# Patient Record
Sex: MALE | Race: OTHER | HISPANIC OR LATINO | URBAN - METROPOLITAN AREA
[De-identification: names, ages, dates, MRNs, and addresses within clinical notes are randomized per-mention and may not be internally consistent; named-entity substitution may affect disease eponyms.]

---

## 2017-11-08 ENCOUNTER — EMERGENCY (EMERGENCY)
Facility: HOSPITAL | Age: 62
LOS: 1 days | Discharge: HOME | End: 2017-11-08
Attending: EMERGENCY MEDICINE
Payer: COMMERCIAL

## 2017-11-08 VITALS
TEMPERATURE: 98 F | DIASTOLIC BLOOD PRESSURE: 74 MMHG | OXYGEN SATURATION: 98 % | RESPIRATION RATE: 18 BRPM | HEART RATE: 77 BPM | SYSTOLIC BLOOD PRESSURE: 145 MMHG

## 2017-11-08 VITALS
HEART RATE: 74 BPM | SYSTOLIC BLOOD PRESSURE: 134 MMHG | HEIGHT: 65 IN | OXYGEN SATURATION: 98 % | RESPIRATION RATE: 18 BRPM | TEMPERATURE: 98 F | DIASTOLIC BLOOD PRESSURE: 74 MMHG | WEIGHT: 214.95 LBS

## 2017-11-08 DIAGNOSIS — I82.432 ACUTE EMBOLISM AND THROMBOSIS OF LEFT POPLITEAL VEIN: ICD-10-CM

## 2017-11-08 DIAGNOSIS — M79.662 PAIN IN LEFT LOWER LEG: ICD-10-CM

## 2017-11-08 DIAGNOSIS — I10 ESSENTIAL (PRIMARY) HYPERTENSION: ICD-10-CM

## 2017-11-08 LAB
ALBUMIN SERPL ELPH-MCNC: 4.1 G/DL — SIGNIFICANT CHANGE UP (ref 3.5–5)
ALP SERPL-CCNC: 78 U/L — SIGNIFICANT CHANGE UP (ref 40–120)
ALT FLD-CCNC: 27 U/L DA — SIGNIFICANT CHANGE UP (ref 10–60)
ANION GAP SERPL CALC-SCNC: 5 MMOL/L — SIGNIFICANT CHANGE UP (ref 5–17)
AST SERPL-CCNC: 14 U/L — SIGNIFICANT CHANGE UP (ref 10–40)
BILIRUB SERPL-MCNC: 0.7 MG/DL — SIGNIFICANT CHANGE UP (ref 0.2–1.2)
BUN SERPL-MCNC: 15 MG/DL — SIGNIFICANT CHANGE UP (ref 7–18)
CALCIUM SERPL-MCNC: 8.8 MG/DL — SIGNIFICANT CHANGE UP (ref 8.4–10.5)
CHLORIDE SERPL-SCNC: 105 MMOL/L — SIGNIFICANT CHANGE UP (ref 96–108)
CO2 SERPL-SCNC: 28 MMOL/L — SIGNIFICANT CHANGE UP (ref 22–31)
CREAT SERPL-MCNC: 0.99 MG/DL — SIGNIFICANT CHANGE UP (ref 0.5–1.3)
GLUCOSE SERPL-MCNC: 99 MG/DL — SIGNIFICANT CHANGE UP (ref 70–99)
HCT VFR BLD CALC: 52.2 % — HIGH (ref 39–50)
HGB BLD-MCNC: 17.4 G/DL — HIGH (ref 13–17)
MCHC RBC-ENTMCNC: 30.1 PG — SIGNIFICANT CHANGE UP (ref 27–34)
MCHC RBC-ENTMCNC: 33.4 GM/DL — SIGNIFICANT CHANGE UP (ref 32–36)
MCV RBC AUTO: 90.1 FL — SIGNIFICANT CHANGE UP (ref 80–100)
PLATELET # BLD AUTO: 169 K/UL — SIGNIFICANT CHANGE UP (ref 150–400)
POTASSIUM SERPL-MCNC: 4.3 MMOL/L — SIGNIFICANT CHANGE UP (ref 3.5–5.3)
POTASSIUM SERPL-SCNC: 4.3 MMOL/L — SIGNIFICANT CHANGE UP (ref 3.5–5.3)
PROT SERPL-MCNC: 7.7 G/DL — SIGNIFICANT CHANGE UP (ref 6–8.3)
RBC # BLD: 5.79 M/UL — SIGNIFICANT CHANGE UP (ref 4.2–5.8)
RBC # FLD: 12.3 % — SIGNIFICANT CHANGE UP (ref 10.3–14.5)
SODIUM SERPL-SCNC: 138 MMOL/L — SIGNIFICANT CHANGE UP (ref 135–145)
WBC # BLD: 8.7 K/UL — SIGNIFICANT CHANGE UP (ref 3.8–10.5)
WBC # FLD AUTO: 8.7 K/UL — SIGNIFICANT CHANGE UP (ref 3.8–10.5)

## 2017-11-08 PROCEDURE — 85027 COMPLETE CBC AUTOMATED: CPT

## 2017-11-08 PROCEDURE — 99284 EMERGENCY DEPT VISIT MOD MDM: CPT

## 2017-11-08 PROCEDURE — 99284 EMERGENCY DEPT VISIT MOD MDM: CPT | Mod: 25

## 2017-11-08 PROCEDURE — 93971 EXTREMITY STUDY: CPT | Mod: 26,LT

## 2017-11-08 PROCEDURE — 93971 EXTREMITY STUDY: CPT

## 2017-11-08 PROCEDURE — 96374 THER/PROPH/DIAG INJ IV PUSH: CPT

## 2017-11-08 PROCEDURE — 80053 COMPREHEN METABOLIC PANEL: CPT

## 2017-11-08 RX ORDER — APIXABAN 2.5 MG/1
10 TABLET, FILM COATED ORAL ONCE
Refills: 0 | Status: DISCONTINUED | OUTPATIENT
Start: 2017-11-08 | End: 2017-11-12

## 2017-11-08 RX ORDER — IBUPROFEN 200 MG
1 TABLET ORAL
Qty: 28 | Refills: 0
Start: 2017-11-08 | End: 2017-11-15

## 2017-11-08 RX ORDER — IBUPROFEN 200 MG
600 TABLET ORAL ONCE
Refills: 0 | Status: COMPLETED | OUTPATIENT
Start: 2017-11-08 | End: 2017-11-08

## 2017-11-08 RX ORDER — KETOROLAC TROMETHAMINE 30 MG/ML
30 SYRINGE (ML) INJECTION ONCE
Refills: 0 | Status: DISCONTINUED | OUTPATIENT
Start: 2017-11-08 | End: 2017-11-08

## 2017-11-08 RX ORDER — APIXABAN 2.5 MG/1
1 TABLET, FILM COATED ORAL
Qty: 74 | Refills: 0
Start: 2017-11-08 | End: 2017-12-08

## 2017-11-08 RX ADMIN — Medication 30 MILLIGRAM(S): at 12:40

## 2017-11-08 RX ADMIN — Medication 600 MILLIGRAM(S): at 13:24

## 2017-11-08 RX ADMIN — Medication 600 MILLIGRAM(S): at 12:40

## 2017-11-08 RX ADMIN — Medication 30 MILLIGRAM(S): at 13:24

## 2017-11-08 NOTE — ED PROVIDER NOTE - OBJECTIVE STATEMENT
63 y/o M pt w/ PMHx of HTN presents to ED c/o L calf pain x2 days. Pt reports travel to [place] in September. Pt reports no family history of DVT. Pt denies fever, chills, or any other complaints. PMD is Dr. Israel Redman. Pt's pharmacy is [facility] ([address] [id number]) on [facility]. MADINADA. 63 y/o M pt w/ PMHx of HTN presents to ED c/o L calf pain x2 days. Pt reports travel to [facility] in September. Pt reports no family history of DVT. Pt denies fever, chills, or any other complaints. PMD is Dr. Israel Mcgrath. Pt's pharmacy is [facility] ([address] [id number]) on [address] [facility].

## 2017-11-08 NOTE — ED PROVIDER NOTE - MEDICAL DECISION MAKING DETAILS
started on eliquis. pain improved.   Discussed anticipatory guidance and return precautions. Discussed results and gave copy to pt.  Jalil with outpt follow up.

## 2017-11-08 NOTE — ED ADULT NURSE NOTE - OBJECTIVE STATEMENT
pt is here for leg pain.  c/o pain 10/10.  pt states I have" left calf pain x 2 days".  Denied chest pain or sob, pt calm at the bedside with family member.

## 2017-11-10 ENCOUNTER — EMERGENCY (EMERGENCY)
Facility: HOSPITAL | Age: 62
LOS: 1 days | Discharge: HOME | End: 2017-11-10
Attending: EMERGENCY MEDICINE | Admitting: EMERGENCY MEDICINE
Payer: COMMERCIAL

## 2017-11-10 VITALS
RESPIRATION RATE: 17 BRPM | OXYGEN SATURATION: 97 % | DIASTOLIC BLOOD PRESSURE: 83 MMHG | HEART RATE: 75 BPM | SYSTOLIC BLOOD PRESSURE: 122 MMHG

## 2017-11-10 VITALS
WEIGHT: 214.95 LBS | HEART RATE: 81 BPM | TEMPERATURE: 98 F | RESPIRATION RATE: 18 BRPM | DIASTOLIC BLOOD PRESSURE: 74 MMHG | SYSTOLIC BLOOD PRESSURE: 137 MMHG | OXYGEN SATURATION: 96 %

## 2017-11-10 PROCEDURE — 99283 EMERGENCY DEPT VISIT LOW MDM: CPT

## 2017-11-10 RX ORDER — OXYCODONE HYDROCHLORIDE 5 MG/1
1 TABLET ORAL
Qty: 25 | Refills: 0 | OUTPATIENT
Start: 2017-11-10 | End: 2017-11-15

## 2017-11-10 RX ORDER — OXYCODONE HYDROCHLORIDE 5 MG/1
5 TABLET ORAL ONCE
Qty: 0 | Refills: 0 | Status: DISCONTINUED | OUTPATIENT
Start: 2017-11-10 | End: 2017-11-10

## 2017-11-10 RX ADMIN — OXYCODONE HYDROCHLORIDE 5 MILLIGRAM(S): 5 TABLET ORAL at 10:24

## 2017-11-10 NOTE — ED ADULT NURSE NOTE - OBJECTIVE STATEMENT
Recvd pt awake, alert and responsive to all stimuli.  no sob or respiratory distress noted at this time.  pt c/o significant LLE pain after being dx with left leg dvt.  as per pt, he took a drive to [place] and began to have pain to his LLE on monday.  pt went and was assessed by md on tues being dx with dvt.  pt given eliquis and ibuprofen but still is uncomfortable.  pt denies any cp or sob at this time.  pt currently being assessed by md.  will continue to monitor. Recvd pt awake, alert and responsive to all stimuli.  no sob or respiratory distress noted at this time.  pt c/o significant LLE pain after being dx with left leg dvt.  as per pt, he took a drive to [place] and began to have pain to his LLE on monday.  pt went and was assessed by md on tues being dx with dvt.  pt given eliquis and ibuprofen but still is uncomfortable.  pt denies any cp or sob at this time.  affected extremity is warm to touch and normal in color.  pulses are full and regular in rhythm upon palpation.  capillary refill to affected toes are < 2 seconds.  pt currently being assessed by md.  will continue to monitor.

## 2017-11-10 NOTE — ED PROVIDER NOTE - PLAN OF CARE
You were seen and evaluated in the emergency department for calf pain. It is consistent with pain from your DVT. The blood thinners do not resolve pain immediately and it will take time. In the mean time take You may take up to 400mg of ibuprofen (Motrin, Advil) every 8 hours as needed for pain. Please take ibuprofen with food if possible to prevent stomach upset. You may also take up to 1000mg of acetaminophen (Tylenol) every 8 hours as needed for pain. It is ok to mix these medications with each other. Do not mix them with other pain medications such as naproxen (Alieve) or asprin unless specifically instructed by your doctor. Elevate your leg above the level of your hip when you are not using it. You may apply ice to the affected area for no more than 15 minutes as needed for comfort. You may apply ice every 2-4 times a day as needed.     If these do not relieve the pain we will provide a short course of oxycodone, which is a more potent narcotic than your tramadol. Do not drive, operate heavy machinery, make important decisions, or perform complicated tasks within 4 hours of taking oxycodone. Do not take tramadol within 4 hours of taking oxycodone.     Please return if you develop significant worsening of your leg, chest pain, significant cough, trouble breathing, worsening shortness of breath with activity, you fall with a head injury, have a car accident, have dark black stool or bloody stool, or develop other worsening or concerning new symptoms. Rachele follow up with your regular doctor within the next 2-3 days. You were seen and evaluated in the emergency department for calf pain. It is consistent with pain from your DVT. The blood thinners do not resolve pain immediately and it will take time. In the mean time take You may take up to 220mg of naproxen (Alieve) every 2 times daily as needed for pain. Please take ibuprofen with food if possible to prevent stomach upset. You may also take up to 1000mg of acetaminophen (Tylenol) every 8 hours as needed for pain. It is ok to mix these medications with each other. Do not mix them with other pain medications such as naproxen (Alieve) or asprin unless specifically instructed by your doctor. Elevate your leg above the level of your hip when you are not using it. You may apply ice to the affected area for no more than 15 minutes as needed for comfort. You may apply ice every 2-4 times a day as needed.     If these do not relieve the pain we will provide a short course of oxycodone, which is a more potent narcotic than your tramadol. Do not drive, operate heavy machinery, make important decisions, or perform complicated tasks within 4 hours of taking oxycodone. Do not take tramadol within 4 hours of taking oxycodone.     Please return if you develop significant worsening of your leg, chest pain, significant cough, trouble breathing, worsening shortness of breath with activity, you fall with a head injury, have a car accident, have dark black stool or bloody stool, or develop other worsening or concerning new symptoms. Pleas follow up with your regular doctor within the next 2-3 days.   Seek immediate medical care for any new/worsening signs or symptoms.

## 2017-11-10 NOTE — ED PROVIDER NOTE - PHYSICAL EXAMINATION
ATTENDING MD Tammy:   GEN: well-appearing, mild distress from pain, AOx4  HEAD: NCAT  EYES: clear  ENT: mucus membranes are moist, oropharynx is within normal limits, no JVD  CV: normal rate, regular rhythm, S1, S2, no murmurs, rubs, or gallops  RESP: lungs clear to auscultation bilaterally, equal breath sounds bilaterally, normal rate and effort, no hypoxia  ABD: obese, soft, nontender  MSK: minimal edema of LLE, L-calf tenderness and TTP in popliteal fossa, no thigh tenderness, 2+ DP and PT bilaterally, <2 sec cap refill, no erythema  NEURO: gross motor, sensory and coordination intact

## 2017-11-10 NOTE — ED PROVIDER NOTE - CARE PLAN
Principal Discharge DX:	Acute deep vein thrombosis (DVT) of left lower extremity, unspecified vein  Instructions for follow-up, activity and diet:	You were seen and evaluated in the emergency department for calf pain. It is consistent with pain from your DVT. The blood thinners do not resolve pain immediately and it will take time. In the mean time take You may take up to 400mg of ibuprofen (Motrin, Advil) every 8 hours as needed for pain. Please take ibuprofen with food if possible to prevent stomach upset. You may also take up to 1000mg of acetaminophen (Tylenol) every 8 hours as needed for pain. It is ok to mix these medications with each other. Do not mix them with other pain medications such as naproxen (Alieve) or asprin unless specifically instructed by your doctor. Elevate your leg above the level of your hip when you are not using it. You may apply ice to the affected area for no more than 15 minutes as needed for comfort. You may apply ice every 2-4 times a day as needed.     If these do not relieve the pain we will provide a short course of oxycodone, which is a more potent narcotic than your tramadol. Do not drive, operate heavy machinery, make important decisions, or perform complicated tasks within 4 hours of taking oxycodone. Do not take tramadol within 4 hours of taking oxycodone.     Please return if you develop significant worsening of your leg, chest pain, significant cough, trouble breathing, worsening shortness of breath with activity, you fall with a head injury, have a car accident, have dark black stool or bloody stool, or develop other worsening or concerning new symptoms. Rachlee follow up with your regular doctor within the next 2-3 days. Principal Discharge DX:	Acute deep vein thrombosis (DVT) of left lower extremity, unspecified vein  Instructions for follow-up, activity and diet:	You were seen and evaluated in the emergency department for calf pain. It is consistent with pain from your DVT. The blood thinners do not resolve pain immediately and it will take time. In the mean time take You may take up to 220mg of naproxen (Alieve) every 2 times daily as needed for pain. Please take ibuprofen with food if possible to prevent stomach upset. You may also take up to 1000mg of acetaminophen (Tylenol) every 8 hours as needed for pain. It is ok to mix these medications with each other. Do not mix them with other pain medications such as naproxen (Alieve) or asprin unless specifically instructed by your doctor. Elevate your leg above the level of your hip when you are not using it. You may apply ice to the affected area for no more than 15 minutes as needed for comfort. You may apply ice every 2-4 times a day as needed.     If these do not relieve the pain we will provide a short course of oxycodone, which is a more potent narcotic than your tramadol. Do not drive, operate heavy machinery, make important decisions, or perform complicated tasks within 4 hours of taking oxycodone. Do not take tramadol within 4 hours of taking oxycodone.     Please return if you develop significant worsening of your leg, chest pain, significant cough, trouble breathing, worsening shortness of breath with activity, you fall with a head injury, have a car accident, have dark black stool or bloody stool, or develop other worsening or concerning new symptoms. Rachele follow up with your regular doctor within the next 2-3 days.   Seek immediate medical care for any new/worsening signs or symptoms.

## 2017-11-10 NOTE — ED ADULT NURSE NOTE - PMH
Bilateral otitis media    Borderline hypertension    DVT (deep venous thrombosis)    HLD (hyperlipidemia)    Migraine headache    Obstructive sleep apnea on C PAP

## 2017-11-10 NOTE — ED PROVIDER NOTE - OBJECTIVE STATEMENT
This is a 62 year old male w/hx of sciatica HTN, and HLD complaining of R-calf pain since Monday. Pt just came back from a long road trip to [place]. Seen in his PCP's office on Tuesday, got a DVT US that was positive for DVT (reportedly PT and peroneal vein but no report present) and was started on elliquis which he has been taking as directed . He was taking motrin and tramadol (which he takes for his chronic sciatic pain). He says his symptoms have not worsened but the pain medication he is taking is not sufficient to function as it hurts too much to walk around. He denies chest pain, cough, hemotpysis, palpitations, syncope or presyncope, SOB at rest or with position. He has a chronic DE LEON x1 year non-worsening for which he has seen a cardiologist and had an exercise stress test and other workup that was unremarkable. These symptoms have not recently worsened.  No cancer Hx.    Pt declining interperter requesting his daughter interpret at bedside. They do not feel any concerns with communication and neither do providers

## 2017-11-10 NOTE — ED PROVIDER NOTE - GASTROINTESTINAL NEGATIVE STATEMENT, MLM
no abdominal pain, no bloating, no constipation, no diarrhea, no nausea and no vomiting. No black or bloody stool.

## 2017-11-10 NOTE — ED PROVIDER NOTE - ATTENDING CONTRIBUTION TO CARE
ATTENDING MD: I, Jj Munoz, have seen and evaluated this patient with the advance practice clinician.  I have discussed the history, exam ,and aspects of care with the APC.  All documentation by attending

## 2017-11-10 NOTE — ED PROVIDER NOTE - MEDICAL DECISION MAKING DETAILS
ATTENDING MD Tammy: Pt with calf pain non-worsening with known DVT on anticoagulation. Pain is unchanging but not improving with home rx. advised on usual course of DVT, as well as concerning signs or symptoms for which to return to the ED. No signs of acute progression or signs or symptoms of PE developing. Will adjust pain control and DC w/close PCP f/u.

## 2017-11-18 ENCOUNTER — EMERGENCY (EMERGENCY)
Facility: HOSPITAL | Age: 62
LOS: 1 days | Discharge: HOME | End: 2017-11-18
Attending: EMERGENCY MEDICINE | Admitting: EMERGENCY MEDICINE
Payer: COMMERCIAL

## 2017-11-18 VITALS
SYSTOLIC BLOOD PRESSURE: 127 MMHG | OXYGEN SATURATION: 100 % | RESPIRATION RATE: 20 BRPM | HEART RATE: 60 BPM | DIASTOLIC BLOOD PRESSURE: 74 MMHG

## 2017-11-18 VITALS
RESPIRATION RATE: 18 BRPM | OXYGEN SATURATION: 93 % | HEART RATE: 76 BPM | SYSTOLIC BLOOD PRESSURE: 122 MMHG | HEIGHT: 65 IN | TEMPERATURE: 98 F | WEIGHT: 214.95 LBS | DIASTOLIC BLOOD PRESSURE: 78 MMHG

## 2017-11-18 LAB
ALBUMIN SERPL ELPH-MCNC: 4.4 G/DL — SIGNIFICANT CHANGE UP (ref 3.3–5)
ALP SERPL-CCNC: 73 U/L — SIGNIFICANT CHANGE UP (ref 40–120)
ALT FLD-CCNC: 25 U/L RC — SIGNIFICANT CHANGE UP (ref 10–45)
ANION GAP SERPL CALC-SCNC: 12 MMOL/L — SIGNIFICANT CHANGE UP (ref 5–17)
APTT BLD: 36.5 SEC — SIGNIFICANT CHANGE UP (ref 27.5–37.4)
AST SERPL-CCNC: 16 U/L — SIGNIFICANT CHANGE UP (ref 10–40)
BASOPHILS # BLD AUTO: 0.1 K/UL — SIGNIFICANT CHANGE UP (ref 0–0.2)
BASOPHILS NFR BLD AUTO: 1.1 % — SIGNIFICANT CHANGE UP (ref 0–2)
BILIRUB SERPL-MCNC: 0.4 MG/DL — SIGNIFICANT CHANGE UP (ref 0.2–1.2)
BUN SERPL-MCNC: 14 MG/DL — SIGNIFICANT CHANGE UP (ref 7–23)
CALCIUM SERPL-MCNC: 9.3 MG/DL — SIGNIFICANT CHANGE UP (ref 8.4–10.5)
CHLORIDE SERPL-SCNC: 103 MMOL/L — SIGNIFICANT CHANGE UP (ref 96–108)
CO2 SERPL-SCNC: 23 MMOL/L — SIGNIFICANT CHANGE UP (ref 22–31)
CREAT SERPL-MCNC: 0.93 MG/DL — SIGNIFICANT CHANGE UP (ref 0.5–1.3)
EOSINOPHIL # BLD AUTO: 0.1 K/UL — SIGNIFICANT CHANGE UP (ref 0–0.5)
EOSINOPHIL NFR BLD AUTO: 1.5 % — SIGNIFICANT CHANGE UP (ref 0–6)
GLUCOSE SERPL-MCNC: 92 MG/DL — SIGNIFICANT CHANGE UP (ref 70–99)
HCT VFR BLD CALC: 49.6 % — SIGNIFICANT CHANGE UP (ref 39–50)
HGB BLD-MCNC: 16.2 G/DL — SIGNIFICANT CHANGE UP (ref 13–17)
INR BLD: 1.17 RATIO — HIGH (ref 0.88–1.16)
LYMPHOCYTES # BLD AUTO: 1.7 K/UL — SIGNIFICANT CHANGE UP (ref 1–3.3)
LYMPHOCYTES # BLD AUTO: 28.6 % — SIGNIFICANT CHANGE UP (ref 13–44)
MCHC RBC-ENTMCNC: 29.9 PG — SIGNIFICANT CHANGE UP (ref 27–34)
MCHC RBC-ENTMCNC: 32.8 GM/DL — SIGNIFICANT CHANGE UP (ref 32–36)
MCV RBC AUTO: 91.4 FL — SIGNIFICANT CHANGE UP (ref 80–100)
MONOCYTES # BLD AUTO: 0.4 K/UL — SIGNIFICANT CHANGE UP (ref 0–0.9)
MONOCYTES NFR BLD AUTO: 6.3 % — SIGNIFICANT CHANGE UP (ref 2–14)
NEUTROPHILS # BLD AUTO: 3.7 K/UL — SIGNIFICANT CHANGE UP (ref 1.8–7.4)
NEUTROPHILS NFR BLD AUTO: 62.5 % — SIGNIFICANT CHANGE UP (ref 43–77)
PLATELET # BLD AUTO: 171 K/UL — SIGNIFICANT CHANGE UP (ref 150–400)
POTASSIUM SERPL-MCNC: 3.9 MMOL/L — SIGNIFICANT CHANGE UP (ref 3.5–5.3)
POTASSIUM SERPL-SCNC: 3.9 MMOL/L — SIGNIFICANT CHANGE UP (ref 3.5–5.3)
PROT SERPL-MCNC: 7.1 G/DL — SIGNIFICANT CHANGE UP (ref 6–8.3)
PROTHROM AB SERPL-ACNC: 12.8 SEC — HIGH (ref 9.8–12.7)
RBC # BLD: 5.43 M/UL — SIGNIFICANT CHANGE UP (ref 4.2–5.8)
RBC # FLD: 12.8 % — SIGNIFICANT CHANGE UP (ref 10.3–14.5)
SODIUM SERPL-SCNC: 138 MMOL/L — SIGNIFICANT CHANGE UP (ref 135–145)
WBC # BLD: 6 K/UL — SIGNIFICANT CHANGE UP (ref 3.8–10.5)
WBC # FLD AUTO: 6 K/UL — SIGNIFICANT CHANGE UP (ref 3.8–10.5)

## 2017-11-18 PROCEDURE — 74177 CT ABD & PELVIS W/CONTRAST: CPT

## 2017-11-18 PROCEDURE — 72125 CT NECK SPINE W/O DYE: CPT | Mod: 26

## 2017-11-18 PROCEDURE — 73030 X-RAY EXAM OF SHOULDER: CPT

## 2017-11-18 PROCEDURE — 73070 X-RAY EXAM OF ELBOW: CPT

## 2017-11-18 PROCEDURE — 71260 CT THORAX DX C+: CPT

## 2017-11-18 PROCEDURE — 96374 THER/PROPH/DIAG INJ IV PUSH: CPT | Mod: XU

## 2017-11-18 PROCEDURE — 71260 CT THORAX DX C+: CPT | Mod: 26

## 2017-11-18 PROCEDURE — 72125 CT NECK SPINE W/O DYE: CPT

## 2017-11-18 PROCEDURE — 80053 COMPREHEN METABOLIC PANEL: CPT

## 2017-11-18 PROCEDURE — 99285 EMERGENCY DEPT VISIT HI MDM: CPT

## 2017-11-18 PROCEDURE — 70450 CT HEAD/BRAIN W/O DYE: CPT

## 2017-11-18 PROCEDURE — 85610 PROTHROMBIN TIME: CPT

## 2017-11-18 PROCEDURE — 74177 CT ABD & PELVIS W/CONTRAST: CPT | Mod: 26

## 2017-11-18 PROCEDURE — 85730 THROMBOPLASTIN TIME PARTIAL: CPT

## 2017-11-18 PROCEDURE — 99284 EMERGENCY DEPT VISIT MOD MDM: CPT | Mod: 25

## 2017-11-18 PROCEDURE — 70450 CT HEAD/BRAIN W/O DYE: CPT | Mod: 26

## 2017-11-18 PROCEDURE — 73030 X-RAY EXAM OF SHOULDER: CPT | Mod: 26,LT

## 2017-11-18 PROCEDURE — 85027 COMPLETE CBC AUTOMATED: CPT

## 2017-11-18 PROCEDURE — 73070 X-RAY EXAM OF ELBOW: CPT | Mod: 26,LT

## 2017-11-18 RX ORDER — OXYCODONE HYDROCHLORIDE 5 MG/1
5 TABLET ORAL ONCE
Qty: 0 | Refills: 0 | Status: DISCONTINUED | OUTPATIENT
Start: 2017-11-18 | End: 2017-11-18

## 2017-11-18 RX ORDER — ACETAMINOPHEN 500 MG
1000 TABLET ORAL ONCE
Qty: 0 | Refills: 0 | Status: COMPLETED | OUTPATIENT
Start: 2017-11-18 | End: 2017-11-18

## 2017-11-18 RX ADMIN — Medication 400 MILLIGRAM(S): at 15:31

## 2017-11-18 RX ADMIN — OXYCODONE HYDROCHLORIDE 5 MILLIGRAM(S): 5 TABLET ORAL at 20:25

## 2017-11-18 NOTE — ED PROVIDER NOTE - PHYSICAL EXAMINATION
Kennedy: A & O x 3, in pain, HEENT WNL and no facial asymmetry; lungs CTAB, heart with reg rhythm; abdomen soft NTND; extremities with no edema; left shoulder with pain on palpation, pain on elbow and wrist radiating to left shoulder, ranges wrist well and ranges elbow well. normal palpation of spine, hips, legs, and right arm. no abrasions or ecchymosis, headwith no pain, no angelito midline cervical pain, skin with no rashes, neuro exam non focal with no motor or sensory deficits

## 2017-11-18 NOTE — ED PROVIDER NOTE - PLAN OF CARE
Take ibuprofen 600 mg every 8 hours as needed for pain with food and plenty of water for 5 days maximum  Take home rx oxycodone 5 mg as needed for pain not controlled with ibuprofen  Wear sling while awake for comfort.  Rotate shoulder and flex/extend elbow 10 times/hour to avoid stiffness/frozen shoulder  See orthopedics within 1 week  Return to the emergency department for worsening pain, inability to walk, severe swelling, numbness/weakness, confusion/severe headache, or if you have any new or changing symptoms or concerns

## 2017-11-18 NOTE — ED ADULT NURSE NOTE - OBJECTIVE STATEMENT
62 yr old male with dtr and grand dtr presents s/p fall off ladder, was descending on ladder, missed the last 2 steps, fell onto L side, c/o L shoulder, arm pain, L sided lower back pain, +ambulatory since incident, unknown LOC, unwitnessed fall, +on eliquis for LLE DVT, at present neuro exam wnl, limited AROM LUE due to pain, appears comfortable

## 2017-11-18 NOTE — ED PROVIDER NOTE - CARE PLAN
Instructions for follow-up, activity and diet:	Take ibuprofen 600 mg every 8 hours as needed for pain with food and plenty of water for 5 days maximum  Take home rx oxycodone 5 mg as needed for pain not controlled with ibuprofen  Wear sling while awake for comfort.  Rotate shoulder and flex/extend elbow 10 times/hour to avoid stiffness/frozen shoulder  See orthopedics within 1 week  Return to the emergency department for worsening pain, inability to walk, severe swelling, numbness/weakness, confusion/severe headache, or if you have any new or changing symptoms or concerns Principal Discharge DX:	Fall from ladder, initial encounter  Instructions for follow-up, activity and diet:	Take ibuprofen 600 mg every 8 hours as needed for pain with food and plenty of water for 5 days maximum  Take home rx oxycodone 5 mg as needed for pain not controlled with ibuprofen  Wear sling while awake for comfort.  Rotate shoulder and flex/extend elbow 10 times/hour to avoid stiffness/frozen shoulder  See orthopedics within 1 week  Return to the emergency department for worsening pain, inability to walk, severe swelling, numbness/weakness, confusion/severe headache, or if you have any new or changing symptoms or concerns  Secondary Diagnosis:	Arm contusion, left, initial encounter

## 2017-11-18 NOTE — ED PROVIDER NOTE - PROGRESS NOTE DETAILS
Attending MD Leger: bui CT scan negative for acute traumatic injuries. plain films L shoulder and humerus prelim read negative for fracture or dislocation. Patient still with left arm pain, ddx includes AC sprain/strain vs. msk arm pain. Will place in arm sling, dc home with PO analgesia and ortho follow up Kennedy PGY3: patient will take his oxycodone at home and does not need prescription. will have work note and follow up with ortho

## 2017-11-18 NOTE — ED PROVIDER NOTE - ATTENDING CONTRIBUTION TO CARE
86 yo M presents s/p fall from ladder. He is on eloquis. He fell from a height of 3ft he states. He fell onto his left side. He mainly complains of L arm pain, 10/10 with difficulty moving the L shoulder. He reports mild headache. He did hit his head. no LOC.   PE with L shoulder and L elbow TTP. No L wrist TTP. + L chest and L abdominal/flank TTP. neuro intact. no obvious deformity, no echymosis/swelling/crepitus. VS stable. 86 yo M presents s/p fall from ladder. He is on eloquis. He fell from a height of 3ft he states. He fell onto his left side. He mainly complains of L arm pain, 10/10 with difficulty moving the L shoulder. He reports mild headache. He did hit his head. no LOC.   PE with L shoulder and L elbow TTP. No L wrist TTP. + L chest and L abdominal/flank TTP. neuro intact. no obvious deformity, no echymosis/swelling/crepitus. VS stable.  labs, CTs, and XRs ordered.  I signed this patient out to Dr. Leger at this time. All labs, XR and CT results pending at time of signout.

## 2017-11-18 NOTE — ED PROVIDER NOTE - OBJECTIVE STATEMENT
62 year old, s/p fall off of ladder and missed last 2 steps and fall on to left shoulder. Remembers being on the ground for ~5 minutes and patient doesn't remember if he exactly passed out or not. On Eliquis for blood clot on left leg. Pain with left arm, left hand, left back. mild headache without obvious head trauma.     PMD: Israle Mcgrath 62 year old, s/p mechanical fall off of ladder and missed last 2 steps and fall on to left shoulder. Remembers being on the ground for ~5 minutes and patient doesn't remember if he exactly passed out from the pain or not. On Eliquis for blood clot on left leg. Pain with left arm, left hand, left back. mild headache without obvious head trauma.     PMD: Israel Mcgrath 62 year old, s/p mechanical fall off of ladder and missed last 2 steps and fall on to left shoulder. Remembers being on the ground for ~5 minutes and patient doesn't remember if he exactly passed out from the pain or not. On Eliquis for blood clot on left leg. Pain with left arm, left hand, left back. mild headache without obvious head trauma.   PAtietn is Martiniquais speaking, declined  services. wants family to translate  PMD: Israel Mcgrath

## 2017-12-15 NOTE — ED PROVIDER NOTE - PSH
History of umbilical hernia repair    Hx of umbilical hernia repair 2010 Attending Attestation (For Attendings USE Only)...

## 2018-11-23 ENCOUNTER — INPATIENT (INPATIENT)
Facility: HOSPITAL | Age: 63
LOS: 1 days | Discharge: HOME | DRG: 728 | End: 2018-11-25
Attending: UROLOGY | Admitting: UROLOGY
Payer: COMMERCIAL

## 2018-11-23 VITALS
TEMPERATURE: 99 F | HEART RATE: 105 BPM | WEIGHT: 214.95 LBS | DIASTOLIC BLOOD PRESSURE: 82 MMHG | HEIGHT: 63 IN | OXYGEN SATURATION: 95 % | SYSTOLIC BLOOD PRESSURE: 134 MMHG | RESPIRATION RATE: 17 BRPM

## 2018-11-23 DIAGNOSIS — N41.0 ACUTE PROSTATITIS: ICD-10-CM

## 2018-11-23 LAB
ALBUMIN SERPL ELPH-MCNC: 3.9 G/DL — SIGNIFICANT CHANGE UP (ref 3.3–5)
ALP SERPL-CCNC: 76 U/L — SIGNIFICANT CHANGE UP (ref 40–120)
ALT FLD-CCNC: 30 U/L — SIGNIFICANT CHANGE UP (ref 10–45)
ANION GAP SERPL CALC-SCNC: 17 MMOL/L — SIGNIFICANT CHANGE UP (ref 5–17)
APPEARANCE UR: CLEAR — SIGNIFICANT CHANGE UP
AST SERPL-CCNC: 28 U/L — SIGNIFICANT CHANGE UP (ref 10–40)
BACTERIA # UR AUTO: ABNORMAL
BASOPHILS # BLD AUTO: 0 K/UL — SIGNIFICANT CHANGE UP (ref 0–0.2)
BASOPHILS NFR BLD AUTO: 0.1 % — SIGNIFICANT CHANGE UP (ref 0–2)
BILIRUB SERPL-MCNC: 1.5 MG/DL — HIGH (ref 0.2–1.2)
BILIRUB UR-MCNC: NEGATIVE — SIGNIFICANT CHANGE UP
BUN SERPL-MCNC: 14 MG/DL — SIGNIFICANT CHANGE UP (ref 7–23)
CALCIUM SERPL-MCNC: 8.7 MG/DL — SIGNIFICANT CHANGE UP (ref 8.4–10.5)
CHLORIDE SERPL-SCNC: 99 MMOL/L — SIGNIFICANT CHANGE UP (ref 96–108)
CO2 SERPL-SCNC: 20 MMOL/L — LOW (ref 22–31)
COLOR SPEC: SIGNIFICANT CHANGE UP
CREAT SERPL-MCNC: 1.01 MG/DL — SIGNIFICANT CHANGE UP (ref 0.5–1.3)
DIFF PNL FLD: ABNORMAL
EOSINOPHIL # BLD AUTO: 0 K/UL — SIGNIFICANT CHANGE UP (ref 0–0.5)
EOSINOPHIL NFR BLD AUTO: 0.3 % — SIGNIFICANT CHANGE UP (ref 0–6)
EPI CELLS # UR: 0 /HPF — SIGNIFICANT CHANGE UP
GAS PNL BLDV: SIGNIFICANT CHANGE UP
GLUCOSE SERPL-MCNC: 110 MG/DL — HIGH (ref 70–99)
GLUCOSE UR QL: NEGATIVE — SIGNIFICANT CHANGE UP
HCT VFR BLD CALC: 48.4 % — SIGNIFICANT CHANGE UP (ref 39–50)
HGB BLD-MCNC: 16.3 G/DL — SIGNIFICANT CHANGE UP (ref 13–17)
HYALINE CASTS # UR AUTO: 0 /LPF — SIGNIFICANT CHANGE UP (ref 0–2)
KETONES UR-MCNC: SIGNIFICANT CHANGE UP
LEUKOCYTE ESTERASE UR-ACNC: ABNORMAL
LYMPHOCYTES # BLD AUTO: 0.3 K/UL — LOW (ref 1–3.3)
LYMPHOCYTES # BLD AUTO: 5.7 % — LOW (ref 13–44)
MCHC RBC-ENTMCNC: 29.3 PG — SIGNIFICANT CHANGE UP (ref 27–34)
MCHC RBC-ENTMCNC: 33.7 GM/DL — SIGNIFICANT CHANGE UP (ref 32–36)
MCV RBC AUTO: 87.1 FL — SIGNIFICANT CHANGE UP (ref 80–100)
MONOCYTES # BLD AUTO: 0.3 K/UL — SIGNIFICANT CHANGE UP (ref 0–0.9)
MONOCYTES NFR BLD AUTO: 5.6 % — SIGNIFICANT CHANGE UP (ref 2–14)
NEUTROPHILS # BLD AUTO: 5.3 K/UL — SIGNIFICANT CHANGE UP (ref 1.8–7.4)
NEUTROPHILS NFR BLD AUTO: 88.3 % — HIGH (ref 43–77)
NITRITE UR-MCNC: POSITIVE
PH UR: 6 — SIGNIFICANT CHANGE UP (ref 5–8)
PLATELET # BLD AUTO: 107 K/UL — LOW (ref 150–400)
POTASSIUM SERPL-MCNC: 4.2 MMOL/L — SIGNIFICANT CHANGE UP (ref 3.5–5.3)
POTASSIUM SERPL-SCNC: 4.2 MMOL/L — SIGNIFICANT CHANGE UP (ref 3.5–5.3)
PROT SERPL-MCNC: 7 G/DL — SIGNIFICANT CHANGE UP (ref 6–8.3)
PROT UR-MCNC: ABNORMAL
RBC # BLD: 5.56 M/UL — SIGNIFICANT CHANGE UP (ref 4.2–5.8)
RBC # FLD: 13.6 % — SIGNIFICANT CHANGE UP (ref 10.3–14.5)
RBC CASTS # UR COMP ASSIST: 5 /HPF — HIGH (ref 0–4)
SODIUM SERPL-SCNC: 136 MMOL/L — SIGNIFICANT CHANGE UP (ref 135–145)
SP GR SPEC: 1.02 — SIGNIFICANT CHANGE UP (ref 1.01–1.02)
UROBILINOGEN FLD QL: NEGATIVE — SIGNIFICANT CHANGE UP
WBC # BLD: 6 K/UL — SIGNIFICANT CHANGE UP (ref 3.8–10.5)
WBC # FLD AUTO: 6 K/UL — SIGNIFICANT CHANGE UP (ref 3.8–10.5)
WBC UR QL: 75 /HPF — HIGH (ref 0–5)

## 2018-11-23 PROCEDURE — 70450 CT HEAD/BRAIN W/O DYE: CPT | Mod: 26

## 2018-11-23 PROCEDURE — 99222 1ST HOSP IP/OBS MODERATE 55: CPT

## 2018-11-23 PROCEDURE — 99285 EMERGENCY DEPT VISIT HI MDM: CPT

## 2018-11-23 RX ORDER — ACETAMINOPHEN 500 MG
975 TABLET ORAL ONCE
Qty: 0 | Refills: 0 | Status: COMPLETED | OUTPATIENT
Start: 2018-11-23 | End: 2018-11-23

## 2018-11-23 RX ORDER — DOCUSATE SODIUM 100 MG
100 CAPSULE ORAL THREE TIMES A DAY
Qty: 0 | Refills: 0 | Status: DISCONTINUED | OUTPATIENT
Start: 2018-11-23 | End: 2018-11-25

## 2018-11-23 RX ORDER — AMIKACIN SULFATE 250 MG/ML
490 INJECTION, SOLUTION INTRAMUSCULAR; INTRAVENOUS ONCE
Qty: 0 | Refills: 0 | Status: COMPLETED | OUTPATIENT
Start: 2018-11-23 | End: 2018-11-23

## 2018-11-23 RX ORDER — SODIUM CHLORIDE 9 MG/ML
1000 INJECTION INTRAMUSCULAR; INTRAVENOUS; SUBCUTANEOUS ONCE
Qty: 0 | Refills: 0 | Status: COMPLETED | OUTPATIENT
Start: 2018-11-23 | End: 2018-11-23

## 2018-11-23 RX ORDER — APIXABAN 2.5 MG/1
0 TABLET, FILM COATED ORAL
Qty: 0 | Refills: 0 | COMMUNITY

## 2018-11-23 RX ORDER — ATORVASTATIN CALCIUM 80 MG/1
80 TABLET, FILM COATED ORAL AT BEDTIME
Qty: 0 | Refills: 0 | Status: DISCONTINUED | OUTPATIENT
Start: 2018-11-23 | End: 2018-11-25

## 2018-11-23 RX ORDER — ONDANSETRON 8 MG/1
4 TABLET, FILM COATED ORAL EVERY 6 HOURS
Qty: 0 | Refills: 0 | Status: DISCONTINUED | OUTPATIENT
Start: 2018-11-23 | End: 2018-11-25

## 2018-11-23 RX ORDER — AMIKACIN SULFATE 250 MG/ML
INJECTION, SOLUTION INTRAMUSCULAR; INTRAVENOUS
Qty: 0 | Refills: 0 | Status: DISCONTINUED | OUTPATIENT
Start: 2018-11-23 | End: 2018-11-25

## 2018-11-23 RX ORDER — FENOFIBRATE,MICRONIZED 130 MG
0 CAPSULE ORAL
Qty: 0 | Refills: 0 | COMMUNITY

## 2018-11-23 RX ORDER — MORPHINE SULFATE 50 MG/1
4 CAPSULE, EXTENDED RELEASE ORAL ONCE
Qty: 0 | Refills: 0 | Status: DISCONTINUED | OUTPATIENT
Start: 2018-11-23 | End: 2018-11-23

## 2018-11-23 RX ORDER — SENNA PLUS 8.6 MG/1
2 TABLET ORAL AT BEDTIME
Qty: 0 | Refills: 0 | Status: DISCONTINUED | OUTPATIENT
Start: 2018-11-23 | End: 2018-11-25

## 2018-11-23 RX ORDER — PHENAZOPYRIDINE HCL 100 MG
100 TABLET ORAL ONCE
Qty: 0 | Refills: 0 | Status: COMPLETED | OUTPATIENT
Start: 2018-11-23 | End: 2018-11-23

## 2018-11-23 RX ORDER — OXYCODONE AND ACETAMINOPHEN 5; 325 MG/1; MG/1
1 TABLET ORAL EVERY 4 HOURS
Qty: 0 | Refills: 0 | Status: DISCONTINUED | OUTPATIENT
Start: 2018-11-23 | End: 2018-11-25

## 2018-11-23 RX ORDER — GABAPENTIN 400 MG/1
0 CAPSULE ORAL
Qty: 0 | Refills: 0 | COMMUNITY

## 2018-11-23 RX ORDER — INFLUENZA VIRUS VACCINE 15; 15; 15; 15 UG/.5ML; UG/.5ML; UG/.5ML; UG/.5ML
0.5 SUSPENSION INTRAMUSCULAR ONCE
Qty: 0 | Refills: 0 | Status: DISCONTINUED | OUTPATIENT
Start: 2018-11-23 | End: 2018-11-25

## 2018-11-23 RX ORDER — METOCLOPRAMIDE HCL 10 MG
10 TABLET ORAL ONCE
Qty: 0 | Refills: 0 | Status: COMPLETED | OUTPATIENT
Start: 2018-11-23 | End: 2018-11-23

## 2018-11-23 RX ORDER — LANOLIN ALCOHOL/MO/W.PET/CERES
3 CREAM (GRAM) TOPICAL AT BEDTIME
Qty: 0 | Refills: 0 | Status: DISCONTINUED | OUTPATIENT
Start: 2018-11-23 | End: 2018-11-25

## 2018-11-23 RX ORDER — AMIKACIN SULFATE 250 MG/ML
490 INJECTION, SOLUTION INTRAMUSCULAR; INTRAVENOUS EVERY 8 HOURS
Qty: 0 | Refills: 0 | Status: DISCONTINUED | OUTPATIENT
Start: 2018-11-23 | End: 2018-11-25

## 2018-11-23 RX ORDER — CEFTRIAXONE 500 MG/1
1 INJECTION, POWDER, FOR SOLUTION INTRAMUSCULAR; INTRAVENOUS ONCE
Qty: 0 | Refills: 0 | Status: COMPLETED | OUTPATIENT
Start: 2018-11-23 | End: 2018-11-23

## 2018-11-23 RX ORDER — IBUPROFEN 200 MG
0 TABLET ORAL
Qty: 0 | Refills: 0 | COMMUNITY

## 2018-11-23 RX ORDER — AMLODIPINE BESYLATE 2.5 MG/1
10 TABLET ORAL DAILY
Qty: 0 | Refills: 0 | Status: DISCONTINUED | OUTPATIENT
Start: 2018-11-25 | End: 2018-11-25

## 2018-11-23 RX ORDER — PHENAZOPYRIDINE HCL 100 MG
100 TABLET ORAL EVERY 8 HOURS
Qty: 0 | Refills: 0 | Status: DISCONTINUED | OUTPATIENT
Start: 2018-11-23 | End: 2018-11-25

## 2018-11-23 RX ORDER — TRAMADOL HYDROCHLORIDE 50 MG/1
0 TABLET ORAL
Qty: 0 | Refills: 0 | COMMUNITY

## 2018-11-23 RX ORDER — CEFTRIAXONE 500 MG/1
1 INJECTION, POWDER, FOR SOLUTION INTRAMUSCULAR; INTRAVENOUS EVERY 24 HOURS
Qty: 0 | Refills: 0 | Status: DISCONTINUED | OUTPATIENT
Start: 2018-11-23 | End: 2018-11-25

## 2018-11-23 RX ORDER — AMLODIPINE BESYLATE 2.5 MG/1
0 TABLET ORAL
Qty: 0 | Refills: 0 | COMMUNITY

## 2018-11-23 RX ORDER — SODIUM CHLORIDE 9 MG/ML
1000 INJECTION INTRAMUSCULAR; INTRAVENOUS; SUBCUTANEOUS
Qty: 0 | Refills: 0 | Status: DISCONTINUED | OUTPATIENT
Start: 2018-11-23 | End: 2018-11-25

## 2018-11-23 RX ORDER — TAMSULOSIN HYDROCHLORIDE 0.4 MG/1
0.4 CAPSULE ORAL AT BEDTIME
Qty: 0 | Refills: 0 | Status: DISCONTINUED | OUTPATIENT
Start: 2018-11-23 | End: 2018-11-25

## 2018-11-23 RX ADMIN — Medication 3 MILLIGRAM(S): at 22:42

## 2018-11-23 RX ADMIN — TAMSULOSIN HYDROCHLORIDE 0.4 MILLIGRAM(S): 0.4 CAPSULE ORAL at 22:38

## 2018-11-23 RX ADMIN — Medication 975 MILLIGRAM(S): at 16:17

## 2018-11-23 RX ADMIN — Medication 100 MILLIGRAM(S): at 22:38

## 2018-11-23 RX ADMIN — SODIUM CHLORIDE 1000 MILLILITER(S): 9 INJECTION INTRAMUSCULAR; INTRAVENOUS; SUBCUTANEOUS at 17:05

## 2018-11-23 RX ADMIN — CEFTRIAXONE 100 GRAM(S): 500 INJECTION, POWDER, FOR SOLUTION INTRAMUSCULAR; INTRAVENOUS at 17:05

## 2018-11-23 RX ADMIN — AMIKACIN SULFATE 101.96 MILLIGRAM(S): 250 INJECTION, SOLUTION INTRAMUSCULAR; INTRAVENOUS at 18:00

## 2018-11-23 RX ADMIN — Medication 100 MILLIGRAM(S): at 18:00

## 2018-11-23 RX ADMIN — ONDANSETRON 4 MILLIGRAM(S): 8 TABLET, FILM COATED ORAL at 22:38

## 2018-11-23 RX ADMIN — ATORVASTATIN CALCIUM 80 MILLIGRAM(S): 80 TABLET, FILM COATED ORAL at 22:38

## 2018-11-23 RX ADMIN — AMIKACIN SULFATE 101.96 MILLIGRAM(S): 250 INJECTION, SOLUTION INTRAMUSCULAR; INTRAVENOUS at 22:38

## 2018-11-23 RX ADMIN — SENNA PLUS 2 TABLET(S): 8.6 TABLET ORAL at 22:38

## 2018-11-23 RX ADMIN — Medication 10 MILLIGRAM(S): at 17:05

## 2018-11-23 RX ADMIN — MORPHINE SULFATE 4 MILLIGRAM(S): 50 CAPSULE, EXTENDED RELEASE ORAL at 17:05

## 2018-11-23 NOTE — ED PROVIDER NOTE - ATTENDING CONTRIBUTION TO CARE
Attending MD Ordñoez:   I personally have seen and examined this patient.  Physician assistant note reviewed and agree on plan of care and except where noted.  See below for details.     Seen in [facility], Interviewed in Chilean, daughter bedside    63M with PMH including HLD, HAMIAD on CPAP, DVT, borderline HTN, migraine presents to the ED with prostate biopsy 11/21/18 following abnormal MRI.  Reports after biopsy had fevers, chills, dysuria and penile pain at the end of urination.  Report went to urgent care two days ago, had CT, started on Cipro, reports has taken without resolution or improvement of symptoms.  Reports increased back pain since biopsy, worse last night when pain woke him from sleep.  Denies recent trauma or fall. Attending MD Ordoñez:   I personally have seen and examined this patient.  Physician assistant note reviewed and agree on plan of care and except where noted.  See below for details.     Seen in [facility], Interviewed in Faroese, daughter bedside    63M with PMH including HLD, HAMIDA on CPAP, DVT, borderline HTN, migraine presents to the ED with prostate biopsy 11/21/18 following abnormal MRI.  Reports after biopsy had fevers, chills, dysuria and penile pain at the end of urination.  Report went to urgent care two days ago, had CT, started on Cipro, reports has taken without resolution or improvement of symptoms.  Reports increased back pain since biopsy, worse last night when pain woke him from sleep.  Denies recent trauma or fall.  Denies chest pain, shortness of breath, palpitations. Denies vomiting, diarrhea, blood in stools.  Denies hematuria, purulence from penis.  On exam, NAD, AAOx3, head NCAT, PERRL, FROM at neck, no tenderness to midline palpation, no stepoffs along length of spine, lungs CTAB with good inspiratory effort, +S1S2, no m/r/g, abdomen soft with +BS, NT, ND, no CVAT, diffuse tenderness to palpation on attempted rectal, no gross blood noted, moving all extremities with 5/5 strength bilateral upper and lower extremities, good and equal  strength bilaterally ;A/P 63M with fevers, chills, dysuria, prostate pain after biopsy, Ddx includes biopsy related infection, sepsis, prostatitis, will give abx, IVFs, labs, will obtain CT head for HA, will need to admit

## 2018-11-23 NOTE — ED ADULT NURSE NOTE - OBJECTIVE STATEMENT
63 year old male A&OX3 PMHX of DM, s/p Prostate biopsy Wednesday, presents with fevers, chills, headache, back pain, and leg pain that started two days ago. Patient states that he has been in contact with his PMD who instructed patient to come to the emergency room for IV antibiotics. Patient states that he has been using Tylenol for fever and pain with the last dose at 0800. Patient's abdomen is soft and non tender to palpation, Lung sounds are clear and equal bilaterally. Patient denies nausea, vomiting, chest pain, shortness of breath, burning or difficulty urinating, discharge.

## 2018-11-23 NOTE — H&P ADULT - ASSESSMENT
64 yo male with sepsis after transrectal prostate biopsy     - start ceftriaxone and amikacin  - bladder scan with 30cc residual  - start flomax  - start pyridium  - ucx and bcx sent and rec   - ivf hydration  - trend lactate  - home home meds but will hold norvasc for 24 hrs   no need for ct a/p

## 2018-11-23 NOTE — ED PROVIDER NOTE - OBJECTIVE STATEMENT
64 y/o M of HLD, HAMIDA on CPAP, DVT, borderline HTN, and migraine HA presents c/o fever. Pt notes that he was seen in ED, CT scan, started on Ciprofloxacin. 62 y/o M English and Yoruba speaking with daughter at bedside (pt prefers daughter to translate if he has trouble understanding) PMHx of HLD, HAMIDA on CPAP, DVT, borderline HTN, and migraine HA presents c/o fever. Pt notes he had a prostate BX 11/21 for abnormality seen on MRI, the following day he began experiencing fevers (102'F Tmax oral) and chills with dysuria and penile pain at the end of urination. Pt went to  2 days ago CT performed, and was given Cipro which he has taken w/o improvement of sxs. Pt notes increased back pain since onset of sxs (has baseline back pain and LLE numbness due to disc herniation). Last night pt was not feeling well and reports waking up in the middle of the night with severe back pain stating that he couldn't feel his legs (since resolved). Pt also notes a 10/10 pounding diffuse HA w/o radiation no palliative or provoking factors since yesterday, pt denies known h/o migraines and states he never experiences them. Pt denies change in vision, nuchal rigidity, current change in baseline numbness, paresthesias, weakness, difficulty speaking/swallowing/walking, n/v/d, CP, SOB, hematuria, flank pain.

## 2018-11-23 NOTE — ED ADULT TRIAGE NOTE - CHIEF COMPLAINT QUOTE
Prostate biopsy on wed, Thursday pt c/o fevers and chills was seen in the hospital and sent home on Cipro. Today pt c/o headache, fevers and worsening weakness

## 2018-11-23 NOTE — H&P ADULT - NSHPLABSRESULTS_GEN_ALL_CORE
16.3   6.0   )-----------( 107      ( 23 Nov 2018 16:57 )             48.4       bmp pending     Blood Gas Venous - Lactate (11.23.18 @ 16:57)    Blood Gas Venous - Lactate: 3.7 mmoL/L

## 2018-11-23 NOTE — H&P ADULT - HISTORY OF PRESENT ILLNESS
62 yo m with a pmh of elevated psa requiring transrectal prostate cancer two days ago with Dr. Renny Jones. Pt states took oral pre proc abx, and was bowel prepped. Day of bx was given an injection. Yesterday developed fever, fatigue and weakness a/w urinary freq/ urgency and dysuria. [place] [facility] [place]. Was told that labs normal and ct showed inflammation around the prostate.. Fevers cont and therefore came here. Here tmax 103.5.  Denies nausea/ vomiting/ flank pain/ sensation of retention/ bright red blood per rectum

## 2018-11-23 NOTE — ED PROVIDER NOTE - PHYSICAL EXAMINATION
GEN: Pt in NAD, A&O x3.   EYES: Sclera white w/o injection, PERRLA.   ENT: Head NCAT. Nose without deformity, turbinates without edema or erythema, no DC. No auricular TTP. Mouth and pharynx without erythema or exudates, uvula midline, no tonsillar enlargement. Neck supple FROM.   RESP: No chest wall tenderness, CTA b/l, no wheezes, rales, or rhonchi.   CARDIAC: RRR, clear distinct S1, S2, no S3, S4, murmurs, gallops, or rubs.   ABD: Abdomen soft, non-tender. No CVAT b/l.  VASC: 2+ carotid, radial, and dorsalis pedis pulses b/l. No edema or tenderness of the lower extremities.  NEURO: CN2-12 grossly intact. Decreased sensation throughout L upper arm and shoulder pt states is baseline after a previous shoulder sx, decreased sensation L heel and L lateral thigh states is his baseline numbness, otherwise normal and equal sensation. 5/5 strength UE and LE b/l. Pronator drift negative, Romberg negative. Normal gait and gross cerebellar function.   SKIN: No rashes on the trunk.

## 2018-11-23 NOTE — H&P ADULT - NSHPPHYSICALEXAM_GEN_ALL_CORE
awake alert nad axox3 nontoxic appearing   obese soft ntnd   bladder nonpalp   no cvat bl   uncirc phallus  bl desc testis left smaller than right nontender no mass  claudia deferred

## 2018-11-23 NOTE — ED ADULT NURSE NOTE - NSIMPLEMENTINTERV_GEN_ALL_ED
Implemented All Universal Safety Interventions:  [place] to call system. Call bell, personal items and telephone within reach. Instruct patient to call for assistance. Room bathroom lighting operational. Non-slip footwear when patient is off stretcher. Physically safe environment: no spills, clutter or unnecessary equipment. Stretcher in lowest position, wheels locked, appropriate side rails in place.

## 2018-11-23 NOTE — ED PROVIDER NOTE - PROGRESS NOTE DETAILS
Urology evaluated pt at bedside, d/w attending, say to admit to Dr. Renny Lopez. Requesting pyridium and Amikacin. State they do not need repeat CT. -Willy Franklin PA-C

## 2018-11-24 LAB — LACTATE BLDV-MCNC: 1.1 MMOL/L — SIGNIFICANT CHANGE UP (ref 0.7–2)

## 2018-11-24 PROCEDURE — 99231 SBSQ HOSP IP/OBS SF/LOW 25: CPT

## 2018-11-24 RX ORDER — ACETAMINOPHEN 500 MG
1000 TABLET ORAL ONCE
Qty: 0 | Refills: 0 | Status: COMPLETED | OUTPATIENT
Start: 2018-11-24 | End: 2018-11-24

## 2018-11-24 RX ORDER — ZALEPLON 10 MG
5 CAPSULE ORAL AT BEDTIME
Qty: 0 | Refills: 0 | Status: DISCONTINUED | OUTPATIENT
Start: 2018-11-24 | End: 2018-11-25

## 2018-11-24 RX ORDER — ACETAMINOPHEN 500 MG
650 TABLET ORAL ONCE
Qty: 0 | Refills: 0 | Status: COMPLETED | OUTPATIENT
Start: 2018-11-24 | End: 2018-11-24

## 2018-11-24 RX ADMIN — Medication 650 MILLIGRAM(S): at 23:00

## 2018-11-24 RX ADMIN — OXYCODONE AND ACETAMINOPHEN 1 TABLET(S): 5; 325 TABLET ORAL at 14:30

## 2018-11-24 RX ADMIN — Medication 100 MILLIGRAM(S): at 07:02

## 2018-11-24 RX ADMIN — OXYCODONE AND ACETAMINOPHEN 1 TABLET(S): 5; 325 TABLET ORAL at 15:00

## 2018-11-24 RX ADMIN — AMIKACIN SULFATE 101.96 MILLIGRAM(S): 250 INJECTION, SOLUTION INTRAMUSCULAR; INTRAVENOUS at 13:22

## 2018-11-24 RX ADMIN — CEFTRIAXONE 100 GRAM(S): 500 INJECTION, POWDER, FOR SOLUTION INTRAMUSCULAR; INTRAVENOUS at 17:28

## 2018-11-24 RX ADMIN — Medication 100 MILLIGRAM(S): at 21:00

## 2018-11-24 RX ADMIN — Medication 1000 MILLIGRAM(S): at 02:17

## 2018-11-24 RX ADMIN — Medication 400 MILLIGRAM(S): at 02:02

## 2018-11-24 RX ADMIN — Medication 650 MILLIGRAM(S): at 23:30

## 2018-11-24 RX ADMIN — Medication 100 MILLIGRAM(S): at 13:22

## 2018-11-24 RX ADMIN — AMIKACIN SULFATE 101.96 MILLIGRAM(S): 250 INJECTION, SOLUTION INTRAMUSCULAR; INTRAVENOUS at 07:01

## 2018-11-24 RX ADMIN — Medication 5 MILLIGRAM(S): at 21:00

## 2018-11-24 RX ADMIN — TAMSULOSIN HYDROCHLORIDE 0.4 MILLIGRAM(S): 0.4 CAPSULE ORAL at 21:00

## 2018-11-24 RX ADMIN — ATORVASTATIN CALCIUM 80 MILLIGRAM(S): 80 TABLET, FILM COATED ORAL at 21:00

## 2018-11-24 RX ADMIN — AMIKACIN SULFATE 101.96 MILLIGRAM(S): 250 INJECTION, SOLUTION INTRAMUSCULAR; INTRAVENOUS at 21:00

## 2018-11-24 NOTE — PROGRESS NOTE ADULT - SUBJECTIVE AND OBJECTIVE BOX
The patient was seen and examined at bedside.  Denies complaints of chest pain, shortness of breath, nausea, acute pain.    T(C): 37 (11-24-18 @ 06:35), Max: 39.7 (11-23-18 @ 16:12)  HR: 67 (11-24-18 @ 06:35) (63 - 105)  BP: 94/59 (11-24-18 @ 06:35) (94/59 - 134/82)  RR: 18 (11-24-18 @ 06:35) (17 - 18)  SpO2: 96% (11-24-18 @ 06:35) (94% - 96%)  Wt(kg): --    Physical Exam:    General: NAD, A+Ox3  Abdomen: soft, non-tender, non-distended        11-24 @ 07:01  -  11-24 @ 08:58  --------------------------------------------------------  IN: 0 mL / OUT: 300 mL / NET: -300 mL    void - 300cc

## 2018-11-24 NOTE — PROGRESS NOTE ADULT - ASSESSMENT
63 year old male with fevers s/p TRUS biopsy of the prostate    -continue antibiotics  -f/u blood and urine cultures  -OOB/ambulate  -obtain PVR

## 2018-11-25 ENCOUNTER — TRANSCRIPTION ENCOUNTER (OUTPATIENT)
Age: 63
End: 2018-11-25

## 2018-11-25 VITALS
HEART RATE: 58 BPM | TEMPERATURE: 98 F | OXYGEN SATURATION: 96 % | SYSTOLIC BLOOD PRESSURE: 130 MMHG | RESPIRATION RATE: 16 BRPM | DIASTOLIC BLOOD PRESSURE: 76 MMHG

## 2018-11-25 LAB
-  AMIKACIN: SIGNIFICANT CHANGE UP
-  AMOXICILLIN/CLAVULANIC ACID: SIGNIFICANT CHANGE UP
-  AMPICILLIN/SULBACTAM: SIGNIFICANT CHANGE UP
-  AMPICILLIN: SIGNIFICANT CHANGE UP
-  AZTREONAM: SIGNIFICANT CHANGE UP
-  CEFAZOLIN: SIGNIFICANT CHANGE UP
-  CEFEPIME: SIGNIFICANT CHANGE UP
-  CEFOXITIN: SIGNIFICANT CHANGE UP
-  CEFTRIAXONE: SIGNIFICANT CHANGE UP
-  CIPROFLOXACIN: SIGNIFICANT CHANGE UP
-  ERTAPENEM: SIGNIFICANT CHANGE UP
-  GENTAMICIN: SIGNIFICANT CHANGE UP
-  IMIPENEM: SIGNIFICANT CHANGE UP
-  LEVOFLOXACIN: SIGNIFICANT CHANGE UP
-  MEROPENEM: SIGNIFICANT CHANGE UP
-  NITROFURANTOIN: SIGNIFICANT CHANGE UP
-  PIPERACILLIN/TAZOBACTAM: SIGNIFICANT CHANGE UP
-  TIGECYCLINE: SIGNIFICANT CHANGE UP
-  TOBRAMYCIN: SIGNIFICANT CHANGE UP
-  TRIMETHOPRIM/SULFAMETHOXAZOLE: SIGNIFICANT CHANGE UP
CULTURE RESULTS: SIGNIFICANT CHANGE UP
HCT VFR BLD CALC: 43.6 % — SIGNIFICANT CHANGE UP (ref 39–50)
HGB BLD-MCNC: 14.6 G/DL — SIGNIFICANT CHANGE UP (ref 13–17)
MCHC RBC-ENTMCNC: 29.4 PG — SIGNIFICANT CHANGE UP (ref 27–34)
MCHC RBC-ENTMCNC: 33.4 GM/DL — SIGNIFICANT CHANGE UP (ref 32–36)
MCV RBC AUTO: 88.1 FL — SIGNIFICANT CHANGE UP (ref 80–100)
METHOD TYPE: SIGNIFICANT CHANGE UP
ORGANISM # SPEC MICROSCOPIC CNT: SIGNIFICANT CHANGE UP
ORGANISM # SPEC MICROSCOPIC CNT: SIGNIFICANT CHANGE UP
PLATELET # BLD AUTO: 98 K/UL — LOW (ref 150–400)
RBC # BLD: 4.95 M/UL — SIGNIFICANT CHANGE UP (ref 4.2–5.8)
RBC # FLD: 13.5 % — SIGNIFICANT CHANGE UP (ref 10.3–14.5)
SPECIMEN SOURCE: SIGNIFICANT CHANGE UP
WBC # BLD: 3 K/UL — LOW (ref 3.8–10.5)
WBC # FLD AUTO: 3 K/UL — LOW (ref 3.8–10.5)

## 2018-11-25 PROCEDURE — 84132 ASSAY OF SERUM POTASSIUM: CPT

## 2018-11-25 PROCEDURE — 96375 TX/PRO/DX INJ NEW DRUG ADDON: CPT

## 2018-11-25 PROCEDURE — 81001 URINALYSIS AUTO W/SCOPE: CPT

## 2018-11-25 PROCEDURE — 87086 URINE CULTURE/COLONY COUNT: CPT

## 2018-11-25 PROCEDURE — 82947 ASSAY GLUCOSE BLOOD QUANT: CPT

## 2018-11-25 PROCEDURE — 82330 ASSAY OF CALCIUM: CPT

## 2018-11-25 PROCEDURE — 85014 HEMATOCRIT: CPT

## 2018-11-25 PROCEDURE — 83605 ASSAY OF LACTIC ACID: CPT

## 2018-11-25 PROCEDURE — 82435 ASSAY OF BLOOD CHLORIDE: CPT

## 2018-11-25 PROCEDURE — 80053 COMPREHEN METABOLIC PANEL: CPT

## 2018-11-25 PROCEDURE — 84295 ASSAY OF SERUM SODIUM: CPT

## 2018-11-25 PROCEDURE — 87040 BLOOD CULTURE FOR BACTERIA: CPT

## 2018-11-25 PROCEDURE — 99238 HOSP IP/OBS DSCHRG MGMT 30/<: CPT

## 2018-11-25 PROCEDURE — 82803 BLOOD GASES ANY COMBINATION: CPT

## 2018-11-25 PROCEDURE — 99285 EMERGENCY DEPT VISIT HI MDM: CPT | Mod: 25

## 2018-11-25 PROCEDURE — 85027 COMPLETE CBC AUTOMATED: CPT

## 2018-11-25 PROCEDURE — 87186 SC STD MICRODIL/AGAR DIL: CPT

## 2018-11-25 PROCEDURE — 96374 THER/PROPH/DIAG INJ IV PUSH: CPT

## 2018-11-25 PROCEDURE — 70450 CT HEAD/BRAIN W/O DYE: CPT

## 2018-11-25 RX ORDER — CEFUROXIME AXETIL 250 MG
1 TABLET ORAL
Qty: 28 | Refills: 0
Start: 2018-11-25 | End: 2018-12-08

## 2018-11-25 RX ADMIN — Medication 100 MILLIGRAM(S): at 05:17

## 2018-11-25 RX ADMIN — CEFTRIAXONE 100 GRAM(S): 500 INJECTION, POWDER, FOR SOLUTION INTRAMUSCULAR; INTRAVENOUS at 18:16

## 2018-11-25 RX ADMIN — Medication 100 MILLIGRAM(S): at 21:50

## 2018-11-25 RX ADMIN — OXYCODONE AND ACETAMINOPHEN 1 TABLET(S): 5; 325 TABLET ORAL at 14:21

## 2018-11-25 RX ADMIN — Medication 100 MILLIGRAM(S): at 13:47

## 2018-11-25 RX ADMIN — TAMSULOSIN HYDROCHLORIDE 0.4 MILLIGRAM(S): 0.4 CAPSULE ORAL at 21:50

## 2018-11-25 RX ADMIN — AMIKACIN SULFATE 101.96 MILLIGRAM(S): 250 INJECTION, SOLUTION INTRAMUSCULAR; INTRAVENOUS at 05:18

## 2018-11-25 RX ADMIN — ATORVASTATIN CALCIUM 80 MILLIGRAM(S): 80 TABLET, FILM COATED ORAL at 21:50

## 2018-11-25 RX ADMIN — OXYCODONE AND ACETAMINOPHEN 1 TABLET(S): 5; 325 TABLET ORAL at 21:50

## 2018-11-25 RX ADMIN — AMIKACIN SULFATE 101.96 MILLIGRAM(S): 250 INJECTION, SOLUTION INTRAMUSCULAR; INTRAVENOUS at 13:47

## 2018-11-25 RX ADMIN — OXYCODONE AND ACETAMINOPHEN 1 TABLET(S): 5; 325 TABLET ORAL at 13:51

## 2018-11-25 RX ADMIN — AMLODIPINE BESYLATE 10 MILLIGRAM(S): 2.5 TABLET ORAL at 05:17

## 2018-11-25 RX ADMIN — OXYCODONE AND ACETAMINOPHEN 1 TABLET(S): 5; 325 TABLET ORAL at 07:34

## 2018-11-25 RX ADMIN — OXYCODONE AND ACETAMINOPHEN 1 TABLET(S): 5; 325 TABLET ORAL at 08:04

## 2018-11-25 NOTE — PROGRESS NOTE ADULT - SUBJECTIVE AND OBJECTIVE BOX
Subjective    Feeling well this morning, no urinary issues. Denies pain, fevers/chills overnight. Reports intermittent headaches, improving.    Objective    Vital signs  T(F): , Max: 99.4 (11-25-18 @ 01:37)  HR: 69 (11-25-18 @ 05:03)  BP: 122/69 (11-25-18 @ 05:03)  SpO2: 94% (11-25-18 @ 05:03)  Wt(kg): --    Output     OUT:    Voided: 1750 mL  Total OUT: 1750 mL    Total NET: -1750 mL          Physical Exam  Gen: NAD  Abd: soft, NT, ND  : no churchill catheter    Labs      11-25 @ 06:59    WBC 3.0   / Hct 43.6  / SCr --       11-23 @ 16:57    WBC 6.0   / Hct 48.4  / SCr 1.01       Urine Cx: Normal  franco 10-50k  Blood Cx: NGTD

## 2018-11-25 NOTE — DISCHARGE NOTE ADULT - ADDITIONAL INSTRUCTIONS
Please follow up with your own urologist as an outpatient.  You may also follow up with Dr Lopez; call [phone] to set up an appointment Please follow up with your own urologist as an outpatient.  You may also follow up with Dr Renny Lopez; call [phone] to set up an appointment

## 2018-11-25 NOTE — DISCHARGE NOTE ADULT - MEDICATION SUMMARY - MEDICATIONS TO TAKE
I will START or STAY ON the medications listed below when I get home from the hospital:    Percocet 5/325 oral tablet  -- 1 tab(s) by mouth every 6 hours, As Needed -Moderate Pain (4 - 6) MDD:4  -- Indication: For pain control    atorvastatin 80 mg oral tablet  -- 1 tab(s) by mouth once a day  -- Indication: For Home medication    amLODIPine  -- 10  mg by mouth once a day  -- Indication: For Home medication    cefuroxime 500 mg oral tablet  -- 1 tab(s) by mouth every 12 hours   -- Finish all this medication unless otherwise directed by prescriber.  Medication should be taken with plenty of water.  Take with food or milk.    -- Indication: For Antibiotics for UTI

## 2018-11-25 NOTE — DISCHARGE NOTE ADULT - HOSPITAL COURSE
This is a 63 year old male who was admitted for fevers after a prostate biopsy 3 days prior.  He spiked a fever max of 101F, and remained afebrile for more than 24 hours afterwards.  Urine culture grew out ecoli, and his PVR was low.  He was discharged with appropriate prescriptions and instructions to follow up with his own urologist or Dr Lopez as an outpatient. This is a 63 year old male who was admitted for fevers after a prostate biopsy 3 days prior.  He spiked a fever max of 101F, and remained afebrile for more than 24 hours afterwards.  Urine culture grew out ecoli, blood cultures had no growth, and his PVR was low.  He was discharged with appropriate prescriptions and instructions to follow up with his own urologist or Dr Lopez as an outpatient.

## 2018-11-25 NOTE — DISCHARGE NOTE ADULT - CARE PROVIDERS DIRECT ADDRESSES
[email or web address]

## 2018-11-25 NOTE — DISCHARGE NOTE ADULT - CARE PLAN
Principal Discharge DX:	Acute prostatitis  Goal:	afebrile for more than 24 hours  Assessment and plan of treatment:	Home with a 2 week course of cefuroxime  Secondary Diagnosis:	HLD (hyperlipidemia)  Goal:	continue atorvastatin  Secondary Diagnosis:	Borderline hypertension  Goal:	continue amlodipine

## 2018-11-25 NOTE — DISCHARGE NOTE ADULT - PLAN OF CARE
afebrile for more than 24 hours Home with a 2 week course of cefuroxime continue atorvastatin continue amlodipine

## 2018-11-25 NOTE — PROGRESS NOTE ADULT - ASSESSMENT
63 year old male with fevers s/p TRUS biopsy of the prostate, blood cultures negative, improving clinically.    -continue antibiotics  -trend fevers  -f/u blood and urine cultures  -OOB/ambulate

## 2018-11-25 NOTE — DISCHARGE NOTE ADULT - PATIENT PORTAL LINK FT
You can access the [facility], offered by [facility], by registering with the following website: [email or web address]

## 2018-11-28 LAB
CULTURE RESULTS: SIGNIFICANT CHANGE UP
CULTURE RESULTS: SIGNIFICANT CHANGE UP
SPECIMEN SOURCE: SIGNIFICANT CHANGE UP
SPECIMEN SOURCE: SIGNIFICANT CHANGE UP

## 2020-01-29 ENCOUNTER — EMERGENCY (EMERGENCY)
Facility: HOSPITAL | Age: 65
LOS: 1 days | Discharge: HOME | End: 2020-01-29
Attending: EMERGENCY MEDICINE
Payer: COMMERCIAL

## 2020-01-29 VITALS
WEIGHT: 216.05 LBS | TEMPERATURE: 98 F | DIASTOLIC BLOOD PRESSURE: 79 MMHG | OXYGEN SATURATION: 94 % | HEART RATE: 81 BPM | RESPIRATION RATE: 16 BRPM | HEIGHT: 63 IN | SYSTOLIC BLOOD PRESSURE: 147 MMHG

## 2020-01-29 VITALS
SYSTOLIC BLOOD PRESSURE: 139 MMHG | RESPIRATION RATE: 18 BRPM | TEMPERATURE: 98 F | OXYGEN SATURATION: 99 % | DIASTOLIC BLOOD PRESSURE: 75 MMHG | HEART RATE: 81 BPM

## 2020-01-29 LAB
ALBUMIN SERPL ELPH-MCNC: 4.1 G/DL — SIGNIFICANT CHANGE UP (ref 3.5–5)
ALP SERPL-CCNC: 80 U/L — SIGNIFICANT CHANGE UP (ref 40–120)
ALT FLD-CCNC: 33 U/L DA — SIGNIFICANT CHANGE UP (ref 10–60)
ANION GAP SERPL CALC-SCNC: 9 MMOL/L — SIGNIFICANT CHANGE UP (ref 5–17)
AST SERPL-CCNC: 23 U/L — SIGNIFICANT CHANGE UP (ref 10–40)
BASOPHILS # BLD AUTO: 0.02 K/UL — SIGNIFICANT CHANGE UP (ref 0–0.2)
BASOPHILS NFR BLD AUTO: 0.4 % — SIGNIFICANT CHANGE UP (ref 0–2)
BILIRUB SERPL-MCNC: 0.5 MG/DL — SIGNIFICANT CHANGE UP (ref 0.2–1.2)
BUN SERPL-MCNC: 14 MG/DL — SIGNIFICANT CHANGE UP (ref 7–18)
CALCIUM SERPL-MCNC: 8.8 MG/DL — SIGNIFICANT CHANGE UP (ref 8.4–10.5)
CHLORIDE SERPL-SCNC: 106 MMOL/L — SIGNIFICANT CHANGE UP (ref 96–108)
CO2 SERPL-SCNC: 25 MMOL/L — SIGNIFICANT CHANGE UP (ref 22–31)
CREAT SERPL-MCNC: 1 MG/DL — SIGNIFICANT CHANGE UP (ref 0.5–1.3)
EOSINOPHIL # BLD AUTO: 0.15 K/UL — SIGNIFICANT CHANGE UP (ref 0–0.5)
EOSINOPHIL NFR BLD AUTO: 2.7 % — SIGNIFICANT CHANGE UP (ref 0–6)
GLUCOSE SERPL-MCNC: 90 MG/DL — SIGNIFICANT CHANGE UP (ref 70–99)
HCT VFR BLD CALC: 48.5 % — SIGNIFICANT CHANGE UP (ref 39–50)
HGB BLD-MCNC: 15.8 G/DL — SIGNIFICANT CHANGE UP (ref 13–17)
IMM GRANULOCYTES NFR BLD AUTO: 0.4 % — SIGNIFICANT CHANGE UP (ref 0–1.5)
LYMPHOCYTES # BLD AUTO: 1.84 K/UL — SIGNIFICANT CHANGE UP (ref 1–3.3)
LYMPHOCYTES # BLD AUTO: 32.6 % — SIGNIFICANT CHANGE UP (ref 13–44)
MCHC RBC-ENTMCNC: 29.1 PG — SIGNIFICANT CHANGE UP (ref 27–34)
MCHC RBC-ENTMCNC: 32.6 GM/DL — SIGNIFICANT CHANGE UP (ref 32–36)
MCV RBC AUTO: 89.3 FL — SIGNIFICANT CHANGE UP (ref 80–100)
MONOCYTES # BLD AUTO: 0.46 K/UL — SIGNIFICANT CHANGE UP (ref 0–0.9)
MONOCYTES NFR BLD AUTO: 8.1 % — SIGNIFICANT CHANGE UP (ref 2–14)
NEUTROPHILS # BLD AUTO: 3.16 K/UL — SIGNIFICANT CHANGE UP (ref 1.8–7.4)
NEUTROPHILS NFR BLD AUTO: 55.8 % — SIGNIFICANT CHANGE UP (ref 43–77)
NRBC # BLD: 0 /100 WBCS — SIGNIFICANT CHANGE UP (ref 0–0)
PLATELET # BLD AUTO: 179 K/UL — SIGNIFICANT CHANGE UP (ref 150–400)
POTASSIUM SERPL-MCNC: 4 MMOL/L — SIGNIFICANT CHANGE UP (ref 3.5–5.3)
POTASSIUM SERPL-SCNC: 4 MMOL/L — SIGNIFICANT CHANGE UP (ref 3.5–5.3)
PROT SERPL-MCNC: 7.3 G/DL — SIGNIFICANT CHANGE UP (ref 6–8.3)
RBC # BLD: 5.43 M/UL — SIGNIFICANT CHANGE UP (ref 4.2–5.8)
RBC # FLD: 13.4 % — SIGNIFICANT CHANGE UP (ref 10.3–14.5)
SODIUM SERPL-SCNC: 140 MMOL/L — SIGNIFICANT CHANGE UP (ref 135–145)
TROPONIN I SERPL-MCNC: <0.015 NG/ML — SIGNIFICANT CHANGE UP (ref 0–0.04)
WBC # BLD: 5.65 K/UL — SIGNIFICANT CHANGE UP (ref 3.8–10.5)
WBC # FLD AUTO: 5.65 K/UL — SIGNIFICANT CHANGE UP (ref 3.8–10.5)

## 2020-01-29 PROCEDURE — 99284 EMERGENCY DEPT VISIT MOD MDM: CPT

## 2020-01-29 PROCEDURE — 96374 THER/PROPH/DIAG INJ IV PUSH: CPT

## 2020-01-29 PROCEDURE — 99284 EMERGENCY DEPT VISIT MOD MDM: CPT | Mod: 25

## 2020-01-29 PROCEDURE — 93005 ELECTROCARDIOGRAM TRACING: CPT

## 2020-01-29 PROCEDURE — 70450 CT HEAD/BRAIN W/O DYE: CPT | Mod: 26

## 2020-01-29 PROCEDURE — 82962 GLUCOSE BLOOD TEST: CPT

## 2020-01-29 PROCEDURE — 36415 COLL VENOUS BLD VENIPUNCTURE: CPT

## 2020-01-29 PROCEDURE — 85027 COMPLETE CBC AUTOMATED: CPT

## 2020-01-29 PROCEDURE — 80053 COMPREHEN METABOLIC PANEL: CPT

## 2020-01-29 PROCEDURE — 84484 ASSAY OF TROPONIN QUANT: CPT

## 2020-01-29 PROCEDURE — 70450 CT HEAD/BRAIN W/O DYE: CPT

## 2020-01-29 RX ORDER — ONDANSETRON 8 MG/1
4 TABLET, FILM COATED ORAL ONCE
Refills: 0 | Status: COMPLETED | OUTPATIENT
Start: 2020-01-29 | End: 2020-01-29

## 2020-01-29 RX ORDER — MECLIZINE HCL 12.5 MG
1 TABLET ORAL
Qty: 30 | Refills: 0
Start: 2020-01-29

## 2020-01-29 RX ORDER — MECLIZINE HCL 12.5 MG
25 TABLET ORAL ONCE
Refills: 0 | Status: COMPLETED | OUTPATIENT
Start: 2020-01-29 | End: 2020-01-29

## 2020-01-29 RX ADMIN — Medication 25 MILLIGRAM(S): at 13:48

## 2020-01-29 RX ADMIN — ONDANSETRON 4 MILLIGRAM(S): 8 TABLET, FILM COATED ORAL at 13:48

## 2020-01-29 NOTE — ED PROVIDER NOTE - CLINICAL SUMMARY MEDICAL DECISION MAKING FREE TEXT BOX
63 y/o M pt presents with vertiginous symptoms described as room spinning. Will obtain head CT, give supportive care and reassess.

## 2020-01-29 NOTE — ED PROVIDER NOTE - PROGRESS NOTE DETAILS
Cooperative/Alert/Awake
CT with questionable mastoiditis.  Pt non-tender on exam with no ear discharge.  case discussed with ENT, recommend discharge with ENT clinic follow up for audiogram.  pt comfortable with plan.  Will d/c

## 2020-01-29 NOTE — ED ADULT TRIAGE NOTE - CHIEF COMPLAINT QUOTE
ID [id number]  feeling dizzy for 3days, hearing noises on both ears since Saturday. was on treatment not getting better    has h/o vertigo, denies chest pain or SOb

## 2020-01-29 NOTE — ED ADULT NURSE NOTE - OBJECTIVE STATEMENT
presents with c/o vertigo on/off for 3 month worsen in last 3 days  denies chest pain headache vision changes or extremities weakness.

## 2020-01-29 NOTE — ED ADULT NURSE NOTE - CHIEF COMPLAINT QUOTE
ID [id number]  feeling dizzy for 3days, hearing noises on both ears since Saturday. was on treatment not getting better    has h/o vertigo, denies chest pain or SOb

## 2020-01-29 NOTE — ED PROVIDER NOTE - PATIENT PORTAL LINK FT
You can access the FollowMyHealth Patient Portal offered by [facility] by registering at the following website: [email or web address]. By joining [facility]’s FollowMyHealth portal, you will also be able to view your health information using other applications (apps) compatible with our system.

## 2020-01-29 NOTE — ED PROVIDER NOTE - OBJECTIVE STATEMENT
63 y/o M pt with a PMHx of HTN, HLD, vertigo, presents to the ED with complaints of dizziness described as room spinning. Patient reports he tries to use the medication prescribed and it did not help (does not know how to pronounce name). Patient denies nausea, vomiting, chest pain or any other symptoms. NKDA.

## 2020-02-19 ENCOUNTER — APPOINTMENT (OUTPATIENT)
Dept: OTOLARYNGOLOGY | Facility: CLINIC | Age: 65
End: 2020-02-19
Payer: COMMERCIAL

## 2020-02-19 VITALS
WEIGHT: 215 LBS | SYSTOLIC BLOOD PRESSURE: 142 MMHG | BODY MASS INDEX: 38.09 KG/M2 | DIASTOLIC BLOOD PRESSURE: 84 MMHG | HEIGHT: 63 IN | HEART RATE: 76 BPM

## 2020-02-19 DIAGNOSIS — T16.1XXA FOREIGN BODY IN RIGHT EAR, INITIAL ENCOUNTER: ICD-10-CM

## 2020-02-19 PROCEDURE — 69200 CLEAR OUTER EAR CANAL: CPT | Mod: RT

## 2020-02-19 PROCEDURE — 99204 OFFICE O/P NEW MOD 45 MIN: CPT | Mod: 25

## 2020-02-19 RX ORDER — AMOXICILLIN AND CLAVULANATE POTASSIUM 875; 125 MG/1; MG/1
875-125 TABLET, COATED ORAL TWICE DAILY
Qty: 28 | Refills: 1 | Status: COMPLETED | COMMUNITY
Start: 2020-02-19 | End: 2020-03-18

## 2020-02-19 NOTE — PROCEDURE
[FreeTextEntry2] : foreign body [FreeTextEntry3] : micro w removal functioning  inflamed rt vent tube\par me mucosa and tm erythematous and thickened

## 2020-02-19 NOTE — HISTORY OF PRESENT ILLNESS
[de-identified] : rt ear pain and room spinning persistent x 2 mo \par worse 2 weeks ago\par ad otorrhea\par hx ear problems, ?hue ad plugged\par ct scan head no antibiotics\par au vent tubes 8 y ago

## 2020-02-19 NOTE — PHYSICAL EXAM
[Midline] : trachea located in midline position [Normal] : no rashes [de-identified] : micro as attic perf dry no sign cholesteatoma, ad tm red thickened vent tube in tm w foul otorrhea

## 2020-02-19 NOTE — ASSESSMENT
[FreeTextEntry1] : ct brain 1/29/20 opacification rt mastoid air cells and middle ear\par reomoval ad vnet tube\par chronic rt mastoiditis and associated vertigo ?canal fistula\par as dry attic perf\par ofloxin gtts\par amox clav 875\par fu 2 weeks consider t bone ct

## 2020-02-19 NOTE — REASON FOR VISIT
[Initial Consultation] : an initial consultation for [Ear Pain] : ear pain [FreeTextEntry2] : dizziness

## 2020-02-19 NOTE — REVIEW OF SYSTEMS
[Hearing Loss] : hearing loss [Dizziness] : dizziness [Vertigo] : vertigo [Recurrent Ear Infections] : recurrent ear infections [Ear Drainage] : ear drainage [Negative] : Endocrine [Patient Intake Form Reviewed] : Patient intake form was reviewed

## 2020-03-04 ENCOUNTER — APPOINTMENT (OUTPATIENT)
Dept: OTOLARYNGOLOGY | Facility: CLINIC | Age: 65
End: 2020-03-04
Payer: COMMERCIAL

## 2020-03-04 VITALS — WEIGHT: 215 LBS | BODY MASS INDEX: 38.09 KG/M2 | HEIGHT: 63 IN

## 2020-03-04 DIAGNOSIS — H72.92 UNSPECIFIED PERFORATION OF TYMPANIC MEMBRANE, LEFT EAR: ICD-10-CM

## 2020-03-04 PROCEDURE — 99214 OFFICE O/P EST MOD 30 MIN: CPT | Mod: 25

## 2020-03-04 PROCEDURE — 92557 COMPREHENSIVE HEARING TEST: CPT

## 2020-03-04 PROCEDURE — 92567 TYMPANOMETRY: CPT

## 2020-03-04 NOTE — ASSESSMENT
[FreeTextEntry1] : resoved rt otorrhea\par ad attic  retraction w squamous debris consistent w cholesteatoma\par audio  ad cond loss\par vertigo resolved\par ct temporal bone\par fu recommended w Dr. Torres

## 2020-03-04 NOTE — PHYSICAL EXAM
[Normal] : mucosa is normal [Midline] : trachea located in midline position [de-identified] : as dry attic perfad attic  w retraction and squamous debris no otorrhea today

## 2020-03-16 ENCOUNTER — RESULT REVIEW (OUTPATIENT)
Age: 65
End: 2020-03-16

## 2020-03-16 ENCOUNTER — APPOINTMENT (OUTPATIENT)
Dept: CT IMAGING | Facility: CLINIC | Age: 65
End: 2020-03-16
Payer: COMMERCIAL

## 2020-03-16 ENCOUNTER — OUTPATIENT (OUTPATIENT)
Dept: OUTPATIENT SERVICES | Facility: HOSPITAL | Age: 65
LOS: 1 days | End: 2020-03-16
Payer: COMMERCIAL

## 2020-03-16 DIAGNOSIS — H92.11 OTORRHEA, RIGHT EAR: ICD-10-CM

## 2020-03-16 PROCEDURE — 70480 CT ORBIT/EAR/FOSSA W/O DYE: CPT

## 2020-03-16 PROCEDURE — 70480 CT ORBIT/EAR/FOSSA W/O DYE: CPT | Mod: 26

## 2020-03-18 ENCOUNTER — APPOINTMENT (OUTPATIENT)
Dept: OTOLARYNGOLOGY | Facility: CLINIC | Age: 65
End: 2020-03-18
Payer: COMMERCIAL

## 2020-03-18 VITALS — WEIGHT: 215 LBS | HEIGHT: 63 IN | BODY MASS INDEX: 38.09 KG/M2

## 2020-03-18 PROCEDURE — 99214 OFFICE O/P EST MOD 30 MIN: CPT | Mod: 25

## 2020-03-18 PROCEDURE — 69210 REMOVE IMPACTED EAR WAX UNI: CPT

## 2020-03-18 NOTE — HISTORY OF PRESENT ILLNESS
[de-identified] : 64M with long standing bilateral chronic ear disease.  Right hearing loss present for years, constant, nonfluctuating, moderate severity.  Has had prior tubes placed.  Recurring right ear drainage.

## 2020-03-18 NOTE — PHYSICAL EXAM
[Normal] : no rashes [de-identified] : pars flaccida cholesteatoma with debris, inferior eardrum intact but with thickening/myringitis; L TM with pars flaccida retraction, no active debris or infection [FreeTextEntry2] : Facial nerve function is House Brackmann 1/6 in right ear and 1/6 in left ear.

## 2020-03-18 NOTE — DATA REVIEWED
[de-identified] : I personally reviewed the patient's audiogram, which shows right mixed hearing loss with a-b gap, moderate severity; left ear with mild mostly SNHL.\par

## 2020-03-18 NOTE — PROCEDURE
[FreeTextEntry3] : Procedure note:  Right cerumenectomy\par \par Mar 18, 2020 \par \par Description of Procedure:  Cerumen impaction was noted requiring debridement under the operating microscope using otologic instrumentation.  The patient tolerated the procedure without complications.\par \par

## 2020-06-26 ENCOUNTER — APPOINTMENT (OUTPATIENT)
Dept: OTOLARYNGOLOGY | Facility: CLINIC | Age: 65
End: 2020-06-26
Payer: COMMERCIAL

## 2020-06-26 VITALS — WEIGHT: 215 LBS | HEIGHT: 63 IN | BODY MASS INDEX: 38.09 KG/M2

## 2020-06-26 PROCEDURE — 99214 OFFICE O/P EST MOD 30 MIN: CPT | Mod: 25

## 2020-06-26 PROCEDURE — 69210 REMOVE IMPACTED EAR WAX UNI: CPT

## 2020-06-26 NOTE — HISTORY OF PRESENT ILLNESS
[de-identified] : 65 year male here for right ear swelling. States right otalgia started 06/25/2020, went to [facility] emergency and was prescribed amoxicillin and ibuprofen. Denies otorrhea, tinnitus, dizziness, vertigo, headaches related to hearing, fevers, chills.  Swelling began after using a q-tip in the ear.  Associated right hearing loss which has worsened.

## 2020-06-26 NOTE — PROCEDURE
[FreeTextEntry3] : Procedure note:  Right cerumenectomy\par \par Jun 26, 2020 \par \par Description of Procedure:  Ceruminous debris was noted requiring debridement under the operating microscope using otologic instrumentation.  The patient tolerated the procedure without complications.\par \par

## 2020-06-26 NOTE — PHYSICAL EXAM
[de-identified] : AD:  otitis externa with canal edema, ceruminous debris removed, wick placed [de-identified] : could not clearly visualize TM [Normal] : no rashes [FreeTextEntry2] : Facial nerve function is House Brackmann 1/6 in right ear and 1/6 in left ear.

## 2020-07-01 ENCOUNTER — APPOINTMENT (OUTPATIENT)
Dept: OTOLARYNGOLOGY | Facility: CLINIC | Age: 65
End: 2020-07-01
Payer: COMMERCIAL

## 2020-07-01 VITALS — HEIGHT: 63 IN | WEIGHT: 215 LBS | BODY MASS INDEX: 38.09 KG/M2 | TEMPERATURE: 97.8 F

## 2020-07-01 PROCEDURE — 69210 REMOVE IMPACTED EAR WAX UNI: CPT

## 2020-07-01 PROCEDURE — 99213 OFFICE O/P EST LOW 20 MIN: CPT | Mod: 25

## 2020-07-01 NOTE — REASON FOR VISIT
[Subsequent Evaluation] : a subsequent evaluation for [FreeTextEntry2] : ear pain, surgery (was in hospital for ear pain)

## 2020-07-01 NOTE — PHYSICAL EXAM
[de-identified] : edema of ear canal improved [de-identified] : stable attic retraction pocket [Normal] : no rashes [FreeTextEntry2] : Facial nerve function is House Brackmann 1/6 in right ear and 1/6 in left ear.

## 2020-07-01 NOTE — PROCEDURE
[FreeTextEntry3] : Procedure note:  Right cerumenectomy\par \par Jul 01, 2020 \par \par Description of Procedure:  Cerumen impaction was noted requiring debridement under the operating microscope using otologic instrumentation.  The patient tolerated the procedure without complications.\par \par

## 2020-07-27 ENCOUNTER — OUTPATIENT (OUTPATIENT)
Dept: OUTPATIENT SERVICES | Facility: HOSPITAL | Age: 65
LOS: 1 days | End: 2020-07-27
Payer: COMMERCIAL

## 2020-07-27 VITALS
DIASTOLIC BLOOD PRESSURE: 82 MMHG | HEIGHT: 63 IN | WEIGHT: 225.97 LBS | HEART RATE: 68 BPM | TEMPERATURE: 98 F | OXYGEN SATURATION: 98 % | RESPIRATION RATE: 16 BRPM | SYSTOLIC BLOOD PRESSURE: 148 MMHG

## 2020-07-27 DIAGNOSIS — I10 ESSENTIAL (PRIMARY) HYPERTENSION: ICD-10-CM

## 2020-07-27 DIAGNOSIS — Z78.9 OTHER SPECIFIED HEALTH STATUS: ICD-10-CM

## 2020-07-27 DIAGNOSIS — Z98.890 OTHER SPECIFIED POSTPROCEDURAL STATES: Chronic | ICD-10-CM

## 2020-07-27 DIAGNOSIS — H70.11 CHRONIC MASTOIDITIS, RIGHT EAR: ICD-10-CM

## 2020-07-27 DIAGNOSIS — H92.01 OTALGIA, RIGHT EAR: ICD-10-CM

## 2020-07-27 DIAGNOSIS — Z98.84 BARIATRIC SURGERY STATUS: Chronic | ICD-10-CM

## 2020-07-27 DIAGNOSIS — G47.33 OBSTRUCTIVE SLEEP APNEA (ADULT) (PEDIATRIC): ICD-10-CM

## 2020-07-27 LAB
ANION GAP SERPL CALC-SCNC: 12 MMO/L — SIGNIFICANT CHANGE UP (ref 7–14)
BUN SERPL-MCNC: 9 MG/DL — SIGNIFICANT CHANGE UP (ref 7–23)
CALCIUM SERPL-MCNC: 9.2 MG/DL — SIGNIFICANT CHANGE UP (ref 8.4–10.5)
CHLORIDE SERPL-SCNC: 102 MMOL/L — SIGNIFICANT CHANGE UP (ref 98–107)
CO2 SERPL-SCNC: 26 MMOL/L — SIGNIFICANT CHANGE UP (ref 22–31)
CREAT SERPL-MCNC: 0.79 MG/DL — SIGNIFICANT CHANGE UP (ref 0.5–1.3)
GLUCOSE SERPL-MCNC: 151 MG/DL — HIGH (ref 70–99)
HCT VFR BLD CALC: 48.5 % — SIGNIFICANT CHANGE UP (ref 39–50)
HGB BLD-MCNC: 16.3 G/DL — SIGNIFICANT CHANGE UP (ref 13–17)
MCHC RBC-ENTMCNC: 30 PG — SIGNIFICANT CHANGE UP (ref 27–34)
MCHC RBC-ENTMCNC: 33.6 % — SIGNIFICANT CHANGE UP (ref 32–36)
MCV RBC AUTO: 89.2 FL — SIGNIFICANT CHANGE UP (ref 80–100)
NRBC # FLD: 0 K/UL — SIGNIFICANT CHANGE UP (ref 0–0)
PLATELET # BLD AUTO: 176 K/UL — SIGNIFICANT CHANGE UP (ref 150–400)
PMV BLD: 10.1 FL — SIGNIFICANT CHANGE UP (ref 7–13)
POTASSIUM SERPL-MCNC: 4.1 MMOL/L — SIGNIFICANT CHANGE UP (ref 3.5–5.3)
POTASSIUM SERPL-SCNC: 4.1 MMOL/L — SIGNIFICANT CHANGE UP (ref 3.5–5.3)
RBC # BLD: 5.44 M/UL — SIGNIFICANT CHANGE UP (ref 4.2–5.8)
RBC # FLD: 13.2 % — SIGNIFICANT CHANGE UP (ref 10.3–14.5)
SODIUM SERPL-SCNC: 140 MMOL/L — SIGNIFICANT CHANGE UP (ref 135–145)
WBC # BLD: 4.39 K/UL — SIGNIFICANT CHANGE UP (ref 3.8–10.5)
WBC # FLD AUTO: 4.39 K/UL — SIGNIFICANT CHANGE UP (ref 3.8–10.5)

## 2020-07-27 PROCEDURE — 93010 ELECTROCARDIOGRAM REPORT: CPT

## 2020-07-27 RX ORDER — AMLODIPINE BESYLATE 2.5 MG/1
10 TABLET ORAL
Qty: 0 | Refills: 0 | DISCHARGE

## 2020-07-27 RX ORDER — ATORVASTATIN CALCIUM 80 MG/1
1 TABLET, FILM COATED ORAL
Qty: 0 | Refills: 0 | DISCHARGE

## 2020-07-27 NOTE — H&P PST ADULT - LIVES WITH, PROFILE
Interval HPI:   Overnight events: hyperglycemia improved  Eatin%  Nausea: No  Hypoglycemia and intervention: No  Fever: No  TPN and/or TF: No  If yes, type of TF/TPN and rate: n/a    PMH, PSH, FH, SH updated and reviewed       Review of Systems   Constitutional: + fatigue, weakness; Negative for weight changes.  Eyes: Negative for visual disturbance.  Respiratory: Negative for cough.   Cardiovascular: Negative for chest pain.  Gastrointestinal: Negative for nausea.  Endocrine: Negative for polyuria, polydipsia.  Musculoskeletal: Negative for back pain.  Skin: Negative for rash.  Neurological: Negative for syncope.  Psychiatric/Behavioral: Negative for depression.    PHYSICAL EXAMINATION:  Vitals:    18 1145   BP: (!) 152/75   Pulse: 76   Resp: 18   Temp: 98.5 °F (36.9 °C)     Body mass index is 20.37 kg/m².    Physical Exam   Constitutional:  Well developed, well nourished, NAD.  ENT: External ears no masses with nose patent; normal hearing.   Neck:  Supple; trachea midline; no thyromegaly.   Cardiovascular: Normal heart sounds, no LE edema.     Lungs:  Normal effort; lungs anterior bilaterally clear to auscultation.  Abdomen:  Soft, no masses,  no hernias.  MS: No clubbing or cyanosis of nails noted; normal gait or unable to assess gait.  Skin: No rashes, lesions, or ulcers; no nodules.  Psychiatric: Good judgement and insight; normal mood and affect.  Neurological: generalized weakness R>L. decreased vibration sense in the bilateral lower extremities.   daughter/children

## 2020-07-27 NOTE — H&P PST ADULT - NSICDXPROBLEM_GEN_ALL_CORE_FT
PROBLEM DIAGNOSES  Problem: Chronic mastoiditis, right  Assessment and Plan: preop for tympanomastoidectomy, cartilage graft, tissue graft on 8/4/20  preop instructions given to daughter and patient- understanding verbalized   GI prophylaxis provided   pt scheduled for COVID testing on 8/1/20    Problem: HTN (hypertension)  Assessment and Plan: pt will take amlodipine/atorvastain AM of surgery with sips of water    Problem: Poor historian  Assessment and Plan: cardiac clearance requested (h/o HTN, DVT in 2019, EKG with PAC's)  pt states ECHO was done last year- copy requested with clearance     Problem: HAMIDA (obstructive sleep apnea)  Assessment and Plan: h/o HAMIDA not on CPAP since UPP surgery  OR booking notified via fax

## 2020-07-27 NOTE — H&P PST ADULT - NSICDXPASTMEDICALHX_GEN_ALL_CORE_FT
PAST MEDICAL HISTORY:  Bilateral otitis media     Chronic mastoiditis right ear    DVT (deep venous thrombosis) LLE in 2019 treated with eliquis for 6 months    HLD (hyperlipidemia)     HTN (hypertension)     Lumbar herniated disc     Migraine headache     Obstructive sleep apnea on C PAP no longer using CPAP machine    Otalgia, right ear     Poor historian     Prediabetes pt states A1c is 6. denies current use of meds    Renal cyst, left     Unspecified cholesteatoma, right ear

## 2020-07-27 NOTE — H&P PST ADULT - NSICDXPASTSURGICALHX_GEN_ALL_CORE_FT
PAST SURGICAL HISTORY:  H/O prostate biopsy     History of umbilical hernia repair x2    History of weight loss surgery 5 years ago in [place]    S/P shoulder surgery left shoulder    S/P UPPP (uvulopalatopharyngoplasty) >5 years ago

## 2020-07-27 NOTE — H&P PST ADULT - OTHER CARE PROVIDERS
Cardiologist Dr. Conde- [phone]   Vascular Dr. Simone Fenton Cardiologist Dr. Conde- [phone] fax [phone]  Vascular Dr. Simone Fenton

## 2020-07-27 NOTE — H&P PST ADULT - MALLAMPATI CLASS
no uvula/Class I (easy) - visualization of the soft palate, fauces, uvula, and both anterior and posterior pillars

## 2020-07-27 NOTE — H&P PST ADULT - NEGATIVE NEUROLOGICAL SYMPTOMS
no paresthesias/no syncope/denies CVA or stroke/no difficulty walking/no headache/no focal seizures/no confusion/no tremors/no weakness/no generalized seizures

## 2020-07-27 NOTE — H&P PST ADULT - HISTORY OF PRESENT ILLNESS
66 y/o Amharic speaking male presents to [facility] preop for tympanomastoidectomy cartilage graft, tissue graft on 8/4/2020. preop dx of chronic mastoiditis, unspecified cholesteatoma and otalgia, right ear. pt reports chronic ear infections since childhood. more recently he is experiencing right ear pain and drainage. 64 y/o Romansh speaking male presents to [facility] preop for tympanomastoidectomy, cartilage graft, tissue graft on 8/4/2020. preop dx of chronic mastoiditis, unspecified cholesteatoma and otalgia, right ear. pt reports chronic ear infections since childhood. more recently he is experiencing right ear pain and drainage.

## 2020-07-27 NOTE — H&P PST ADULT - ABILITY TO HEAR (WITH HEARING AID OR HEARING APPLIANCE IF NORMALLY USED):
Mildly to Moderately Impaired: difficulty hearing in some environments or speaker may need to increase volume or speak distinctly/right ear

## 2020-08-01 ENCOUNTER — APPOINTMENT (OUTPATIENT)
Dept: DISASTER EMERGENCY | Facility: CLINIC | Age: 65
End: 2020-08-01

## 2020-08-01 DIAGNOSIS — Z01.818 ENCOUNTER FOR OTHER PREPROCEDURAL EXAMINATION: ICD-10-CM

## 2020-08-02 LAB — SARS-COV-2 N GENE NPH QL NAA+PROBE: NOT DETECTED

## 2020-08-03 ENCOUNTER — TRANSCRIPTION ENCOUNTER (OUTPATIENT)
Age: 65
End: 2020-08-03

## 2020-08-03 NOTE — ASU PREOPERATIVE ASSESSMENT, ADULT (IPARK ONLY) - INFO GIVEN TO
Macanese int ID [id number] Cheyenne/patient [place] Islander int ID [id number] Cheyenne/patient patient/Mongolian int ID [id number] Cheyenne Grenadian int ID [id number] Cheyenne/patient North Korean int ID [id number] Cheyenne/patient

## 2020-08-04 ENCOUNTER — RESULT REVIEW (OUTPATIENT)
Age: 65
End: 2020-08-04

## 2020-08-04 ENCOUNTER — APPOINTMENT (OUTPATIENT)
Dept: OTOLARYNGOLOGY | Facility: AMBULATORY SURGERY CENTER | Age: 65
End: 2020-08-04

## 2020-08-04 ENCOUNTER — OUTPATIENT (OUTPATIENT)
Dept: OUTPATIENT SERVICES | Facility: HOSPITAL | Age: 65
LOS: 1 days | Discharge: HOME | End: 2020-08-04
Payer: COMMERCIAL

## 2020-08-04 VITALS
WEIGHT: 225.97 LBS | HEART RATE: 64 BPM | RESPIRATION RATE: 18 BRPM | TEMPERATURE: 98 F | SYSTOLIC BLOOD PRESSURE: 145 MMHG | DIASTOLIC BLOOD PRESSURE: 68 MMHG | HEIGHT: 63 IN | OXYGEN SATURATION: 96 %

## 2020-08-04 VITALS
OXYGEN SATURATION: 98 % | DIASTOLIC BLOOD PRESSURE: 65 MMHG | RESPIRATION RATE: 14 BRPM | SYSTOLIC BLOOD PRESSURE: 123 MMHG | HEART RATE: 73 BPM | TEMPERATURE: 98 F

## 2020-08-04 DIAGNOSIS — Z98.890 OTHER SPECIFIED POSTPROCEDURAL STATES: Chronic | ICD-10-CM

## 2020-08-04 DIAGNOSIS — Z98.84 BARIATRIC SURGERY STATUS: Chronic | ICD-10-CM

## 2020-08-04 DIAGNOSIS — H92.01 OTALGIA, RIGHT EAR: ICD-10-CM

## 2020-08-04 PROCEDURE — 69436 CREATE EARDRUM OPENING: CPT | Mod: 59

## 2020-08-04 PROCEDURE — 69644 REVISE MIDDLE EAR & MASTOID: CPT

## 2020-08-04 PROCEDURE — 88304 TISSUE EXAM BY PATHOLOGIST: CPT | Mod: 26

## 2020-08-04 PROCEDURE — 15769 GRFG AUTOL SOFT TISS DIR EXC: CPT

## 2020-08-04 PROCEDURE — 21235 EAR CARTILAGE GRAFT: CPT

## 2020-08-04 PROCEDURE — 88311 DECALCIFY TISSUE: CPT | Mod: 26

## 2020-08-04 RX ORDER — OXYCODONE AND ACETAMINOPHEN 5; 325 MG/1; MG/1
1 TABLET ORAL ONCE
Refills: 0 | Status: DISCONTINUED | OUTPATIENT
Start: 2020-08-04 | End: 2020-08-04

## 2020-08-04 NOTE — BRIEF OPERATIVE NOTE - NSICDXBRIEFPROCEDURE_GEN_ALL_CORE_FT
PROCEDURES:  Tympanomastoidectomy with ossiculoplasty of right ear 04-Aug-2020 18:19:22  Jennifer Barrientos

## 2020-08-04 NOTE — BRIEF OPERATIVE NOTE - OPERATION/FINDINGS
right cholesteatoma with involvement of malleus and incus which were removed  PORP prosthesis placed   Cartilage and fascia graft  Ear vent tube placed into ear canal   s/p mastoidectomy

## 2020-08-04 NOTE — ASU DISCHARGE PLAN (ADULT/PEDIATRIC) - CARE PROVIDER_API CALL
James Torres)  Neurotology; Otolaryngology  [address] [place] [address]  Phone: [phone]  Fax: [phone]  Follow Up Time:

## 2020-08-10 PROBLEM — I10 ESSENTIAL (PRIMARY) HYPERTENSION: Chronic | Status: ACTIVE | Noted: 2017-11-08

## 2020-08-10 PROBLEM — H70.10 CHRONIC MASTOIDITIS, UNSPECIFIED EAR: Chronic | Status: ACTIVE | Noted: 2020-07-27

## 2020-08-10 PROBLEM — M51.26 OTHER INTERVERTEBRAL DISC DISPLACEMENT, LUMBAR REGION: Chronic | Status: ACTIVE | Noted: 2020-07-27

## 2020-08-12 LAB — SURGICAL PATHOLOGY STUDY: SIGNIFICANT CHANGE UP

## 2020-08-13 ENCOUNTER — APPOINTMENT (OUTPATIENT)
Dept: OTOLARYNGOLOGY | Facility: CLINIC | Age: 65
End: 2020-08-13
Payer: COMMERCIAL

## 2020-08-13 VITALS
WEIGHT: 215 LBS | HEIGHT: 63 IN | SYSTOLIC BLOOD PRESSURE: 114 MMHG | BODY MASS INDEX: 38.09 KG/M2 | HEART RATE: 70 BPM | DIASTOLIC BLOOD PRESSURE: 73 MMHG

## 2020-08-13 PROCEDURE — 99024 POSTOP FOLLOW-UP VISIT: CPT

## 2020-08-13 RX ORDER — OXYCODONE HYDROCHLORIDE 5 MG/1
5 CAPSULE ORAL EVERY 6 HOURS
Qty: 15 | Refills: 0 | Status: DISCONTINUED | COMMUNITY
Start: 2020-08-13 | End: 2020-08-13

## 2020-08-13 NOTE — PHYSICAL EXAM
[de-identified] : postauricular incision c/d/i, mild superior edema, no fluctuance [FreeTextEntry2] : Facial nerve function is House Brackmann 1/6 in right ear and 1/6 in left ear. [de-identified] : packing removed from ear canal, edema of TM reconstruction, intact, patent subannular tube; Nichols to right, Rinne positive right ear [de-identified] : no spontaneous or gaze evoked nystagmus

## 2020-08-13 NOTE — REASON FOR VISIT
[Subsequent Evaluation] : a subsequent evaluation for [FreeTextEntry2] : s/p Right intact canal wall tympanoplasty with mastoidectomy, ossicular reconstruction,  cartilage graft, fascia graft, right ear tube insertion, 08/04/2020.

## 2020-08-13 NOTE — HISTORY OF PRESENT ILLNESS
[de-identified] : 65 year male s/p Right intact canal wall tympanoplasty with mastoidectomy, ossicular reconstruction, cartilage graft, fascia graft, right ear tube insertion, 08/04/2020. States feels hearing has improved. States minor right otalgia. States some dizziness when he bends to  things from floor. Denies drainage. Denies fevers, chills, pain after surgery. Currently still has churchill cath in place for urinary retention, will see urologist tomorrow.

## 2020-12-06 NOTE — PATIENT PROFILE ADULT - PATIENT/CAREGIVER OFFERED  INTERPRETER SERVICES
INFECTIOUS DISEASE PROGRESS NOTE       SUBJECTIVE: Respiratory status remains stable, currently on room air.  Denies any current shortness of breath.  Occasional dry cough.  No fevers or chills.  No nausea or diarrhea.    Medications (23) Active  Scheduled: (13)  ascorbic acid 500mg Tab  1,500 mg 3 tab, PO, qDay  cetirizine 10 mg Tab  10 mg 1 tab, PO, qDay  cholecalciferol 1,000 unit (25 mcg) Tab  2,000 unit 2 tab, PO, qDay  enoxaparin 40 mg/0.4 mL Syringe  40 mg 0.4 mL, SubQ, qDay  famotidine 20 mg Tab  40 mg 2 tab, PO, qDay  Initiate Magnesium Replacement Protocol  Per MD Order, qDay  Initiate Potassium Replacement Protocol  Per MD Order, qDay  insulin glargine 100 units/mL Vial (Lantus)  20 unit 0.2 mL, SubQ, qAM  insulin lispro 100 units/mL Vial  sliding scale, SubQ, QIDACHS  predniSONE 20 mg Tab  40 mg 2 tab, PO, qDayPCB  remdesivir (COVID-19) EUA + Sodium Chloride 0.9% 230 mL  100 mg 20 mL, IV Piggyback, q24hr  thiamine 100 mg Tab  100 mg 1 tab, PO, qDay  zinc sulfate 220 mg Cap  220 mg 1 cap, PO, qDay  Continuous: (0)  PRN: (10)  acetaminophen 325 mg Tab  650 mg 2 tab, PO, q4hr  albuterol sulfate HFA 90 mcg/puff Inhaler  2 puff, Inhalation, Q4RT  Dextrose 50% Syringe  25 g 50 mL, IV Push, PRN  Dextrose 50% Syringe  12.5 g 25 mL, IV Push, PRN  glucagon recomb 1 mg Vial  1 mg, IM, PRN  guaiFENesin--20mg/10 mL Syrup  10 mL, PO, q4hr  loperamide HCl 2 mg Cap  2 mg 1 cap, PO, q6hr  ondansetron 4 mg/2 mL Vial  4 mg 2 mL, IV Push, q6hr  Oral Glucose Gel (40% dextrose)  15 g 37.6 mL, PO, PRN  temazepam 7.5 mg Cap  7.5 mg 1 cap, PO, qHS      OBJECTIVE:    GENERAL: In no acute distress, appears comfortable.  Awake and alert and orientated x3.  Vital Signs (last 24 hrs)_____ Last Charted___________Minimum____________ Maximum____________  Temp    98.1  (OCT 29 12:04) 97.8  (OCT 29 08:25) H 100.6 (OCT 28 15:22)  Heart Rate   81  (OCT 29 12:04) 75  (OCT 29 03:19) 89  (OCT 28 15:22)  Resp Rate       20  (OCT 29  12:04) 16  (OCT 28 19:54) H 22 (OCT 29 03:19)  SBP    H 151 (OCT 29 12:04) 122  (OCT 28 23:08) H 151 (OCT 28 15:22)  DBP    84  (OCT 29 12:04) 73  (OCT 28 23:08) 87  (OCT 28 15:22)      HEENT: Oral mucosa is moist.  No signs of thrush    HEART: S1 and S2 are audible.  No murmur noted.    LUNGS: Few crackles noted to the bases.  No wheezing    ABDOMEN: Soft nondistended bowel sounds are present.    EXTREMITIES: No edema, clubbing  or cyanosis    SKIN: No rashes or lesions    Labs (Last four charted values)  WBC                  7.7 (OCT 29) 7.5 (OCT 28) 7.2 (OCT 27) 9.3 (OCT 26)  Hgb                  13.2 (OCT 29) 13.1 (OCT 28) 13.1 (OCT 27) 12.7 (OCT 26)  Hct                  40 (OCT 29) 39 (OCT 28) 38 (OCT 27) 37 (OCT 26)  Plt                  222 (OCT 29) 185 (OCT 28) 164 (OCT 27) L 145 (OCT 26)  Na                   L 135 (OCT 29) L 133 (OCT 28) L 134 (OCT 27) L 135 (OCT 26)  K                    4.5 (OCT 29) 4.0 (OCT 28) 4.0 (OCT 27) 4.5 (OCT 26)  CO2                  20 (OCT 29) L 18 (OCT 28) 19 (OCT 27) 21 (OCT 26)  Cl                   106 (OCT 29) 105 (OCT 28) 105 (OCT 27) 105 (OCT 26)  Cr                   H 1.75 (OCT 29) H 1.67 (OCT 28) H 1.67 (OCT 27) H 1.77 (OCT 26)  BUN                  H 32 (OCT 29) H 23 (OCT 28) H 21 (OCT 27) H 21 (OCT 26)  Glucose              H 109 (OCT 29) 93 (OCT 28) 77 (OCT 27) H 105 (OCT 26)  Mg                   2.0 (OCT 29) 2.1 (OCT 28) 1.7 (OCT 27) 1.7 (OCT 26)  Phos                 3.4 (OCT 29) 3.0 (OCT 28)   Ca                   L 8.3 (OCT 29) L 7.6 (OCT 28) L 7.8 (OCT 27) L 7.4 (OCT 26)     .2  Ferritin 1057.9  D-dimer 0.24    IMPRESSION:    1. COVID-19 pneumonitis, presenting with fevers and constitutional symptoms.  Inflammatory markers are high but stable.  Was started on prednisone yesterday to help with cytokine storm.  Does not meet criteria for Decadron or Actemra.  2. History of diabetes.  3. Multiple myeloma.  4. Acute kidney injury with underlying history  of chronic kidney disease.    RECOMMENDATIONS:    1. Continue remdesivir per protocol.  2. Continue prednisone 40 mg p.o. daily  3. CRP, ferritin and D-dimer in a.m.  4. Prophylactic Lovenox 40 mg subcu daily.  5. Monitor respiratory status  6. The above was discussed with the patient and nursing staff  7. The above was discussed and reviewed in detail with Dr. Rodriguez          Electronically Signed On 10.29.2020 14:33  ___________________________________________________   Monika Lopez NP   yes

## 2021-09-02 ENCOUNTER — APPOINTMENT (OUTPATIENT)
Dept: OTOLARYNGOLOGY | Facility: CLINIC | Age: 66
End: 2021-09-02
Payer: COMMERCIAL

## 2021-09-02 VITALS
HEIGHT: 63 IN | HEART RATE: 67 BPM | SYSTOLIC BLOOD PRESSURE: 121 MMHG | BODY MASS INDEX: 35.44 KG/M2 | DIASTOLIC BLOOD PRESSURE: 75 MMHG | WEIGHT: 200 LBS

## 2021-09-02 PROCEDURE — 99214 OFFICE O/P EST MOD 30 MIN: CPT | Mod: 25

## 2021-09-02 PROCEDURE — 69210 REMOVE IMPACTED EAR WAX UNI: CPT

## 2021-09-02 RX ORDER — CEPHALEXIN 500 MG/1
500 CAPSULE ORAL 4 TIMES DAILY
Qty: 28 | Refills: 0 | Status: COMPLETED | COMMUNITY
Start: 2020-08-04 | End: 2021-09-02

## 2021-09-02 RX ORDER — OFLOXACIN OTIC 3 MG/ML
0.3 SOLUTION AURICULAR (OTIC)
Qty: 1 | Refills: 1 | Status: COMPLETED | COMMUNITY
Start: 2020-08-04 | End: 2021-09-02

## 2021-09-02 RX ORDER — OFLOXACIN OTIC 3 MG/ML
0.3 SOLUTION AURICULAR (OTIC) TWICE DAILY
Qty: 1 | Refills: 3 | Status: COMPLETED | COMMUNITY
Start: 2020-02-19 | End: 2021-09-02

## 2021-09-02 RX ORDER — PREDNISONE 10 MG/1
10 TABLET ORAL
Qty: 30 | Refills: 0 | Status: COMPLETED | COMMUNITY
Start: 2020-03-18 | End: 2021-09-02

## 2021-09-02 RX ORDER — AMOXICILLIN AND CLAVULANATE POTASSIUM 875; 125 MG/1; MG/1
875-125 TABLET, COATED ORAL
Qty: 14 | Refills: 0 | Status: COMPLETED | COMMUNITY
Start: 2020-08-13 | End: 2021-09-02

## 2021-09-02 RX ORDER — METHYLPREDNISOLONE 4 MG/1
4 TABLET ORAL
Qty: 1 | Refills: 0 | Status: COMPLETED | COMMUNITY
Start: 2020-08-13 | End: 2021-09-02

## 2021-09-02 RX ORDER — MECLIZINE HYDROCHLORIDE 25 MG/1
25 TABLET ORAL 3 TIMES DAILY
Qty: 30 | Refills: 1 | Status: COMPLETED | COMMUNITY
Start: 2020-07-07 | End: 2021-09-02

## 2021-09-02 RX ORDER — AMOXICILLIN AND CLAVULANATE POTASSIUM 875; 125 MG/1; MG/1
875-125 TABLET, COATED ORAL
Qty: 20 | Refills: 0 | Status: COMPLETED | COMMUNITY
Start: 2020-03-18 | End: 2021-09-02

## 2021-09-02 RX ORDER — METHYLPREDNISOLONE 4 MG/1
4 TABLET ORAL
Qty: 1 | Refills: 0 | Status: COMPLETED | COMMUNITY
Start: 2020-06-26 | End: 2021-09-02

## 2021-09-02 RX ORDER — MECLIZINE HYDROCHLORIDE 25 MG/1
25 TABLET ORAL 3 TIMES DAILY
Qty: 30 | Refills: 1 | Status: COMPLETED | COMMUNITY
Start: 2021-08-11 | End: 2021-09-02

## 2021-09-02 RX ORDER — CIPROFLOXACIN AND DEXAMETHASONE 3; 1 MG/ML; MG/ML
0.3-0.1 SUSPENSION/ DROPS AURICULAR (OTIC) TWICE DAILY
Qty: 1 | Refills: 1 | Status: COMPLETED | COMMUNITY
Start: 2020-06-26 | End: 2021-09-02

## 2021-09-02 RX ORDER — HYDROCODONE BITARTRATE AND ACETAMINOPHEN 5; 325 MG/1; MG/1
5-325 TABLET ORAL
Qty: 12 | Refills: 0 | Status: COMPLETED | COMMUNITY
Start: 2020-08-04 | End: 2021-09-02

## 2021-09-02 RX ORDER — OXYCODONE 5 MG/1
5 TABLET ORAL
Qty: 15 | Refills: 0 | Status: COMPLETED | COMMUNITY
Start: 2020-08-13 | End: 2021-09-02

## 2021-09-02 RX ORDER — IBUPROFEN 800 MG/1
TABLET, FILM COATED ORAL
Refills: 0 | Status: COMPLETED | COMMUNITY
End: 2021-09-02

## 2021-09-02 NOTE — HISTORY OF PRESENT ILLNESS
[de-identified] : 66 year old male presents for follow up for vertigo. States for two months has vertigo when changing positions or moving head up, bilateral tinnitus and fluid in the right ear, and right ear hearing loss. States was taking Meclizine for vertigo had slight improvement but no relief.  Patient denies otalgia, ear infections or headaches related to hearing.\par

## 2021-09-02 NOTE — REASON FOR VISIT
[Subsequent Evaluation] : a subsequent evaluation for [FreeTextEntry2] : follow up for vertigo and dizziness

## 2021-10-06 ENCOUNTER — OUTPATIENT (OUTPATIENT)
Dept: OUTPATIENT SERVICES | Facility: HOSPITAL | Age: 66
LOS: 1 days | End: 2021-10-06
Payer: COMMERCIAL

## 2021-10-06 ENCOUNTER — APPOINTMENT (OUTPATIENT)
Dept: MRI IMAGING | Facility: CLINIC | Age: 66
End: 2021-10-06
Payer: COMMERCIAL

## 2021-10-06 DIAGNOSIS — Z98.84 BARIATRIC SURGERY STATUS: Chronic | ICD-10-CM

## 2021-10-06 DIAGNOSIS — Z98.890 OTHER SPECIFIED POSTPROCEDURAL STATES: Chronic | ICD-10-CM

## 2021-10-06 DIAGNOSIS — Z00.8 ENCOUNTER FOR OTHER GENERAL EXAMINATION: ICD-10-CM

## 2021-10-06 DIAGNOSIS — H70.11 CHRONIC MASTOIDITIS, RIGHT EAR: ICD-10-CM

## 2021-10-06 PROCEDURE — 70553 MRI BRAIN STEM W/O & W/DYE: CPT | Mod: 26

## 2021-10-06 PROCEDURE — 70553 MRI BRAIN STEM W/O & W/DYE: CPT

## 2021-10-06 PROCEDURE — A9585: CPT

## 2021-10-13 ENCOUNTER — APPOINTMENT (OUTPATIENT)
Dept: OTOLARYNGOLOGY | Facility: CLINIC | Age: 66
End: 2021-10-13
Payer: COMMERCIAL

## 2021-10-13 VITALS
HEIGHT: 63 IN | DIASTOLIC BLOOD PRESSURE: 71 MMHG | BODY MASS INDEX: 35.08 KG/M2 | WEIGHT: 198 LBS | SYSTOLIC BLOOD PRESSURE: 126 MMHG | HEART RATE: 61 BPM

## 2021-10-13 PROCEDURE — 92567 TYMPANOMETRY: CPT

## 2021-10-13 PROCEDURE — 92557 COMPREHENSIVE HEARING TEST: CPT

## 2021-10-13 PROCEDURE — 99214 OFFICE O/P EST MOD 30 MIN: CPT | Mod: 25

## 2021-10-13 RX ORDER — PREDNISOLONE ACETATE 10 MG/ML
1 SUSPENSION/ DROPS OPHTHALMIC
Qty: 1 | Refills: 0 | Status: DISCONTINUED | COMMUNITY
Start: 2021-09-02 | End: 2021-10-13

## 2021-10-13 RX ORDER — AMOXICILLIN AND CLAVULANATE POTASSIUM 875; 125 MG/1; MG/1
875-125 TABLET, COATED ORAL
Qty: 20 | Refills: 0 | Status: DISCONTINUED | COMMUNITY
Start: 2021-09-02 | End: 2021-10-13

## 2021-10-13 NOTE — REASON FOR VISIT
[Subsequent Evaluation] : a subsequent evaluation for [FreeTextEntry2] : 66 year male follow-up for MRI for vertigo

## 2021-10-13 NOTE — HISTORY OF PRESENT ILLNESS
[de-identified] : 66 year male follow-up for MRI for vertigo\par MRI Brain and INternal Auditory Canals w/wo Constrast 10/06/2021\par IMPRESSION: Age-appropriate involutional and ischemic gliotic changes. Enhancing tissue in the right middle ear does not restrict on patellar DWI and likely represents granulation tissue rather than a cholesteatoma.\par Last episode of vertigo 3 weeks ago lasting 3 days. Relieved with Meclizine. Complains of intermittent right otalgia. Denies otorrhea, ear infections. Consistent bilateral tinnitus for a couple months. Right ear gradual hearing loss for years, denies hearing aids.\par Patient denies otalgia, ear infections or headaches related to hearing.

## 2021-10-25 NOTE — H&P PST ADULT - MS GEN HX ROS MEA POS PC
Impression: Dry AMD OD Plan: Exam/photos/OCT show drusen without IRF/SRF OD. FA 10/25/21 shows staining without leakage. ICG shows blockage. Cont AREDS/AG; RTC immediately prn dec VA, inc Sxs.
Impression: LIZET Plan: Cont ATs for comfort.
Impression: Wet AMD OS Plan: Exam/photos/OCT show CNVM without recurrent IRF/SRF OS s/p Lucentis last visit. FA 10/25/21 shows staining without leakage. ICG shows staining. Recommend q12 week maint Lincoln OS today to prevent VA loss. 

12 weeks, DFE/OCT OU, Lucentis OS
joint pain/joint swelling/back pain/bilateral knees

## 2022-01-28 ENCOUNTER — APPOINTMENT (OUTPATIENT)
Dept: OTOLARYNGOLOGY | Facility: CLINIC | Age: 67
End: 2022-01-28
Payer: COMMERCIAL

## 2022-01-28 PROCEDURE — 92557 COMPREHENSIVE HEARING TEST: CPT

## 2022-01-28 PROCEDURE — 92567 TYMPANOMETRY: CPT

## 2022-01-28 PROCEDURE — 95992 CANALITH REPOSITIONING PROC: CPT

## 2022-01-28 PROCEDURE — 99214 OFFICE O/P EST MOD 30 MIN: CPT | Mod: 25

## 2022-01-28 NOTE — PHYSICAL EXAM
[Normal] : the left tympanic membrane was normal [Ronan-Jean-Claudeke] : [place]-Hallpike: Positive [FreeTextEntry8] : canal appears swollen [de-identified] : subanular tube in place [de-identified] : Ronan hallpike positive on the right\par Epley maneuver performed

## 2022-01-28 NOTE — DATA REVIEWED
[de-identified] : I personally reviewed and interpreted the patient's audiogram for the patient's abnormal auditory perception, which shows\par Left: hearing WNL 250Hz-2kHz followed by a mild SNHL\par Right: moderately severe to mild to severe conductive hearing loss 250Hz, 1kHz, 2kHz, mixed at 500Hz and 2kHz

## 2022-01-28 NOTE — PROCEDURE
[Vertigo] : Vertigo [] : Epley Maneuver [FreeTextEntry3] : Procedure note:  Right cerumenectomy\par \par Description of Procedure:  Cerumen impaction was noted requiring debridement under the operating microscope using otologic instrumentation.  The patient tolerated the procedure without complications.\par \par Epley maneuver\par \par While sitting up, the patient´s head was turned about 45 degrees to the right side. The patient was quickly laid down backwards with their head hanging over the edge of the examining table. The head was kept in this position for 30 seconds and then turned 90 degrees to the opposite side. After another 30 seconds, the head and the body were turned together in the same direction so that the head was pointing down toward the ground at a 45 degree angle. After 30 seconds in this position, the patient was brought upright again. \par

## 2022-01-28 NOTE — HISTORY OF PRESENT ILLNESS
[de-identified] : 65 y/o M presents for f/u for right chronic ear disease\par patient reports vertigo daily before going to bed and first thing in the morning that started a month ago\par reports taking meclizine; used to be effective but is no longer effective\par last episode of vertigo was this morning\par vertigo episodes last about an hour\par Denies otalgia, otorrhea, ear infections.\par Reports tinnitus bilaterally daily especially before bed that also started one month ago\par Denies headaches but reports a feeling of pressure in both ears\par Denies use of hearing aids

## 2022-01-31 ENCOUNTER — APPOINTMENT (OUTPATIENT)
Dept: PHARMACY | Facility: CLINIC | Age: 67
End: 2022-01-31

## 2022-03-11 ENCOUNTER — APPOINTMENT (OUTPATIENT)
Dept: OTOLARYNGOLOGY | Facility: CLINIC | Age: 67
End: 2022-03-11
Payer: COMMERCIAL

## 2022-03-11 VITALS
DIASTOLIC BLOOD PRESSURE: 67 MMHG | BODY MASS INDEX: 35.44 KG/M2 | WEIGHT: 200 LBS | SYSTOLIC BLOOD PRESSURE: 111 MMHG | HEIGHT: 63 IN | HEART RATE: 75 BPM

## 2022-03-11 DIAGNOSIS — H93.11 TINNITUS, RIGHT EAR: ICD-10-CM

## 2022-03-11 PROCEDURE — 99213 OFFICE O/P EST LOW 20 MIN: CPT | Mod: 25

## 2022-03-11 PROCEDURE — 69210 REMOVE IMPACTED EAR WAX UNI: CPT

## 2022-03-11 NOTE — PROCEDURE
[FreeTextEntry3] : Procedure note:  Left cerumenectomy\par \par Mar 11, 2022 \par \par Description of Procedure:  Cerumen impaction was noted requiring debridement under the operating microscope using otologic instrumentation.  The patient tolerated the procedure without complications.\par \par

## 2022-03-11 NOTE — HISTORY OF PRESENT ILLNESS
[de-identified] : 67 y/o M presents for f/u\par patient reports improvement in tinnitus and vertigo\par patient reports vertigo has resolved after vestibular therapy and that he only experiences occasional tinnitus\par reports wearing hearing aid in the past for tinnitus but no longer wears it\par Patient denies otalgia, otorrhea, ear infections, dizziness, vertigo, headaches related to hearing.

## 2022-09-15 ENCOUNTER — APPOINTMENT (OUTPATIENT)
Dept: OTOLARYNGOLOGY | Facility: CLINIC | Age: 67
End: 2022-09-15

## 2022-09-15 VITALS
HEIGHT: 63 IN | WEIGHT: 200 LBS | DIASTOLIC BLOOD PRESSURE: 79 MMHG | BODY MASS INDEX: 35.44 KG/M2 | SYSTOLIC BLOOD PRESSURE: 130 MMHG | HEART RATE: 62 BPM

## 2022-09-15 PROCEDURE — 92504 EAR MICROSCOPY EXAMINATION: CPT

## 2022-09-15 PROCEDURE — 99214 OFFICE O/P EST MOD 30 MIN: CPT | Mod: 25

## 2022-09-15 NOTE — ASSESSMENT
[FreeTextEntry1] : Patient presents for follow-up for right-sided ear drainage with fluid sensation.  Exam today shows purulent debris in ear canal coming from patent subannular tube.  Remaining right tympanic membrane reconstruction appears intact without effusion or retraction.  Rx Augmentin/Floxin drops, follow-up 3 to 4 weeks for reassessment, maintain dry ear precautions until that time.

## 2022-09-15 NOTE — PROCEDURE
[FreeTextEntry3] : Procedure note:  Binocular microscopy\par \par Sep 15, 2022 \par \par Inspection of the ears was performed under binocular microscopy because of need to accurately visualize otologic landmarks and potential pathologic conditions that would not otherwise be visible through simple otoscopic exam.\par

## 2022-10-03 RX ORDER — AMOXICILLIN AND CLAVULANATE POTASSIUM 875; 125 MG/1; MG/1
875-125 TABLET, COATED ORAL
Qty: 20 | Refills: 0 | Status: DISCONTINUED | COMMUNITY
Start: 2022-01-28 | End: 2022-10-03

## 2022-10-03 RX ORDER — AMOXICILLIN AND CLAVULANATE POTASSIUM 875; 125 MG/1; MG/1
875-125 TABLET, COATED ORAL
Qty: 20 | Refills: 0 | Status: DISCONTINUED | COMMUNITY
Start: 2022-09-15 | End: 2022-10-03

## 2022-10-03 RX ORDER — OFLOXACIN OTIC 3 MG/ML
0.3 SOLUTION AURICULAR (OTIC) TWICE DAILY
Qty: 1 | Refills: 0 | Status: DISCONTINUED | COMMUNITY
Start: 2022-01-28 | End: 2022-10-03

## 2022-10-03 RX ORDER — OFLOXACIN OTIC 3 MG/ML
0.3 SOLUTION AURICULAR (OTIC)
Qty: 1 | Refills: 1 | Status: DISCONTINUED | COMMUNITY
Start: 2021-09-02 | End: 2022-10-03

## 2022-10-04 ENCOUNTER — APPOINTMENT (OUTPATIENT)
Dept: NEUROSURGERY | Facility: CLINIC | Age: 67
End: 2022-10-04

## 2022-10-04 VITALS
TEMPERATURE: 98.4 F | BODY MASS INDEX: 35.44 KG/M2 | OXYGEN SATURATION: 96 % | DIASTOLIC BLOOD PRESSURE: 75 MMHG | HEIGHT: 63 IN | SYSTOLIC BLOOD PRESSURE: 145 MMHG | WEIGHT: 200 LBS | HEART RATE: 63 BPM

## 2022-10-04 DIAGNOSIS — G89.29 OTHER CHRONIC PAIN: ICD-10-CM

## 2022-10-04 PROCEDURE — 99203 OFFICE O/P NEW LOW 30 MIN: CPT

## 2022-10-05 ENCOUNTER — APPOINTMENT (OUTPATIENT)
Dept: OTOLARYNGOLOGY | Facility: CLINIC | Age: 67
End: 2022-10-05

## 2022-10-05 DIAGNOSIS — R42 DIZZINESS AND GIDDINESS: ICD-10-CM

## 2022-10-05 PROCEDURE — 92504 EAR MICROSCOPY EXAMINATION: CPT

## 2022-10-05 PROCEDURE — 99213 OFFICE O/P EST LOW 20 MIN: CPT | Mod: 25

## 2022-10-06 NOTE — ASSESSMENT
[FreeTextEntry1] : Patient presents for follow-up for right ear drainage.  Reports resolution of right ear drainage following antibiotics and drops.  Did report recent episode of vertigo when leaning backwards.  Otoscopic exam today shows dry right subannular tube, intact right tympanic membrane reconstruction without new retraction or cholesteatoma.  Left tympanic membrane appears normal.  [place]-Hallpike negative for nystagmus.\par \par Monitor vertigo for recurrence, patient has history of BPV but not evident on exam today.  Maintain dry ear precautions right ear.  Follow-up 6 months or sooner with any change in ear symptoms.

## 2022-10-15 NOTE — REASON FOR VISIT
[New Patient Visit] : a new patient visit [Referred By: _________] : Patient was referred by MARIJA [FreeTextEntry1] : low back pain

## 2022-10-15 NOTE — RESULTS/DATA
[FreeTextEntry1] : Lumbar MRI (12/29/2021) L4-L5 annular fissure and central disc herniation compressing L4 nerve root. L5-S1 disc bulge. \par

## 2022-10-15 NOTE — ASSESSMENT
[FreeTextEntry1] : Mr. Archer's chronic low back pain is constant and severe. Now he complains of lumbar radiculopathy. His physical examination is normal. Physical therapy has not helped him in the past. At this point his best option is pain management. He will also get lumbar xrays.\par \par \par \par \par

## 2022-10-15 NOTE — HISTORY OF PRESENT ILLNESS
[> 3 months] : more  than 3 months [FreeTextEntry1] : Low back pain.  [de-identified] : Mr. Archer is a 67 year old male with past medical history of vertigo, cholesteatoma of right ear, who is referred by Dr James Huber (pain management) due to low back pain that started over a year ago and it progressively has become worse. He attributes that his low back might be caused to a fall in the past. The low back pain is 9/10 and is constant.  Sitting and standing for long periods of time triggers the low back pain. He takes tramadol 50 mg daily for pain. The low back pain radiates to bilateral anterior aspect of thighs. This is associated with numbness of both thighs and tingling of the sole of his feet. He denies weakness, bladder or bowel dysfunction. Physical therapy has not helped him. His last session was on 03/2022. He has received total of five injections in the lower back, last one was one month ago. Each of the injections helped him for 4-5 days. He is able to walk without difficulty if he takes tramadol. But he is not able to walk more than 2-3 blocks if the does not take it due to bilateral thigh numbness.       \par

## 2022-10-15 NOTE — PHYSICAL EXAM
[General Appearance - Alert] : alert [General Appearance - Well Nourished] : well nourished [Oriented To Time, Place, And Person] : oriented to person, place, and time [Full ROM] : full ROM [Normal] : normal [Able to toe walk] : the patient was able to toe walk [Able to heel walk] : the patient was able to heel walk [Intact] : all reflexes within normal limits bilaterally [Person] : oriented to person [Place] : oriented to place [Time] : oriented to time [Motor Tone] : muscle tone was normal in all four extremities [Motor Strength] : muscle strength was normal in all four extremities [Involuntary Movements] : no involuntary movements were seen [5] : S1 toe walking 5/5 [Abnormal Walk] : normal gait [Balance] : balance was intact [Tremor] : no tremor present [Straight-Leg Raise Test - Left] : straight leg raise of the left leg was negative [Straight-Leg Raise Test - Right] : straight leg raise  of the right leg was negative

## 2022-10-25 ENCOUNTER — APPOINTMENT (OUTPATIENT)
Dept: NEUROSURGERY | Facility: CLINIC | Age: 67
End: 2022-10-25

## 2022-10-25 VITALS
BODY MASS INDEX: 35.44 KG/M2 | OXYGEN SATURATION: 95 % | TEMPERATURE: 98.4 F | SYSTOLIC BLOOD PRESSURE: 143 MMHG | HEART RATE: 77 BPM | DIASTOLIC BLOOD PRESSURE: 83 MMHG | WEIGHT: 200 LBS | HEIGHT: 63 IN

## 2022-10-25 PROCEDURE — 99213 OFFICE O/P EST LOW 20 MIN: CPT

## 2022-10-26 NOTE — PHYSICAL EXAM
[General Appearance - Alert] : alert [General Appearance - Well Nourished] : well nourished [Oriented To Time, Place, And Person] : oriented to person, place, and time [Full ROM] : full ROM [Normal] : normal [Able to toe walk] : the patient was able to toe walk [Able to heel walk] : the patient was able to heel walk [Intact] : all reflexes within normal limits bilaterally [Straight-Leg Raise Test - Left] : straight leg raise of the left leg was negative [Straight-Leg Raise Test - Right] : straight leg raise  of the right leg was negative

## 2022-10-26 NOTE — HISTORY OF PRESENT ILLNESS
[> 3 months] : more  than 3 months [FreeTextEntry1] : Low back pain.  [de-identified] : 10/25/22: Mr. Archer is a 68 y/o, male, here for f/u to review xray L spine. He reports since last office visit his low back radiating to b/l LE remains the same. He rates the pain an 8/10. His pain is worsened by lying or sitting long periods. The pain in his legs is his most bothersome symptom. His pain improves by walking. He has numbness of b/l thighs, and a burning sensation on his feet and toes. He has been taking tramadol for his symptoms. He denies urine/fecal incontinence.He is scheduled to see Dr. Huber next week. \par \par 10/22: Mr. Archer is a 67 year old male with past medical history of vertigo, cholesteatoma of right ear, who is referred by Dr James Huber (pain management) due to low back pain that started over a year ago and it progressively has become worse. He attributes that his low back might be caused to a fall in the past. The low back pain is 9/10 and is constant.  Sitting and standing for long periods of time triggers the low back pain. He takes tramadol 50 mg daily for pain. The low back pain radiates to bilateral anterior aspect of thighs. This is associated with numbness of both thighs and tingling of the sole of his feet. He denies weakness, bladder or bowel dysfunction. Physical therapy has not helped him. His last session was on 03/2022. He has received total of five injections in the lower back, last one was one month ago. Each of the injections helped him for 4-5 days. He is able to walk without difficulty if he takes tramadol. But he is not able to walk more than 2-3 blocks if the does not take it due to bilateral thigh numbness.       \par

## 2022-10-26 NOTE — RESULTS/DATA
[FreeTextEntry1] : \par IMPRESSION: Moderate degenerative disc disease at L4-5, mild throughout the remainder of the lumbar spine with multilevel spondylosis and hypertrophic facet joint degeneration. Mild levoconvex lumbar spinal curvature. Bilateral sacroiliac joint degeneration.\par

## 2022-10-26 NOTE — REVIEW OF SYSTEMS
[As Noted in HPI] : as noted in HPI [Numbness] : numbness [Tingling] : tingling [Abnormal Sensation] : an abnormal sensation [Negative] : Heme/Lymph [de-identified] : low back pain

## 2022-10-26 NOTE — ASSESSMENT
[FreeTextEntry1] : Mr. Morris is a 68 y/o, male, with DDD of lumbar spine with L4-L5 herniated disc and neural foraminal stenosis and central stenosis with neurogenic claudication.At this point he has failed conservative treatment with physical therapy, and multiple injections to spine by PM Dr. Huber. Discussed wide lumbar laminectomy for decompression and  lumbar fusion surgery for stabilization  in 01/23. Risks and benefits discussed. New MRI prior to surgery needed for planning and to f/u on stenosis.  \par \par \par \par \par

## 2023-01-19 NOTE — ED ADULT TRIAGE NOTE - RELATIONSHIP TO PATIENT
Patient was seen and personally evaluated with Lisa DOMÍNGUEZ during this visit.     Multiple medical problems with h/o dizziness since 2018. She gets pressure in her head and feels 'floaty' lasting a few seconds and then it goes away. Only when standing. Sitting to standing or fast turns for a second.   Never supine or sitting. She has it 4x a week but has fallen. She has check her sugars - no problems.   She may get tunnel vision, but no darkness.   No headache reported, no nausea or chest pain.   HUT was unremarkable.    Patient has undergo extensive assessment with Neurology, Cardiology and ENT.    On examination the patient is noted to have end gaze nystagmus, however the nystagmus is more prominent to the left side.  She has a positive head thrust maneuver and [place]-Hallpike maneuver there is no nystagmus elicited, when sitting up is more evident the patient has nystagmus to the left side on in days, this is not so prominent control laterally.      Patient has decreased nasolabial fold on the left side as well relative hyperreflexia but no weakness or sensory changes.    His CT of the head performed 09/08/2022 showed no acute intracranial abnormality.  CT angiogram of the head and neck show 35% stenosis of proximal R ICA and L ICA with 30% percent stenosis.     MRI brain 4/6/21 showed no intracranial abnormalities.  The MRI of the cervical spine performed at the same time showed mild degenerative changes without significant spinal canal or neuroforaminal stenosis.    Discussed with patient the uncertain nature of the episodes in question.  Episodes appear to be triggered by changes in position such as sitting to standing by motion also triggers the feeling.  On examination were able to see a slight hyperreflexia on the left side left-sided nystagmus which is more prominent is also appreciated.      Assessment with the VNG might be helpful to assess if the might be a component of a vestibulopathy the might be  able to explain the patient's symptomatology as cerebral hypoperfusion has not been demonstrated to be a consistent finding in association to her symptoms.    Will be happy to review results after her VNG is performed.      Alec Courtney MD      Total time spent with patient 30 min     Cc Kathleen Quintero DO     aura daughter

## 2023-02-23 ENCOUNTER — APPOINTMENT (OUTPATIENT)
Dept: NEUROSURGERY | Facility: CLINIC | Age: 68
End: 2023-02-23
Payer: COMMERCIAL

## 2023-02-23 VITALS
OXYGEN SATURATION: 92 % | HEIGHT: 63 IN | TEMPERATURE: 97.3 F | HEART RATE: 71 BPM | SYSTOLIC BLOOD PRESSURE: 141 MMHG | BODY MASS INDEX: 38.09 KG/M2 | DIASTOLIC BLOOD PRESSURE: 78 MMHG | WEIGHT: 215 LBS

## 2023-02-23 PROCEDURE — 99213 OFFICE O/P EST LOW 20 MIN: CPT

## 2023-02-27 NOTE — REVIEW OF SYSTEMS
[As Noted in HPI] : as noted in HPI [Numbness] : numbness [Tingling] : tingling [Abnormal Sensation] : an abnormal sensation [Negative] : Heme/Lymph [de-identified] : low back pain

## 2023-02-27 NOTE — ASSESSMENT
[FreeTextEntry1] : Mr. Morris is a 66 y/o, male, with DDD of lumbar spine with L4-L5 herniated disc and neural foraminal stenosis and central stenosis with neurogenic claudication. Patient had failed conservative treatment with physical therapy, and multiple injections to spine by PM Dr. Huber. We discussed wide lumbar laminectomy for decompression and lumbar fusion (L4/5 TLIF) in great details. Risks and benefits discussed. Updated MRI completed on 12/9/22. His pain is persistent on the left anterior aspect of the L thigh. He would like to proceed with surgery in April or May. \par \par \par \par \par \par \par

## 2023-02-27 NOTE — HISTORY OF PRESENT ILLNESS
[> 3 months] : more  than 3 months [FreeTextEntry1] : 02/22/23: Mr. Archer is a 66 y/o, male with low back radiating to b/l LEs. His pain is worsened by lying or sitting long periods. The pain in his legs is his most bothersome symptom. His pain improves by walking. He has numbness of b/l thighs, and a burning sensation on his feet and toes. He has been taking tramadol for his symptoms. He denies urine/fecal incontinence.\par \par \par 10/25/22: Mr. Archer is a 66 y/o, male, here for f/u to review xray L spine. He reports since last office visit his low back radiating to b/l LE remains the same. He rates the pain an 8/10. His pain is worsened by lying or sitting long periods. The pain in his legs is his most bothersome symptom. His pain improves by walking. He has numbness of b/l thighs, and a burning sensation on his feet and toes. He has been taking tramadol for his symptoms. He denies urine/fecal incontinence.He is scheduled to see Dr. Huber next week. \par \par 10/22: Mr. Archer is a 67 year old male with past medical history of vertigo, cholesteatoma of right ear, who is referred by Dr James Huber (pain management) due to low back pain that started over a year ago and it progressively has become worse. He attributes that his low back might be caused to a fall in the past. The low back pain is 9/10 and is constant.  Sitting and standing for long periods of time triggers the low back pain. He takes tramadol 50 mg daily for pain. The low back pain radiates to bilateral anterior aspect of thighs. This is associated with numbness of both thighs and tingling of the sole of his feet. He denies weakness, bladder or bowel dysfunction. Physical therapy has not helped him. His last session was on 03/2022. He has received total of five injections in the lower back, last one was one month ago. Each of the injections helped him for 4-5 days. He is able to walk without difficulty if he takes tramadol. But he is not able to walk more than 2-3 blocks if the does not take it due to bilateral thigh numbness.       \par

## 2023-03-26 NOTE — DISCHARGE NOTE ADULT - MEDICATION SUMMARY - MEDICATIONS TO CHANGE
Patient called regarding SOB, reports waking up gasping for breath. She noticed this after starting corlanor. He reported visual changes, dizziness and SOB about 10 days ago and Dr. Davi Garrido told her to wean off inderal. She has decreased her dose to daily but has extended release tabs. Instructed her since she has already taken meds for today. Call office in am so nurse can discuss with Dr. Davi Garrido. If sx worse to ED. I will SWITCH the dose or number of times a day I take the medications listed below when I get home from the hospital:  None

## 2023-04-05 ENCOUNTER — OUTPATIENT (OUTPATIENT)
Dept: OUTPATIENT SERVICES | Facility: HOSPITAL | Age: 68
LOS: 1 days | End: 2023-04-05
Payer: COMMERCIAL

## 2023-04-05 VITALS
SYSTOLIC BLOOD PRESSURE: 135 MMHG | HEIGHT: 63 IN | DIASTOLIC BLOOD PRESSURE: 76 MMHG | RESPIRATION RATE: 15 BRPM | OXYGEN SATURATION: 99 % | WEIGHT: 210.1 LBS | TEMPERATURE: 98 F | HEART RATE: 69 BPM

## 2023-04-05 DIAGNOSIS — M51.26 OTHER INTERVERTEBRAL DISC DISPLACEMENT, LUMBAR REGION: ICD-10-CM

## 2023-04-05 DIAGNOSIS — Z98.890 OTHER SPECIFIED POSTPROCEDURAL STATES: Chronic | ICD-10-CM

## 2023-04-05 DIAGNOSIS — M54.16 RADICULOPATHY, LUMBAR REGION: ICD-10-CM

## 2023-04-05 DIAGNOSIS — M54.9 DORSALGIA, UNSPECIFIED: ICD-10-CM

## 2023-04-05 DIAGNOSIS — Z01.818 ENCOUNTER FOR OTHER PREPROCEDURAL EXAMINATION: ICD-10-CM

## 2023-04-05 DIAGNOSIS — I10 ESSENTIAL (PRIMARY) HYPERTENSION: ICD-10-CM

## 2023-04-05 DIAGNOSIS — Z98.84 BARIATRIC SURGERY STATUS: Chronic | ICD-10-CM

## 2023-04-05 DIAGNOSIS — G47.33 OBSTRUCTIVE SLEEP APNEA (ADULT) (PEDIATRIC): ICD-10-CM

## 2023-04-05 LAB
A1C WITH ESTIMATED AVERAGE GLUCOSE RESULT: 6.3 % — HIGH (ref 4–5.6)
BLD GP AB SCN SERPL QL: SIGNIFICANT CHANGE UP
ESTIMATED AVERAGE GLUCOSE: 134 MG/DL — HIGH (ref 68–114)

## 2023-04-05 PROCEDURE — G0463: CPT

## 2023-04-05 RX ORDER — MECLIZINE HCL 12.5 MG
0 TABLET ORAL
Qty: 0 | Refills: 0 | DISCHARGE

## 2023-04-05 RX ORDER — CHLORHEXIDINE GLUCONATE 213 G/1000ML
1 SOLUTION TOPICAL
Refills: 0 | Status: DISCONTINUED | OUTPATIENT
Start: 2023-04-10 | End: 2023-04-10

## 2023-04-05 RX ORDER — SODIUM CHLORIDE 9 MG/ML
3 INJECTION INTRAMUSCULAR; INTRAVENOUS; SUBCUTANEOUS EVERY 8 HOURS
Refills: 0 | Status: DISCONTINUED | OUTPATIENT
Start: 2023-04-10 | End: 2023-04-10

## 2023-04-05 RX ORDER — IBUPROFEN 200 MG
1 TABLET ORAL
Qty: 0 | Refills: 0 | DISCHARGE

## 2023-04-05 NOTE — H&P PST ADULT - PROBLEM SELECTOR PLAN 1
Pt schedule for L4-L5 Posterior Lumbar Laminectomy and Fusion with Instrumentation on 4/10/23 with Dr. Rivas    Labs and EKG done by PCP 4/4/23- will f/u result   Pt was seen by PCP for Medical clearance 4/4/23- will f/u report   Pt was also seen by Cardiologist- Clearance in chart.   Covid test schedule for 4/8/23 per pt- will f/u result      Pt was  instructed to continue aspirin and to stop all other over the counter medication including vitamins and herbal supplements one week prior to surgery   Instructions given on the use of 4% chlorhexidine wash and Pt verbalized understanding of same   Pt Instructed to have nothing by mouth starting midnight day before surgery  Patient is to expect a phone call day before surgery between the hours of 430- 630pm giving arrival time for surgery   Written and verbal preoperative instructions given to patient with understanding verbalized.   Pt Instructed on use of Mupirocin if nasal swab positive      Patient today with STOP bang score 5  Moderate  risk for HAMIDA Pt schedule for L4-L5 Posterior Lumbar Laminectomy and Fusion with Instrumentation on 4/10/23 with Dr. Rivas    Labs and EKG done by PCP 4/4/23- will f/u result   Pt was seen by PCP for Medical clearance 4/4/23- will f/u report   Pt was also seen by Cardiologist- Cardiac w/u report in chart   Covid test schedule for 4/8/23 per pt- will f/u result      Pt was  instructed to continue aspirin and to stop all other over the counter medication including vitamins and herbal supplements one week prior to surgery   Instructions given on the use of 4% chlorhexidine wash and Pt verbalized understanding of same   Pt Instructed to have nothing by mouth starting midnight day before surgery  Patient is to expect a phone call day before surgery between the hours of 430- 630pm giving arrival time for surgery   Written and verbal preoperative instructions given to patient with understanding verbalized.   Pt Instructed on use of Mupirocin if nasal swab positive      Patient today with STOP bang score 5  Moderate  risk for HAMIDA Pt schedule for L4-L5 Posterior Lumbar Laminectomy and Fusion with Instrumentation on 4/10/23 with Dr. Rivas    Labs and EKG done by PCP 4/4/23- will f/u result   Pt was seen by PCP for Medical clearance 4/4/23- will f/u report   Pt was also seen by Cardiologist- Cardiac w/u report in chart   Covid test schedule for 4/8/23 per pt- will f/u result      Pt was  instructed to continue aspirin and to stop all other over the counter medication including vitamins and herbal supplements one week prior to surgery   Instructions given on the use of 4% chlorhexidine wash and Pt verbalized understanding of same   Pt Instructed to have nothing by mouth starting midnight day before surgery  Patient is to expect a phone call day before surgery between the hours of 430- 630pm giving arrival time for surgery   Written and verbal preoperative instructions given to patient with understanding verbalized.   Pt Instructed on use of Mupirocin if nasal swab positive      Confirm HAMIDA- CPAP recommended - Stop using CPAP 2015 after Bariatric surgery

## 2023-04-05 NOTE — H&P PST ADULT - NSICDXPASTMEDICALHX_GEN_ALL_CORE_FT
PAST MEDICAL HISTORY:  Bilateral otitis media     Chronic mastoiditis right ear    DVT (deep venous thrombosis) LLE in 2019 treated with eliquis for 6 months    HLD (hyperlipidemia)     HLD (hyperlipidemia)     HTN (hypertension)     HTN (hypertension)     Lumbar herniated disc     Migraine headache     Obstructive sleep apnea on C PAP no longer using CPAP machine    Otalgia, right ear     Poor historian     Prediabetes pt states A1c is 6. denies current use of meds    Renal cyst, left     Unspecified cholesteatoma, right ear     Vertigo

## 2023-04-05 NOTE — H&P PST ADULT - NSICDXPASTSURGICALHX_GEN_ALL_CORE_FT
PAST SURGICAL HISTORY:  H/O prostate biopsy     History of umbilical hernia repair x2    History of weight loss surgery 5 years ago in [place]    S/P shoulder surgery left shoulder    S/P UPPP (uvulopalatopharyngoplasty) >5 years ago

## 2023-04-05 NOTE — H&P PST ADULT - RX
Discussed case with my supervising physician. He recommends against us prescribing it right now. I am in agreement, due to the fact that estrogen is an important hormone for men as well, so long as it is not causing side effects such as nipple tenderness or gynecomastia, etc. I would be willing to refer patient to urology or endocrine for further evaluation.
Please review, also see most recent patient message under encounters
Spoke with the patient per Sedrick's note. Patient agreed and voiced understanding. Patient stated he would like to proceed with a referral to an endocrinologist, please advise.
Confirm HAMIDA- CPAP recommended - Stop using CPAP 2015 after Bariatric surgery

## 2023-04-05 NOTE — H&P PST ADULT - NSANTHOSAYNRD_GEN_A_CORE
No. HAMIDA screening performed.  STOP BANG Legend: 0-2 = LOW Risk; 3-4 = INTERMEDIATE Risk; 5-8 = HIGH Risk Yes

## 2023-04-05 NOTE — H&P PST ADULT - ASSESSMENT
67 y.o male with Intervertebral Disc Displacement and Now  for schedule   L4-L5 Posterior Lumbar Laminectomy and Fusion with Instrumentation on 4/10/23 with Dr. Rob GODFREY 5 67 y.o male with Intervertebral Disc Displacement and Now  for schedule   L4-L5 Posterior Lumbar Laminectomy and Fusion with Instrumentation on 4/10/23 with Dr. Rivas    Confirm HAMIDA- CPAP recommended - Stop using CPAP 2015 after Bariatric surgery

## 2023-04-05 NOTE — H&P PST ADULT - MUSCULOSKELETAL
normal/ROM intact/normal gait/strength 5/5 bilateral upper extremities/strength 5/5 bilateral lower extremities details… ROM intact/normal gait

## 2023-04-05 NOTE — H&P PST ADULT - HISTORY OF PRESENT ILLNESS
s/p fall 2018, left shoulder surgery, Back pain, w/u   bariatric surgery and Hernia Repair in [place] [id number] 67 y.o male with PMHx for HLD, HTN, Bariatric surgery and Umbilical Hernia Repair in [place] 2015,  s/p Fall (off Ladder) in 2018 -resulting in torn Rotator cuff, s/p Left Rotator Cuff repair 2018, and  Intervertebral Disc Displacement and Chronic Back pain. Now Presents to presurgical testing for schedule   L4-L5 Posterior Lumbar Laminectomy and Fusion with Instrumentation on 4/10/23 with Dr. Rivas   67 y.o male with PMHx for HLD, HTN, Bariatric surgery and Umbilical Hernia Repair in [place] 2015,  Confirm HAMIDA- CPAP recommended - Stop using CPAP 2015 after Bariatric surgery.  s/p Fall (off Ladder) in 2018 -resulting in torn Rotator cuff, s/p Left Rotator Cuff repair 2018, and  Intervertebral Disc Displacement and Chronic Back pain. Now Presents to presurgical testing for schedule   L4-L5 Posterior Lumbar Laminectomy and Fusion with Instrumentation on 4/10/23 with Dr. Rivas

## 2023-04-05 NOTE — H&P PST ADULT - NSICDXPROCEDURE_GEN_ALL_CORE_FT
PROCEDURES:  Laminectomy, spine, posterior or posterolateral approach, with lumbar spine fusion 05-Apr-2023 14:10:35  Yousif Red  Posterior non-segmental spinal instrumentation 05-Apr-2023 14:11:02  Yousif Red  Endoscopic implantation of prosthetic intervertebral disc 05-Apr-2023 14:11:43  Yousif Red

## 2023-04-05 NOTE — H&P PST ADULT - PROBLEM SELECTOR PLAN 2
Pt instructed to take antihypertensive amlodipine  and aspirin as prescribed including taking on morning of surgery with small sip of water

## 2023-04-06 LAB
MRSA PCR RESULT.: SIGNIFICANT CHANGE UP
S AUREUS DNA NOSE QL NAA+PROBE: DETECTED

## 2023-04-07 RX ORDER — MUPIROCIN 20 MG/G
1 OINTMENT TOPICAL
Qty: 16 | Refills: 0
Start: 2023-04-07 | End: 2023-04-11

## 2023-04-07 RX ORDER — POVIDONE-IODINE 5 %
1 AEROSOL (ML) TOPICAL ONCE
Refills: 0 | Status: COMPLETED | OUTPATIENT
Start: 2023-04-10 | End: 2023-04-10

## 2023-04-09 ENCOUNTER — TRANSCRIPTION ENCOUNTER (OUTPATIENT)
Age: 68
End: 2023-04-09

## 2023-04-10 ENCOUNTER — TRANSCRIPTION ENCOUNTER (OUTPATIENT)
Age: 68
End: 2023-04-10

## 2023-04-10 ENCOUNTER — APPOINTMENT (OUTPATIENT)
Dept: NEUROSURGERY | Facility: HOSPITAL | Age: 68
End: 2023-04-10

## 2023-04-10 ENCOUNTER — INPATIENT (INPATIENT)
Facility: HOSPITAL | Age: 68
LOS: 6 days | Discharge: HOME | DRG: 460 | End: 2023-04-17
Attending: NEUROLOGICAL SURGERY | Admitting: NEUROLOGICAL SURGERY
Payer: MEDICARE

## 2023-04-10 VITALS
RESPIRATION RATE: 16 BRPM | DIASTOLIC BLOOD PRESSURE: 78 MMHG | TEMPERATURE: 98 F | OXYGEN SATURATION: 95 % | HEIGHT: 63 IN | HEART RATE: 68 BPM | WEIGHT: 210.1 LBS | SYSTOLIC BLOOD PRESSURE: 143 MMHG

## 2023-04-10 DIAGNOSIS — Z98.890 OTHER SPECIFIED POSTPROCEDURAL STATES: Chronic | ICD-10-CM

## 2023-04-10 DIAGNOSIS — Z98.84 BARIATRIC SURGERY STATUS: Chronic | ICD-10-CM

## 2023-04-10 DIAGNOSIS — M54.9 DORSALGIA, UNSPECIFIED: ICD-10-CM

## 2023-04-10 LAB
BLD GP AB SCN SERPL QL: SIGNIFICANT CHANGE UP
GLUCOSE BLDC GLUCOMTR-MCNC: 112 MG/DL — HIGH (ref 70–99)
GLUCOSE BLDC GLUCOMTR-MCNC: 92 MG/DL — SIGNIFICANT CHANGE UP (ref 70–99)
SARS-COV-2 RNA SPEC QL NAA+PROBE: SIGNIFICANT CHANGE UP

## 2023-04-10 PROCEDURE — 22630 ARTHRD PST TQ 1NTRSPC LUM: CPT

## 2023-04-10 PROCEDURE — 22840 INSERT SPINE FIXATION DEVICE: CPT

## 2023-04-10 PROCEDURE — 22853 INSJ BIOMECHANICAL DEVICE: CPT | Mod: AS

## 2023-04-10 PROCEDURE — 22853 INSJ BIOMECHANICAL DEVICE: CPT

## 2023-04-10 PROCEDURE — 22630 ARTHRD PST TQ 1NTRSPC LUM: CPT | Mod: AS

## 2023-04-10 PROCEDURE — 63052 LAM FACETC/FRMT ARTHRD LUM 1: CPT

## 2023-04-10 PROCEDURE — 22840 INSERT SPINE FIXATION DEVICE: CPT | Mod: AS

## 2023-04-10 PROCEDURE — 63052 LAM FACETC/FRMT ARTHRD LUM 1: CPT | Mod: AS

## 2023-04-10 DEVICE — SCREW EVEREST 6.5X45MM: Type: IMPLANTABLE DEVICE | Status: FUNCTIONAL

## 2023-04-10 DEVICE — IMPLANTABLE DEVICE: Type: IMPLANTABLE DEVICE | Status: FUNCTIONAL

## 2023-04-10 DEVICE — SET SCREW EVEREST ATR: Type: IMPLANTABLE DEVICE | Status: FUNCTIONAL

## 2023-04-10 DEVICE — ROD BULLET HEX CONTOUR 40MM: Type: IMPLANTABLE DEVICE | Status: FUNCTIONAL

## 2023-04-10 DEVICE — PACK GWIRE NITINOL 1.4X495MM 2/PK: Type: IMPLANTABLE DEVICE | Status: FUNCTIONAL

## 2023-04-10 RX ORDER — OXYCODONE HYDROCHLORIDE 5 MG/1
5 TABLET ORAL EVERY 4 HOURS
Refills: 0 | Status: DISCONTINUED | OUTPATIENT
Start: 2023-04-10 | End: 2023-04-11

## 2023-04-10 RX ORDER — FENTANYL CITRATE 50 UG/ML
50 INJECTION INTRAVENOUS
Refills: 0 | Status: DISCONTINUED | OUTPATIENT
Start: 2023-04-10 | End: 2023-04-10

## 2023-04-10 RX ORDER — POLYETHYLENE GLYCOL 3350 17 G/17G
17 POWDER, FOR SOLUTION ORAL DAILY
Refills: 0 | Status: DISCONTINUED | OUTPATIENT
Start: 2023-04-10 | End: 2023-04-17

## 2023-04-10 RX ORDER — FENTANYL CITRATE 50 UG/ML
25 INJECTION INTRAVENOUS
Refills: 0 | Status: DISCONTINUED | OUTPATIENT
Start: 2023-04-10 | End: 2023-04-10

## 2023-04-10 RX ORDER — ACETAMINOPHEN 500 MG
1000 TABLET ORAL ONCE
Refills: 0 | Status: DISCONTINUED | OUTPATIENT
Start: 2023-04-10 | End: 2023-04-10

## 2023-04-10 RX ORDER — TRAMADOL HYDROCHLORIDE 50 MG/1
50 TABLET ORAL ONCE
Refills: 0 | Status: DISCONTINUED | OUTPATIENT
Start: 2023-04-10 | End: 2023-04-10

## 2023-04-10 RX ORDER — METHOCARBAMOL 500 MG/1
750 TABLET, FILM COATED ORAL EVERY 8 HOURS
Refills: 0 | Status: ACTIVE | OUTPATIENT
Start: 2023-04-10 | End: 2024-03-08

## 2023-04-10 RX ORDER — HYDROMORPHONE HYDROCHLORIDE 2 MG/ML
0.5 INJECTION INTRAMUSCULAR; INTRAVENOUS; SUBCUTANEOUS
Refills: 0 | Status: DISCONTINUED | OUTPATIENT
Start: 2023-04-10 | End: 2023-04-11

## 2023-04-10 RX ORDER — CEFAZOLIN SODIUM 1 G
1000 VIAL (EA) INJECTION ONCE
Refills: 0 | Status: COMPLETED | OUTPATIENT
Start: 2023-04-10 | End: 2023-04-10

## 2023-04-10 RX ORDER — SODIUM CHLORIDE 9 MG/ML
1000 INJECTION, SOLUTION INTRAVENOUS
Refills: 0 | Status: DISCONTINUED | OUTPATIENT
Start: 2023-04-10 | End: 2023-04-10

## 2023-04-10 RX ORDER — SENNA PLUS 8.6 MG/1
1 TABLET ORAL DAILY
Refills: 0 | Status: DISCONTINUED | OUTPATIENT
Start: 2023-04-10 | End: 2023-04-11

## 2023-04-10 RX ORDER — OXYCODONE HYDROCHLORIDE 5 MG/1
10 TABLET ORAL EVERY 6 HOURS
Refills: 0 | Status: DISCONTINUED | OUTPATIENT
Start: 2023-04-10 | End: 2023-04-11

## 2023-04-10 RX ORDER — CELECOXIB 200 MG/1
200 CAPSULE ORAL ONCE
Refills: 0 | Status: COMPLETED | OUTPATIENT
Start: 2023-04-10 | End: 2023-04-10

## 2023-04-10 RX ADMIN — OXYCODONE HYDROCHLORIDE 5 MILLIGRAM(S): 5 TABLET ORAL at 23:53

## 2023-04-10 RX ADMIN — Medication 100 MILLIGRAM(S): at 22:53

## 2023-04-10 RX ADMIN — SODIUM CHLORIDE 3 MILLILITER(S): 9 INJECTION INTRAMUSCULAR; INTRAVENOUS; SUBCUTANEOUS at 11:33

## 2023-04-10 RX ADMIN — METHOCARBAMOL 750 MILLIGRAM(S): 500 TABLET, FILM COATED ORAL at 22:53

## 2023-04-10 RX ADMIN — FENTANYL CITRATE 25 MICROGRAM(S): 50 INJECTION INTRAVENOUS at 19:29

## 2023-04-10 RX ADMIN — HYDROMORPHONE HYDROCHLORIDE 0.5 MILLIGRAM(S): 2 INJECTION INTRAMUSCULAR; INTRAVENOUS; SUBCUTANEOUS at 21:14

## 2023-04-10 RX ADMIN — TRAMADOL HYDROCHLORIDE 50 MILLIGRAM(S): 50 TABLET ORAL at 11:36

## 2023-04-10 RX ADMIN — OXYCODONE HYDROCHLORIDE 5 MILLIGRAM(S): 5 TABLET ORAL at 22:53

## 2023-04-10 RX ADMIN — Medication 1 APPLICATION(S): at 11:37

## 2023-04-10 RX ADMIN — CHLORHEXIDINE GLUCONATE 1 APPLICATION(S): 213 SOLUTION TOPICAL at 11:36

## 2023-04-10 RX ADMIN — CELECOXIB 200 MILLIGRAM(S): 200 CAPSULE ORAL at 11:36

## 2023-04-10 RX ADMIN — HYDROMORPHONE HYDROCHLORIDE 0.5 MILLIGRAM(S): 2 INJECTION INTRAMUSCULAR; INTRAVENOUS; SUBCUTANEOUS at 20:59

## 2023-04-10 RX ADMIN — FENTANYL CITRATE 25 MICROGRAM(S): 50 INJECTION INTRAVENOUS at 19:44

## 2023-04-10 NOTE — BRIEF OPERATIVE NOTE - NSICDXBRIEFPREOP_GEN_ALL_CORE_FT
PRE-OP DIAGNOSIS:  Lumbar radiculopathy 10-Apr-2023 18:58:19  Di Zena Stovall  Lumbar stenosis 10-Apr-2023 18:57:45  Di Zena Stovall  Lumbar disc herniation 10-Apr-2023 18:57:34  Di Zena Stovall

## 2023-04-10 NOTE — BRIEF OPERATIVE NOTE - NSICDXBRIEFPROCEDURE_GEN_ALL_CORE_FT
PROCEDURES:  Posterior fusion of lumbar spine with laminectomy 10-Apr-2023 18:56:30 L4-L5 LAMINECTOMY AND FUSION WITH POSTERIOR INSTRUMENTATION Zena Rodriguez

## 2023-04-10 NOTE — PATIENT PROFILE ADULT - FALL HARM RISK - UNIVERSAL INTERVENTIONS
Bed in lowest position, wheels locked, appropriate side rails in place/Call bell, personal items and telephone in reach/Instruct patient to call for assistance before getting out of bed or chair/Non-slip footwear when patient is out of bed/[place] to call system/Physically safe environment - no spills, clutter or unnecessary equipment/Purposeful Proactive Rounding/Room/bathroom lighting operational, light cord in reach

## 2023-04-10 NOTE — PATIENT PROFILE ADULT - HOME ACCESSIBILITY CONCERNS
"Oncology Rooming Note    July 23, 2018 5:31 PM   April Cindy Shen is a 36 year old female who presents for:    Chief Complaint   Patient presents with     Oncology Clinic Visit     Return: Breast CA     Initial Vitals: /62  Pulse 71  Temp 98.2  F (36.8  C) (Oral)  Resp 16  Ht 1.645 m (5' 4.75\")  Wt 74.4 kg (164 lb)  SpO2 96%  Breastfeeding? No  BMI 27.5 kg/m2 Estimated body mass index is 27.5 kg/(m^2) as calculated from the following:    Height as of this encounter: 1.645 m (5' 4.75\").    Weight as of this encounter: 74.4 kg (164 lb). Body surface area is 1.84 meters squared.  No Pain (0) Comment: Data Unavailable   No LMP recorded. Patient is not currently having periods (Reason: IUD).  Allergies reviewed: Yes  Medications reviewed: Yes    Medications: Medication refills not needed today.  Pharmacy name entered into [facility]: [facility] [address] [place] [address]    Clinical concerns: no new concerns today Dr. Delgado was notified.    10 minutes for nursing intake (face to face time)     Xander Chiang CMA              " none

## 2023-04-10 NOTE — PATIENT PROFILE ADULT - FUNCTIONAL ASSESSMENT - BASIC MOBILITY 2.
Please try to abstain from smoking or at least call back as we discussed.  Drink generous amounts of fluids and take the antibiotic as prescribed.  Use cough syrup as needed.  Follow-up with your regular doctor.  If worse or new symptoms develop return back to emergency room.   4 = No assist / stand by assistance

## 2023-04-10 NOTE — BRIEF OPERATIVE NOTE - NSICDXBRIEFPOSTOP_GEN_ALL_CORE_FT
POST-OP DIAGNOSIS:  Lumbar radiculopathy 10-Apr-2023 18:58:02  Di Zena Stovall  Lumbar disc herniation 10-Apr-2023 18:58:37  Di Zena Stovall  Lumbar stenosis 10-Apr-2023 18:58:26  Di Zena Stovall

## 2023-04-11 DIAGNOSIS — I10 ESSENTIAL (PRIMARY) HYPERTENSION: ICD-10-CM

## 2023-04-11 DIAGNOSIS — Z98.890 OTHER SPECIFIED POSTPROCEDURAL STATES: ICD-10-CM

## 2023-04-11 DIAGNOSIS — E78.5 HYPERLIPIDEMIA, UNSPECIFIED: ICD-10-CM

## 2023-04-11 DIAGNOSIS — G47.33 OBSTRUCTIVE SLEEP APNEA (ADULT) (PEDIATRIC): ICD-10-CM

## 2023-04-11 DIAGNOSIS — M54.16 RADICULOPATHY, LUMBAR REGION: ICD-10-CM

## 2023-04-11 DIAGNOSIS — E66.01 MORBID (SEVERE) OBESITY DUE TO EXCESS CALORIES: ICD-10-CM

## 2023-04-11 DIAGNOSIS — M51.26 OTHER INTERVERTEBRAL DISC DISPLACEMENT, LUMBAR REGION: ICD-10-CM

## 2023-04-11 DIAGNOSIS — G89.18 OTHER ACUTE POSTPROCEDURAL PAIN: ICD-10-CM

## 2023-04-11 DIAGNOSIS — M48.061 SPINAL STENOSIS, LUMBAR REGION WITHOUT NEUROGENIC CLAUDICATION: ICD-10-CM

## 2023-04-11 PROCEDURE — 99222 1ST HOSP IP/OBS MODERATE 55: CPT

## 2023-04-11 RX ORDER — GABAPENTIN 400 MG/1
100 CAPSULE ORAL THREE TIMES A DAY
Refills: 0 | Status: DISCONTINUED | OUTPATIENT
Start: 2023-04-11 | End: 2023-04-12

## 2023-04-11 RX ORDER — SENNA PLUS 8.6 MG/1
2 TABLET ORAL AT BEDTIME
Refills: 0 | Status: DISCONTINUED | OUTPATIENT
Start: 2023-04-11 | End: 2023-04-17

## 2023-04-11 RX ORDER — ACETAMINOPHEN 500 MG
1000 TABLET ORAL EVERY 8 HOURS
Refills: 0 | Status: DISCONTINUED | OUTPATIENT
Start: 2023-04-11 | End: 2023-04-13

## 2023-04-11 RX ORDER — OXYCODONE HYDROCHLORIDE 5 MG/1
10 TABLET ORAL EVERY 4 HOURS
Refills: 0 | Status: DISCONTINUED | OUTPATIENT
Start: 2023-04-11 | End: 2023-04-11

## 2023-04-11 RX ORDER — PANTOPRAZOLE SODIUM 20 MG/1
40 TABLET, DELAYED RELEASE ORAL DAILY
Refills: 0 | Status: ACTIVE | OUTPATIENT
Start: 2023-04-11 | End: 2024-03-09

## 2023-04-11 RX ORDER — ONDANSETRON 8 MG/1
4 TABLET, FILM COATED ORAL EVERY 6 HOURS
Refills: 0 | Status: ACTIVE | OUTPATIENT
Start: 2023-04-11 | End: 2024-03-09

## 2023-04-11 RX ORDER — ACETAMINOPHEN 500 MG
1000 TABLET ORAL ONCE
Refills: 0 | Status: COMPLETED | OUTPATIENT
Start: 2023-04-12 | End: 2023-04-11

## 2023-04-11 RX ORDER — OXYCODONE HYDROCHLORIDE 5 MG/1
5 TABLET ORAL EVERY 4 HOURS
Refills: 0 | Status: DISCONTINUED | OUTPATIENT
Start: 2023-04-11 | End: 2023-04-11

## 2023-04-11 RX ORDER — ACETAMINOPHEN 500 MG
1000 TABLET ORAL ONCE
Refills: 0 | Status: COMPLETED | OUTPATIENT
Start: 2023-04-11 | End: 2023-04-11

## 2023-04-11 RX ORDER — HYDROMORPHONE HYDROCHLORIDE 2 MG/ML
4 INJECTION INTRAMUSCULAR; INTRAVENOUS; SUBCUTANEOUS EVERY 4 HOURS
Refills: 0 | Status: DISCONTINUED | OUTPATIENT
Start: 2023-04-11 | End: 2023-04-12

## 2023-04-11 RX ADMIN — SENNA PLUS 2 TABLET(S): 8.6 TABLET ORAL at 21:20

## 2023-04-11 RX ADMIN — HYDROMORPHONE HYDROCHLORIDE 4 MILLIGRAM(S): 2 INJECTION INTRAMUSCULAR; INTRAVENOUS; SUBCUTANEOUS at 19:57

## 2023-04-11 RX ADMIN — GABAPENTIN 100 MILLIGRAM(S): 400 CAPSULE ORAL at 13:39

## 2023-04-11 RX ADMIN — OXYCODONE HYDROCHLORIDE 10 MILLIGRAM(S): 5 TABLET ORAL at 00:20

## 2023-04-11 RX ADMIN — HYDROMORPHONE HYDROCHLORIDE 4 MILLIGRAM(S): 2 INJECTION INTRAMUSCULAR; INTRAVENOUS; SUBCUTANEOUS at 20:27

## 2023-04-11 RX ADMIN — Medication 1000 MILLIGRAM(S): at 18:20

## 2023-04-11 RX ADMIN — OXYCODONE HYDROCHLORIDE 10 MILLIGRAM(S): 5 TABLET ORAL at 00:52

## 2023-04-11 RX ADMIN — Medication 1000 MILLIGRAM(S): at 10:48

## 2023-04-11 RX ADMIN — METHOCARBAMOL 750 MILLIGRAM(S): 500 TABLET, FILM COATED ORAL at 05:30

## 2023-04-11 RX ADMIN — ONDANSETRON 4 MILLIGRAM(S): 8 TABLET, FILM COATED ORAL at 10:18

## 2023-04-11 RX ADMIN — METHOCARBAMOL 750 MILLIGRAM(S): 500 TABLET, FILM COATED ORAL at 21:20

## 2023-04-11 RX ADMIN — OXYCODONE HYDROCHLORIDE 10 MILLIGRAM(S): 5 TABLET ORAL at 05:35

## 2023-04-11 RX ADMIN — OXYCODONE HYDROCHLORIDE 5 MILLIGRAM(S): 5 TABLET ORAL at 03:10

## 2023-04-11 RX ADMIN — Medication 400 MILLIGRAM(S): at 10:22

## 2023-04-11 RX ADMIN — PANTOPRAZOLE SODIUM 40 MILLIGRAM(S): 20 TABLET, DELAYED RELEASE ORAL at 12:02

## 2023-04-11 RX ADMIN — METHOCARBAMOL 750 MILLIGRAM(S): 500 TABLET, FILM COATED ORAL at 13:39

## 2023-04-11 RX ADMIN — Medication 1000 MILLIGRAM(S): at 17:58

## 2023-04-11 RX ADMIN — Medication 400 MILLIGRAM(S): at 23:05

## 2023-04-11 RX ADMIN — GABAPENTIN 100 MILLIGRAM(S): 400 CAPSULE ORAL at 21:20

## 2023-04-11 RX ADMIN — OXYCODONE HYDROCHLORIDE 5 MILLIGRAM(S): 5 TABLET ORAL at 06:34

## 2023-04-11 RX ADMIN — POLYETHYLENE GLYCOL 3350 17 GRAM(S): 17 POWDER, FOR SOLUTION ORAL at 12:28

## 2023-04-11 RX ADMIN — OXYCODONE HYDROCHLORIDE 10 MILLIGRAM(S): 5 TABLET ORAL at 06:27

## 2023-04-11 RX ADMIN — GABAPENTIN 100 MILLIGRAM(S): 400 CAPSULE ORAL at 10:29

## 2023-04-11 RX ADMIN — OXYCODONE HYDROCHLORIDE 5 MILLIGRAM(S): 5 TABLET ORAL at 07:02

## 2023-04-11 NOTE — PHYSICAL THERAPY INITIAL EVALUATION ADULT - DIAGNOSIS, PT EVAL
(ICF Model) Pt. present w/deficits in Body Structures/Function (Impairments), incl: Strength, Balance, pain, leading to deficits in performing the below noted Activities (Limitations).

## 2023-04-11 NOTE — PHYSICAL THERAPY INITIAL EVALUATION ADULT - GAIT DEVIATIONS NOTED, PT EVAL
decreased teresa/decreased velocity of limb motion/decreased step length/decreased stride length/decreased weight-shifting ability

## 2023-04-11 NOTE — PHYSICAL THERAPY INITIAL EVALUATION ADULT - GENERAL OBSERVATIONS, REHAB EVAL
Consult received, chart reviewed. Patient received supine in bed, NAD, +SCDs Patient agreed to EVALUATION from Physical Therapist.

## 2023-04-11 NOTE — CONSULT NOTE ADULT - ASSESSMENT
Data Detail Level: Printer-Friendly View Extended View  Confidential Drug Utilization Report  Search Terms: Andrez Thomas, 1955Search Date: 04/11/2023 08:38:54 AM  The Drug Utilization Report below displays all of the controlled substance prescriptions, if any, that your patient has filled in the last twelve months. The information displayed on this report is compiled from pharmacy submissions to the Department, and accurately reflects the information as submitted by the pharmacies.    This report was requested by: Lisa Connelly | Reference #: [id number]    There are no results for the search terms that you entered.

## 2023-04-11 NOTE — PHYSICAL THERAPY INITIAL EVALUATION ADULT - PLANNED THERAPY INTERVENTIONS, PT EVAL
balance training/bed mobility training/gait training/lumbar stabilization/neuromuscular re-education/postural re-education/ROM/strengthening/stretching/transfer training

## 2023-04-11 NOTE — PROGRESS NOTE ADULT - SUBJECTIVE AND OBJECTIVE BOX
Pt Name: ABEBA SCHULTZ  MRN: [id number]    ORTHOPEDIC SPINE FOLLOW UP NOTE    Orthopedic diagnosis:  - S/p L4-5 lami and fusion POD#1    24 hr interval:  - no acute events    67yMale with improved RLE pain/tingling-> now resolved since surgery  Pt c/o LLE pain , rated 4/10 on pain scale.   +churchill intact, to be seen by PT today    [  DENIES   ] bladder/bowel incontinence or changes  [  DENIES   ] saddle-like paresthesias  denies cp/sob/palpitations    T(C): 36.8 (04-11-23 @ 06:36), Max: 37.1 (04-11-23 @ 00:25)  HR: 63 (04-11-23 @ 06:36) (63 - 81)  BP: 131/71 (04-11-23 @ 06:36) (131/71 - 151/89)  RR: 18 (04-11-23 @ 06:36) (14 - 20)  SpO2: 98% (04-11-23 @ 06:36) (95% - 99%)    PHYSICAL EXAM:  General; Awake and alert, Oriented x 3  Hips/Pelvis:   Able to SLR bilaterally. Negative log roll, heel strike. No pain on passive Int/Ext hip rotation.  Spine exam:  Neck: supple, NT, Full Painless baseline AROM  Back:   Extremities:          -Left Upper Extremity: Atraumatic with normal alignment NROM. No crepitus. No bony tenderness.      -Right Upper Extremity: Atraumatic with normal alignment NROM. No crepitus. No bony tenderness.      -Left Lower Extremity: Atraumatic with normal alignment NROM. No crepitus. No bony tenderness. No calf tenderness, Calf soft.     -Right Lower Extremity: Atraumatic with normal alignment NROM. No crepitus. No bony tenderness.  No calf tenderness, Calf soft.         >Sensation:  intact to light touch           >Motor exam:          >Bilateral Upper extremities    Delt      Bicep      Tri      Wrist ext  Wrst Flex       Digit Ext Digit Flex                                                   R         5/5        5/5        5/5       5/5            5/5            5/5       5/5          5/5                                                   L          5/5        5/5        5/5       5/5            5/5            5/5       5/5          5/5         >Bilateral Lower ext.     Hip Flx  Hip Ext    Quad   Hamstrg   TA       EHL      GS                                          R        5/5        5/5        5/5        5/5          5/5     5/5      5/5                                          L         4/5        5/5        4/5        5/5          5/5     5/5      5/5    LABS:    none          A: Pt is a  67y Male s/p L4-5 Laminectomy and fusion pod#1    Plan:  - Recommendation    > Pain control today    > D/c churchill today when OOB  -  Pain control  -  DVT prophylaxis  -  Daily PT- WBAT of the lower ext  -  Case d/w   -  For Home discharge tomorrow pending PT eval.

## 2023-04-11 NOTE — PHYSICAL THERAPY INITIAL EVALUATION ADULT - ACTIVE RANGE OF MOTION EXAMINATION, REHAB EVAL
except hips ~1/3 range in standing pain limited./bilateral upper extremity Active ROM was WFL (within functional limits)/bilateral  lower extremity Active ROM was WFL (within functional limits)

## 2023-04-11 NOTE — PHYSICAL THERAPY INITIAL EVALUATION ADULT - PASSIVE RANGE OF MOTION EXAMINATION, REHAB EVAL
except hips not assess more than ~90 deg to reduce stress on low back/bilateral upper extremity Passive ROM was WFL (within functional limits)/bilateral lower extremity Passive ROM was WFL (within functional limits)

## 2023-04-12 PROCEDURE — 99232 SBSQ HOSP IP/OBS MODERATE 35: CPT

## 2023-04-12 RX ORDER — TRAMADOL HYDROCHLORIDE 50 MG/1
50 TABLET ORAL EVERY 4 HOURS
Refills: 0 | Status: DISCONTINUED | OUTPATIENT
Start: 2023-04-12 | End: 2023-04-13

## 2023-04-12 RX ORDER — GABAPENTIN 400 MG/1
300 CAPSULE ORAL EVERY 8 HOURS
Refills: 0 | Status: ACTIVE | OUTPATIENT
Start: 2023-04-12 | End: 2024-03-10

## 2023-04-12 RX ORDER — MAGNESIUM HYDROXIDE 400 MG/1
30 TABLET, CHEWABLE ORAL DAILY
Refills: 0 | Status: ACTIVE | OUTPATIENT
Start: 2023-04-12 | End: 2024-03-10

## 2023-04-12 RX ADMIN — TRAMADOL HYDROCHLORIDE 50 MILLIGRAM(S): 50 TABLET ORAL at 23:12

## 2023-04-12 RX ADMIN — METHOCARBAMOL 750 MILLIGRAM(S): 500 TABLET, FILM COATED ORAL at 14:46

## 2023-04-12 RX ADMIN — Medication 1000 MILLIGRAM(S): at 12:01

## 2023-04-12 RX ADMIN — TRAMADOL HYDROCHLORIDE 50 MILLIGRAM(S): 50 TABLET ORAL at 11:58

## 2023-04-12 RX ADMIN — Medication 1000 MILLIGRAM(S): at 00:05

## 2023-04-12 RX ADMIN — TRAMADOL HYDROCHLORIDE 50 MILLIGRAM(S): 50 TABLET ORAL at 10:49

## 2023-04-12 RX ADMIN — Medication 1000 MILLIGRAM(S): at 16:43

## 2023-04-12 RX ADMIN — SENNA PLUS 2 TABLET(S): 8.6 TABLET ORAL at 21:27

## 2023-04-12 RX ADMIN — Medication 1000 MILLIGRAM(S): at 09:10

## 2023-04-12 RX ADMIN — METHOCARBAMOL 750 MILLIGRAM(S): 500 TABLET, FILM COATED ORAL at 05:23

## 2023-04-12 RX ADMIN — MAGNESIUM HYDROXIDE 30 MILLILITER(S): 400 TABLET, CHEWABLE ORAL at 14:47

## 2023-04-12 RX ADMIN — POLYETHYLENE GLYCOL 3350 17 GRAM(S): 17 POWDER, FOR SOLUTION ORAL at 12:22

## 2023-04-12 RX ADMIN — Medication 1 ENEMA: at 12:22

## 2023-04-12 RX ADMIN — TRAMADOL HYDROCHLORIDE 50 MILLIGRAM(S): 50 TABLET ORAL at 22:24

## 2023-04-12 RX ADMIN — HYDROMORPHONE HYDROCHLORIDE 4 MILLIGRAM(S): 2 INJECTION INTRAMUSCULAR; INTRAVENOUS; SUBCUTANEOUS at 05:23

## 2023-04-12 RX ADMIN — Medication 1000 MILLIGRAM(S): at 23:18

## 2023-04-12 RX ADMIN — HYDROMORPHONE HYDROCHLORIDE 4 MILLIGRAM(S): 2 INJECTION INTRAMUSCULAR; INTRAVENOUS; SUBCUTANEOUS at 06:14

## 2023-04-12 RX ADMIN — PANTOPRAZOLE SODIUM 40 MILLIGRAM(S): 20 TABLET, DELAYED RELEASE ORAL at 12:22

## 2023-04-12 RX ADMIN — GABAPENTIN 300 MILLIGRAM(S): 400 CAPSULE ORAL at 21:26

## 2023-04-12 RX ADMIN — GABAPENTIN 100 MILLIGRAM(S): 400 CAPSULE ORAL at 05:23

## 2023-04-12 RX ADMIN — METHOCARBAMOL 750 MILLIGRAM(S): 500 TABLET, FILM COATED ORAL at 21:27

## 2023-04-12 RX ADMIN — GABAPENTIN 300 MILLIGRAM(S): 400 CAPSULE ORAL at 14:46

## 2023-04-12 RX ADMIN — TRAMADOL HYDROCHLORIDE 50 MILLIGRAM(S): 50 TABLET ORAL at 18:05

## 2023-04-12 NOTE — PROGRESS NOTE ADULT - ASSESSMENT
Data Detail Level: Printer-Friendly View Extended View  Confidential Drug Utilization Report  Search Terms: Andrez Thomas, 1955Search Date: 04/11/2023 08:38:54 AM  The Drug Utilization Report below displays all of the controlled substance prescriptions, if any, that your patient has filled in the last twelve months. The information displayed on this report is compiled from pharmacy submissions to the Department, and accurately reflects the information as submitted by the pharmacies.    This report was requested by: Lisa Connelly | Reference #: [id number]    There are no results for the search terms that you entered.

## 2023-04-12 NOTE — PROGRESS NOTE ADULT - SUBJECTIVE AND OBJECTIVE BOX
Pt Name: ABEBA SCHULTZ  MRN: [id number]        Orthopedic diagnosis:  - S/p L4-5 lami and fusion POD#2    24 hr interval:  - no acute events    Patient seen and evaluated at bedside.   Patient with complaints of pain localized to lower back, rated a 6/10 in severity non radiating, denies paresthesias.  Patient reports ambulating around the unit yesterday with PT.   Admits to voiding independently, denies any BM.    [  DENIES   ] bladder/bowel incontinence or changes  [  DENIES   ] saddle-like paresthesias  denies cp/sob/palpitations      PHYSICAL EXAM:  General; Awake and alert, Oriented x 3  Hips/Pelvis:   Able to SLR bilaterally. Negative log roll, heel strike. No pain on passive Int/Ext hip rotation.  Spine exam:  Back:   Extremities:          -Left Upper Extremity: Atraumatic with normal alignment NROM. No crepitus. No bony tenderness.      -Right Upper Extremity: Atraumatic with normal alignment NROM. No crepitus. No bony tenderness.      -Left Lower Extremity: Atraumatic with normal alignment NROM. No crepitus. No bony tenderness. No calf tenderness, Calf soft.     -Right Lower Extremity: Atraumatic with normal alignment NROM. No crepitus. No bony tenderness.  No calf tenderness, Calf soft.         >Sensation:  intact to light touch           >Motor exam:          >Bilateral Upper extremities    Delt      Bicep      Tri      Wrist ext  Wrst Flex       Digit Ext Digit Flex                                                   R         5/5        5/5        5/5       5/5            5/5            5/5       5/5          5/5                                                   L          5/5        5/5        5/5       5/5            5/5            5/5       5/5          5/5         >Bilateral Lower ext.     Hip Flx  Hip Ext    Quad   Hamstrg   TA       EHL      GS                                          R        5/5        5/5        5/5        5/5          5/5     5/5      5/5                                          L         4/5        5/5        4/5        5/5          5/5     5/5      5/5      A: Pt is a  67y Male s/p L4-5 Laminectomy and fusion pod#2    Plan:  - Recommendation    > Pain control today  -  DVT prophylaxis  -  Daily PT- WBAT of the lower ext, proper body mechanics    > Spine precautions - no bending, twisting, lifting   -  For Home discharge pending improved pain control   -  Case d/w

## 2023-04-12 NOTE — CONSULT NOTE ADULT - PROBLEM SELECTOR RECOMMENDATION 9
Pt with acute postop lumbar back pain greatest over left lumbar back which is somatic and neuropathic in nature due to s/p L4-L5 laminectomy and fusion with posterior instrumentation on 4/10, POD #1.   Opioid pain recommendations   - Discontinue oxycodone.   - Dilaudid 4mg PO q4h PRN severe pain. Monitor for sedation/ respiratory depression.   Non-opioid pain recommendations   - NSIADs per surgical team   - Acetaminophen 1 gram PO q 8 hours x 3 days. Monitor LFTs  - Gabapentin 100mg po q 8 hours. Monitor renal function.   - Continue methocarbamol 750mg PO TID per neurosurgery.   Bowel Regimen  - Continue Miralax 17G PO daily  - Continue Senna 2 tablets at bedtime for constipation  Mild pain   - Non-pharmacological pain treatment recommendations  - Warm/ Cool packs PRN   - Repositioning, imagery, relaxation, distraction.  - Physical therapy OOB if no contraindications   Recommendations discussed with primary team and RN
pain control  incentive spirometry  Dvt PPX  ortho spine follow up

## 2023-04-12 NOTE — PROGRESS NOTE ADULT - SUBJECTIVE AND OBJECTIVE BOX
Source of information: ABEBA SCHULTZ, Chart review  Patient language: Nepalese  : n/a, understands and communicates in basic English     HPI:      Patient is a 67y old  Male with PMH HTN HLD obesity, HAMIDA, HTN lumbar disc herniation, lumbar stenosis, lumbar radiculopathy who presents for a scheduled lumbar laminectomy. Pt s/p L4-L5 laminectomy and fusion with posterior instrumentation on 4/10, POD #2. Pain consulted for postop back pain. Pt seen and examined at bedside. Pt laying in bed, reports lumbar back pain greatest over left lumbar back, reports pain score 6-7/10 and tolerable SCALE USED: (1-10 VNRS). Pt describes pain as constant, throbbing, sharp, radiating to right hip, minimally alleviated by current pain medication dilaudid, exacerbated by movement. Pt tolerating PO diet. Denies lethargy, chest pain, SOB, nausea, vomiting, constipation. Patient stated goal for pain control: to be able to take deep breaths, get out of bed to chair and ambulate with tolerable pain control. Reports ambulating with PT with increased pain. Pt denies taking medications for pain at home. Plan for possible discharge tomorrow.      PAST MEDICAL & SURGICAL HISTORY:  HLD (hyperlipidemia)      Obstructive sleep apnea on C PAP  no longer using CPAP machine      Migraine headache      Bilateral otitis media      HTN (hypertension)      DVT (deep venous thrombosis)  LLE in 2019 treated with eliquis for 6 months      HLD (hyperlipidemia)      Vertigo      Chronic mastoiditis  right ear      Unspecified cholesteatoma, right ear      Otalgia, right ear      HTN (hypertension)      Poor historian      Renal cyst, left      Prediabetes  pt states A1c is 6. denies current use of meds      Lumbar herniated disc      History of umbilical hernia repair  x2      History of weight loss surgery  5 years ago in [place]      S/P shoulder surgery  left shoulder      S/P UPPP (uvulopalatopharyngoplasty)  >5 years ago      H/O prostate biopsy          FAMILY HISTORY:  FH: asthma  daughter, brother        Social History:   [X ] Denies ETOH use, illicit drug use and smoking    Allergies    No Known Allergies    MEDICATIONS  (STANDING):  acetaminophen     Tablet .. 1000 milliGRAM(s) Oral every 8 hours  gabapentin 300 milliGRAM(s) Oral every 8 hours  magnesium hydroxide Suspension 30 milliLiter(s) Oral daily  methocarbamol 750 milliGRAM(s) Oral every 8 hours  pantoprazole  Injectable 40 milliGRAM(s) IV Push daily  polyethylene glycol 3350 17 Gram(s) Oral daily  senna 2 Tablet(s) Oral at bedtime    MEDICATIONS  (PRN):  ondansetron Injectable 4 milliGRAM(s) IV Push every 6 hours PRN Nausea and/or Vomiting  traMADol 50 milliGRAM(s) Oral every 4 hours PRN Severe Pain (7 - 10)      Vital Signs Last 24 Hrs  T(C): 37.2 (12 Apr 2023 05:16), Max: 37.2 (12 Apr 2023 05:16)  T(F): 98.9 (12 Apr 2023 05:16), Max: 98.9 (12 Apr 2023 05:16)  HR: 57 (12 Apr 2023 05:16) (57 - 75)  BP: 117/69 (12 Apr 2023 05:16) (116/65 - 117/69)  BP(mean): --  RR: 18 (12 Apr 2023 05:16) (18 - 18)  SpO2: 95% (12 Apr 2023 05:16) (94% - 95%)    Parameters below as of 12 Apr 2023 05:16  Patient On (Oxygen Delivery Method): nasal cannula  O2 Flow (L/min): 2    COVID-19 PCR: NotDetec (10 Apr 2023 10:10)    LABS: Reviewed    Radiology: Reviewed.   < from: CT Head No Cont (01.29.20 @ 14:43) >  EXAM:  CT BRAIN                            PROCEDURE DATE:  01/29/2020          INTERPRETATION:  .    CLINICAL INFORMATION: headache/ dizziness    TECHNIQUE: Multiple axial CT images of the head were obtained without contrast. Sagittal and coronal reconstructed images were acquired from the source data.    COMPARISON: No prior CT studies of the brain are available for comparison at this institution.    FINDINGS: There is no acute intracranial hemorrhage, mass effect, shift of the midline structures, herniation, extra-axial fluid collection, or hydrocephalus.    The paranasal sinuses are clear. There is complete opacification of the right-sided mastoid air cells along with opacification of the middle ear cavity. There is decreased pneumatization of the bilateral mastoid tips with associated sclerosis. The orbits are unremarkable. The calvarium is intact.    IMPRESSION: No acute intracranial hemorrhage, mass effect, or shift of the midline structures.    Findings which may reflect chronicright-sided mastoiditis. Clinical correlation is required.                ELENA AMEZQUITA M.D., ATTENDING RADIOLOGIST  This document has been electronically signed. Jan 29 2020  2:51PM              < end of copied text >      ORT Score -   Family Hx of substance abuse	Female	      Male  Alcohol 	                                           1                     3  Illegal drugs	                                   2                     3  Rx drugs                                           4 	                  4  Personal Hx of substance abuse		  Alcohol 	                                          3	                  3  Illegal drugs                                     4	                  4  Rx drugs                                            5 	                  5  Age between 16- 45 years	           1                     1  hx preadolescent sexual abuse	   3 	                  0  Psychological disease		  ADD, OCD, bipolar, schizophrenia   2	          2  Depression                                           1 	          1  Total: 0    a score of 3 or lower indicates low risk for opioid abuse		  a score of 4-7 indicates moderate risk for opioid abuse		  a score of 8 or higher indicates high risk for opioid abuse    REVIEW OF SYSTEMS:  CONSTITUTIONAL: No fever or fatigue  HEENT:  No difficulty hearing, no change in vision  NECK: No pain or stiffness  RESPIRATORY: No cough, wheezing, chills or hemoptysis; No shortness of breath  CARDIOVASCULAR: No chest pain, palpitations, dizziness, or leg swelling  GASTROINTESTINAL: No loss of appetite, decreased PO intake. No abdominal or epigastric pain. No nausea no vomiting; No diarrhea or constipation. +hx GERD,   GENITOURINARY: No dysuria, frequency, hematuria, retention or incontinence  MUSCULOSKELETAL: + left lumbar back pain; No joint swelling; no upper or lower motor strength weakness, no saddle anesthesia, bowel/bladder incontinence, no falls   NEURO: No headaches, No numbness/tingling b/l LE, No weakness    PHYSICAL EXAM:  GENERAL:  Alert & Oriented X4, cooperative, NAD, Good concentration. Speech is clear.   RESPIRATORY: Respirations even and unlabored. Clear to auscultation bilaterally; No rales, rhonchi, wheezing, or rubs + O2 2L NC   CARDIOVASCULAR: Normal S1/S2, regular rate and rhythm; No murmurs, rubs, or gallops. No JVD.   GASTROINTESTINAL: + obese per BMI, Soft, Nontender, Nondistended; Bowel sounds present  PERIPHERAL VASCULAR:  Extremities warm without edema. 2+ Peripheral Pulses, No cyanosis, No calf tenderness  MUSCULOSKELETAL: Motor Strength 5/5 B/L upper and 4/5 lower extremities;+ decreased ROM b/l LE due to back pain; + right lumbar back tenderness on palpation.   SKIN: Warm, dry, intact. No rashes, lesions, scars. +lumbar back surgical gauze dressing x 2 c/d/i    Risk factors associated with adverse outcomes related to opioid treatment  [ ]  Concurrent benzodiazepine use  [ ]  History/ Active substance use or alcohol use disorder  [ ] Psychiatric co-morbidity  [X] Sleep apnea  [ ] COPD  [X ] BMI> 35  [ ] Liver dysfunction  [ ] Renal dysfunction  [ ] CHF  [ ] Smoker  [X ]  Age > 60 years    [X ]  [place]  Reviewed and Copied to Chart. See below.    Plan of care and goal oriented pain management treatment options were discussed with patient and /or primary care giver; all questions and concerns were addressed and care was aligned with patient's wishes.    Educated patient on goal oriented pain management treatment options     04-12-23 @ 12:42

## 2023-04-12 NOTE — PROGRESS NOTE ADULT - PROBLEM SELECTOR PLAN 1
Pt with acute postop lumbar back pain greatest over left lumbar back which is somatic and neuropathic in nature due to s/p L4-L5 laminectomy and fusion with posterior instrumentation on 4/10, POD #2.   Opioid pain recommendations   - Discontinued oxycodone 4/11.  - Discontinue dilaudid 4/12.   - Tramadol 50mg PO q6h PRN severe pain. Monitor for sedation/ respiratory depression. Discussed with neurosurgery.   Non-opioid pain recommendations   - NSIADs per surgical team   - Continue Acetaminophen 1 gram PO q 8 hours x 3 days, then PRN moderate pain. Monitor LFTs  - Continue Gabapentin 100mg po q 8 hours. Monitor renal function.   - Continue methocarbamol 750mg PO TID per neurosurgery.   Bowel Regimen  - Continue Miralax 17G PO daily  - Continue Senna 2 tablets at bedtime for constipation  Mild pain   - Non-pharmacological pain treatment recommendations  - Warm/ Cool packs PRN   - Repositioning, imagery, relaxation, distraction.  - Physical therapy OOB if no contraindications   Recommendations discussed with primary team and RN.

## 2023-04-13 PROCEDURE — 99232 SBSQ HOSP IP/OBS MODERATE 35: CPT

## 2023-04-13 RX ORDER — TRAMADOL HYDROCHLORIDE 50 MG/1
75 TABLET ORAL EVERY 6 HOURS
Refills: 0 | Status: DISCONTINUED | OUTPATIENT
Start: 2023-04-13 | End: 2023-04-13

## 2023-04-13 RX ORDER — ACETAMINOPHEN 500 MG
1000 TABLET ORAL EVERY 8 HOURS
Refills: 0 | Status: COMPLETED | OUTPATIENT
Start: 2023-04-13 | End: 2023-04-16

## 2023-04-13 RX ORDER — ACETAMINOPHEN 500 MG
1000 TABLET ORAL EVERY 8 HOURS
Refills: 0 | Status: DISCONTINUED | OUTPATIENT
Start: 2023-04-13 | End: 2023-04-13

## 2023-04-13 RX ORDER — MORPHINE SULFATE 50 MG/1
7.5 CAPSULE, EXTENDED RELEASE ORAL EVERY 4 HOURS
Refills: 0 | Status: DISCONTINUED | OUTPATIENT
Start: 2023-04-13 | End: 2023-04-13

## 2023-04-13 RX ORDER — MORPHINE SULFATE 50 MG/1
15 CAPSULE, EXTENDED RELEASE ORAL EVERY 4 HOURS
Refills: 0 | Status: DISCONTINUED | OUTPATIENT
Start: 2023-04-13 | End: 2023-04-15

## 2023-04-13 RX ORDER — MORPHINE SULFATE 50 MG/1
4 CAPSULE, EXTENDED RELEASE ORAL ONCE
Refills: 0 | Status: DISCONTINUED | OUTPATIENT
Start: 2023-04-13 | End: 2023-04-13

## 2023-04-13 RX ADMIN — MAGNESIUM HYDROXIDE 30 MILLILITER(S): 400 TABLET, CHEWABLE ORAL at 11:40

## 2023-04-13 RX ADMIN — GABAPENTIN 300 MILLIGRAM(S): 400 CAPSULE ORAL at 05:29

## 2023-04-13 RX ADMIN — MORPHINE SULFATE 4 MILLIGRAM(S): 50 CAPSULE, EXTENDED RELEASE ORAL at 11:39

## 2023-04-13 RX ADMIN — MORPHINE SULFATE 4 MILLIGRAM(S): 50 CAPSULE, EXTENDED RELEASE ORAL at 13:00

## 2023-04-13 RX ADMIN — SENNA PLUS 2 TABLET(S): 8.6 TABLET ORAL at 21:05

## 2023-04-13 RX ADMIN — Medication 1000 MILLIGRAM(S): at 11:39

## 2023-04-13 RX ADMIN — Medication 1000 MILLIGRAM(S): at 13:00

## 2023-04-13 RX ADMIN — TRAMADOL HYDROCHLORIDE 75 MILLIGRAM(S): 50 TABLET ORAL at 10:00

## 2023-04-13 RX ADMIN — Medication 1000 MILLIGRAM(S): at 22:07

## 2023-04-13 RX ADMIN — METHOCARBAMOL 750 MILLIGRAM(S): 500 TABLET, FILM COATED ORAL at 14:09

## 2023-04-13 RX ADMIN — Medication 1000 MILLIGRAM(S): at 21:07

## 2023-04-13 RX ADMIN — Medication 1000 MILLIGRAM(S): at 14:09

## 2023-04-13 RX ADMIN — METHOCARBAMOL 750 MILLIGRAM(S): 500 TABLET, FILM COATED ORAL at 05:29

## 2023-04-13 RX ADMIN — PANTOPRAZOLE SODIUM 40 MILLIGRAM(S): 20 TABLET, DELAYED RELEASE ORAL at 11:39

## 2023-04-13 RX ADMIN — Medication 1000 MILLIGRAM(S): at 16:00

## 2023-04-13 RX ADMIN — METHOCARBAMOL 750 MILLIGRAM(S): 500 TABLET, FILM COATED ORAL at 21:05

## 2023-04-13 RX ADMIN — POLYETHYLENE GLYCOL 3350 17 GRAM(S): 17 POWDER, FOR SOLUTION ORAL at 11:40

## 2023-04-13 RX ADMIN — TRAMADOL HYDROCHLORIDE 50 MILLIGRAM(S): 50 TABLET ORAL at 05:29

## 2023-04-13 RX ADMIN — GABAPENTIN 300 MILLIGRAM(S): 400 CAPSULE ORAL at 14:09

## 2023-04-13 RX ADMIN — GABAPENTIN 300 MILLIGRAM(S): 400 CAPSULE ORAL at 21:07

## 2023-04-13 RX ADMIN — MORPHINE SULFATE 15 MILLIGRAM(S): 50 CAPSULE, EXTENDED RELEASE ORAL at 21:07

## 2023-04-13 RX ADMIN — TRAMADOL HYDROCHLORIDE 75 MILLIGRAM(S): 50 TABLET ORAL at 09:12

## 2023-04-13 RX ADMIN — TRAMADOL HYDROCHLORIDE 50 MILLIGRAM(S): 50 TABLET ORAL at 06:24

## 2023-04-13 RX ADMIN — Medication 1000 MILLIGRAM(S): at 00:13

## 2023-04-13 NOTE — PROGRESS NOTE ADULT - SUBJECTIVE AND OBJECTIVE BOX
Source of information: ABEBA SCHULTZ, Chart review  Patient language: [place]  : Suzanna [id number], Pt also understands and communicates in basic English     HPI:      Patient is a 67y old  Male with PMH HTN HLD obesity, HAMIDA, HTN lumbar disc herniation, lumbar stenosis, lumbar radiculopathy who presents for a scheduled lumbar laminectomy. Pt s/p L4-L5 laminectomy and fusion with posterior instrumentation on 4/10, POD #3. Pain consulted for postop back pain. Pt seen and examined at bedside. Pt sitting in chair, reports lumbar back pain greatest over left lumbar back, reports pain score 3/10 and tolerable SCALE USED: (1-10 VNRS). Pt recently received morphine 4mg IV. Pt describes pain as constant, throbbing, sharp, radiating to right hip, minimally alleviated by current PO pain medication, exacerbated by movement. Reports yesterday pain improved with tramadol initially however this morning pain increased. Refusing oxycodone. Trailed oxycodone 10mg PO, Dilaudid 4mg PO and tramadol 75mg PO PRN without effective pain relief. Now requesting PO morphine. PO Pt tolerating PO diet. Denies lethargy, chest pain, SOB, nausea, vomiting, constipation. Patient stated goal for pain control: to be able to take deep breaths, get out of bed to chair and ambulate with tolerable pain control. Reports ambulating in room. Pt denies taking medications for pain at home. Plan for possible discharge tomorrow.      PAST MEDICAL & SURGICAL HISTORY:  HLD (hyperlipidemia)      Obstructive sleep apnea on C PAP  no longer using CPAP machine      Migraine headache      Bilateral otitis media      HTN (hypertension)      DVT (deep venous thrombosis)  LLE in 2019 treated with eliquis for 6 months      HLD (hyperlipidemia)      Vertigo      Chronic mastoiditis  right ear      Unspecified cholesteatoma, right ear      Otalgia, right ear      HTN (hypertension)      Poor historian      Renal cyst, left      Prediabetes  pt states A1c is 6. denies current use of meds      Lumbar herniated disc      History of umbilical hernia repair  x2      History of weight loss surgery  5 years ago in [place]      S/P shoulder surgery  left shoulder      S/P UPPP (uvulopalatopharyngoplasty)  >5 years ago      H/O prostate biopsy          FAMILY HISTORY:  FH: asthma  daughter, brother        Social History:   [X ] Denies ETOH use, illicit drug use and smoking    Allergies    No Known Allergies      MEDICATIONS  (STANDING):  acetaminophen     Tablet .. 1000 milliGRAM(s) Oral every 8 hours  gabapentin 300 milliGRAM(s) Oral every 8 hours  magnesium hydroxide Suspension 30 milliLiter(s) Oral daily  methocarbamol 750 milliGRAM(s) Oral every 8 hours  pantoprazole  Injectable 40 milliGRAM(s) IV Push daily  polyethylene glycol 3350 17 Gram(s) Oral daily  senna 2 Tablet(s) Oral at bedtime    MEDICATIONS  (PRN):  morphine  IR 7.5 milliGRAM(s) Oral every 4 hours PRN Severe Pain (7 - 10)  ondansetron Injectable 4 milliGRAM(s) IV Push every 6 hours PRN Nausea and/or Vomiting      Vital Signs Last 24 Hrs  T(C): 37.3 (13 Apr 2023 13:29), Max: 37.3 (13 Apr 2023 13:29)  T(F): 99.2 (13 Apr 2023 13:29), Max: 99.2 (13 Apr 2023 13:29)  HR: 74 (13 Apr 2023 13:29) (74 - 81)  BP: 101/64 (13 Apr 2023 13:29) (101/64 - 129/76)  BP(mean): --  RR: 18 (13 Apr 2023 13:29) (18 - 18)  SpO2: 94% (13 Apr 2023 13:29) (94% - 96%)    Parameters below as of 13 Apr 2023 13:29  Patient On (Oxygen Delivery Method): room air      COVID-19 PCR: NotDetec (10 Apr 2023 10:10)    LABS: Reviewed    CAPILLARY BLOOD GLUCOSE    COVID-19 PCR: NotDetec (10 Apr 2023 10:10)    Radiology: Reviewed.   < from: CT Head No Cont (01.29.20 @ 14:43) >  EXAM:  CT BRAIN                            PROCEDURE DATE:  01/29/2020          INTERPRETATION:  .    CLINICAL INFORMATION: headache/ dizziness    TECHNIQUE: Multiple axial CT images of the head were obtained without contrast. Sagittal and coronal reconstructed images were acquired from the source data.    COMPARISON: No prior CT studies of the brain are available for comparison at this institution.    FINDINGS: There is no acute intracranial hemorrhage, mass effect, shift of the midline structures, herniation, extra-axial fluid collection, or hydrocephalus.    The paranasal sinuses are clear. There is complete opacification of the right-sided mastoid air cells along with opacification of the middle ear cavity. There is decreased pneumatization of the bilateral mastoid tips with associated sclerosis. The orbits are unremarkable. The calvarium is intact.    IMPRESSION: No acute intracranial hemorrhage, mass effect, or shift of the midline structures.    Findings which may reflect chronicright-sided mastoiditis. Clinical correlation is required.                ELENA AMEZQUITA M.D., ATTENDING RADIOLOGIST  This document has been electronically signed. Jan 29 2020  2:51PM              < end of copied text >      ORT Score -   Family Hx of substance abuse	Female	      Male  Alcohol 	                                           1                     3  Illegal drugs	                                   2                     3  Rx drugs                                           4 	                  4  Personal Hx of substance abuse		  Alcohol 	                                          3	                  3  Illegal drugs                                     4	                  4  Rx drugs                                            5 	                  5  Age between 16- 45 years	           1                     1  hx preadolescent sexual abuse	   3 	                  0  Psychological disease		  ADD, OCD, bipolar, schizophrenia   2	          2  Depression                                           1 	          1  Total: 0    a score of 3 or lower indicates low risk for opioid abuse		  a score of 4-7 indicates moderate risk for opioid abuse		  a score of 8 or higher indicates high risk for opioid abuse    REVIEW OF SYSTEMS:  CONSTITUTIONAL: No fever or fatigue  HEENT:  No difficulty hearing, no change in vision  NECK: No pain or stiffness  RESPIRATORY: No cough, wheezing, chills or hemoptysis; No shortness of breath  CARDIOVASCULAR: No chest pain, palpitations, dizziness, or leg swelling  GASTROINTESTINAL: No loss of appetite, decreased PO intake. No abdominal or epigastric pain. No nausea no vomiting; No diarrhea or constipation. +hx GERD,   GENITOURINARY: No dysuria, frequency, hematuria, retention or incontinence  MUSCULOSKELETAL: + left lumbar back pain; No joint swelling; no upper or lower motor strength weakness, no saddle anesthesia, bowel/bladder incontinence, no falls   NEURO: No headaches, No numbness/tingling b/l LE, No weakness    PHYSICAL EXAM:  GENERAL:  Alert & Oriented X4, cooperative, NAD, Good concentration. Speech is clear.   RESPIRATORY: Respirations even and unlabored. Clear to auscultation bilaterally; No rales, rhonchi, wheezing, or rubs   CARDIOVASCULAR: Normal S1/S2, regular rate and rhythm; No murmurs, rubs, or gallops. No JVD.   GASTROINTESTINAL: + obese per BMI, Soft, Nontender, Nondistended; Bowel sounds present  PERIPHERAL VASCULAR:  Extremities warm without edema. 2+ Peripheral Pulses, No cyanosis, No calf tenderness  MUSCULOSKELETAL: Motor Strength 5/5 B/L upper and 4/5 lower extremities; ROM intact. + left lumbar back tenderness on palpation.   SKIN: Warm, dry, intact. No rashes, lesions, scars. +lumbar back surgical gauze dressing x 2 c/d/i    Risk factors associated with adverse outcomes related to opioid treatment  [ ]  Concurrent benzodiazepine use  [ ]  History/ Active substance use or alcohol use disorder  [ ] Psychiatric co-morbidity  [X] Sleep apnea  [ ] COPD  [X ] BMI> 35  [ ] Liver dysfunction  [ ] Renal dysfunction  [ ] CHF  [ ] Smoker  [X ]  Age > 60 years    [X ]  [place]  Reviewed and Copied to Chart. See below.    Plan of care and goal oriented pain management treatment options were discussed with patient and /or primary care giver; all questions and concerns were addressed and care was aligned with patient's wishes.    Educated patient on goal oriented pain management treatment options     04-13-23 @ 13:52

## 2023-04-13 NOTE — PROGRESS NOTE ADULT - SUBJECTIVE AND OBJECTIVE BOX
Pt Name: ABEBA SCHULTZ  MRN: [id number]        Orthopedic diagnosis:  - S/p L4-5 lami and fusion POD#3    24 hr interval:  - c/o mod-severe LBP    Patient seen and evaluated at bedside.   Patient with complaints of pain localized to lower back, rated a 6/10 in severity non radiating, denies paresthesias.  Patient reports ambulating around the unit yesterday with PT.   Admits to voiding independently, denies any BM.    [  DENIES   ] bladder/bowel incontinence or changes  [  DENIES   ] saddle-like paresthesias  denies cp/sob/palpitations      PHYSICAL EXAM:  General; Awake and alert, Oriented x 3  Hips/Pelvis:   Able to SLR bilaterally. Negative log roll, heel strike. No pain on passive Int/Ext hip rotation.  Spine exam:  Back:   Extremities:          -Left Upper Extremity: Atraumatic with normal alignment NROM. No crepitus. No bony tenderness.      -Right Upper Extremity: Atraumatic with normal alignment NROM. No crepitus. No bony tenderness.      -Left Lower Extremity: Atraumatic with normal alignment NROM. No crepitus. No bony tenderness. No calf tenderness, Calf soft.     -Right Lower Extremity: Atraumatic with normal alignment NROM. No crepitus. No bony tenderness.  No calf tenderness, Calf soft.         >Sensation:  intact to light touch           >Motor exam:          >Bilateral Upper extremities    Delt      Bicep      Tri      Wrist ext  Wrst Flex       Digit Ext Digit Flex                                                   R         5/5        5/5        5/5       5/5            5/5            5/5       5/5          5/5                                                   L          5/5        5/5        5/5       5/5            5/5            5/5       5/5          5/5         >Bilateral Lower ext.     Hip Flx  Hip Ext    Quad   Hamstrg   TA       EHL      GS                                          R        5/5        5/5        5/5        5/5          5/5     5/5      5/5                                          L         4/5        5/5        4/5        5/5          5/5     5/5      5/5      A: Pt is a  67y Male s/p L4-5 Laminectomy and fusion pod#3    Plan:  - Recommendation    > Pain control today- increased Tramadol to 75mg po q6hr prn  -  DVT prophylaxis  -  Daily PT- WBAT of the lower ext, proper body mechanics    > Spine precautions - no bending, twisting, lifting   -  For Home discharge pending improved pain control   -  Case d/w

## 2023-04-13 NOTE — PROGRESS NOTE ADULT - PROBLEM SELECTOR PLAN 1
Pt with acute postop lumbar back pain greatest over left lumbar back which is somatic and neuropathic in nature due to s/p L4-L5 laminectomy and fusion with posterior instrumentation on 4/10, POD #3.   Opioid pain recommendations   - Discontinued oxycodone 4/11.  - Discontinued dilaudid 4/12.   - Discontinued Tramadol 4/13.  - Start morphine 7.5mg PO q4h PRN severe pain.  Non-opioid pain recommendations   - NSIADs per surgical team   - Continue Acetaminophen 1 gram PO q 8 hours x 3 days, then PRN moderate pain. Monitor LFTs  - Continue Gabapentin 300mg po q 8 hours. Monitor renal function.   - Continue methocarbamol 750mg PO TID per neurosurgery.   Bowel Regimen  - Continue Miralax 17G PO daily  - Continue Senna 2 tablets at bedtime for constipation  Mild pain   - Non-pharmacological pain treatment recommendations  - Warm/ Cool packs PRN   - Repositioning, imagery, relaxation, distraction.  - Physical therapy OOB if no contraindications   Recommendations discussed with primary team and RN. Pt with acute postop lumbar back pain greatest over left lumbar back which is somatic and neuropathic in nature due to s/p L4-L5 laminectomy and fusion with posterior instrumentation on 4/10, POD #3.   Opioid pain recommendations   - Discontinued oxycodone 4/11.  - Discontinued dilaudid 4/12.   - Discontinued Tramadol 4/13.  - Start morphine 15mg PO q4h PRN severe pain.  Non-opioid pain recommendations   - NSIADs per surgical team   - Continue Acetaminophen 1 gram PO q 8 hours x 3 days, then PRN moderate pain. Monitor LFTs  - Continue Gabapentin 300mg po q 8 hours. Monitor renal function.   - Continue methocarbamol 750mg PO TID per neurosurgery.   Bowel Regimen  - Continue Miralax 17G PO daily  - Continue Senna 2 tablets at bedtime for constipation  Mild pain   - Non-pharmacological pain treatment recommendations  - Warm/ Cool packs PRN   - Repositioning, imagery, relaxation, distraction.  - Physical therapy OOB if no contraindications   Recommendations discussed with primary team and RN.

## 2023-04-13 NOTE — PROGRESS NOTE ADULT - SUBJECTIVE AND OBJECTIVE BOX
pt seen in icu [  ], reg med floor [ x  ], bed [ x ], chair at bedside [   ]  Awake, alert, comfortable out of bed in NAD. Complaining of pain on surgical site  REVIEW OF SYSTEMS:    CONSTITUTIONAL: No weakness, fevers or chills  EYES/ENT: No visual changes;  No vertigo or throat pain   NECK: No pain or stiffness  RESPIRATORY: No cough, wheezing, hemoptysis; No shortness of breath  CARDIOVASCULAR: No chest pain or palpitations  GASTROINTESTINAL: No abdominal or epigastric pain. No nausea, vomiting, or hematemesis; No diarrhea or constipation. No melena or hematochezia.  GENITOURINARY: No dysuria, frequency or hematuria  NEUROLOGICAL: No numbness or weakness  SKIN: No itching, burning, rashes, or lesions   All other review of systems is negative unless indicated above.    Physical Exam    General: WN/WD NAD  Neurology: A&Ox3, nonfocal, KONG x 4  Respiratory: CTA B/L  CV: RRR, S1S2, no murmurs, rubs or gallops  Abdominal: Soft, NT, ND +BS, Last BM  Extremities: No edema, + peripheral pulses      Allergies  No Known Allergies      Health Issues  Dorsalgia    Borderline hypertension    HLD (hyperlipidemia)    Obstructive sleep apnea on C PAP    Migraine headache    Bilateral otitis media    HTN (hypertension)    DVT (deep venous thrombosis)    HLD (hyperlipidemia)    Vertigo    Chronic mastoiditis    Unspecified cholesteatoma, right ear    Otalgia, right ear    HTN (hypertension)    Poor historian    Renal cyst, left    Prediabetes    Lumbar herniated disc    History of umbilical hernia repair    Hx of umbilical hernia repair 2010    History of weight loss surgery    S/P shoulder surgery    S/P UPPP (uvulopalatopharyngoplasty)    H/O prostate biopsy        Vitals  T(F): 98.6 (04-13-23 @ 05:52), Max: 98.8 (04-12-23 @ 20:50)  HR: 76 (04-13-23 @ 05:52) (76 - 81)  BP: 110/65 (04-13-23 @ 05:52) (110/65 - 129/76)  RR: 18 (04-13-23 @ 05:52) (18 - 18)  SpO2: 96% (04-13-23 @ 05:52) (94% - 96%)  Wt(kg): --  CAPILLARY BLOOD GLUCOSE          Labs                      Radiology Results      Meds    MEDICATIONS  (STANDING):  acetaminophen     Tablet .. 1000 milliGRAM(s) Oral every 8 hours  gabapentin 300 milliGRAM(s) Oral every 8 hours  magnesium hydroxide Suspension 30 milliLiter(s) Oral daily  methocarbamol 750 milliGRAM(s) Oral every 8 hours  pantoprazole  Injectable 40 milliGRAM(s) IV Push daily  polyethylene glycol 3350 17 Gram(s) Oral daily  senna 2 Tablet(s) Oral at bedtime      MEDICATIONS  (PRN):  ondansetron Injectable 4 milliGRAM(s) IV Push every 6 hours PRN Nausea and/or Vomiting  traMADol 75 milliGRAM(s) Oral every 6 hours PRN Severe Pain (7 - 10)

## 2023-04-14 ENCOUNTER — TRANSCRIPTION ENCOUNTER (OUTPATIENT)
Age: 68
End: 2023-04-14

## 2023-04-14 LAB
APTT BLD: 30 SEC — SIGNIFICANT CHANGE UP (ref 27.5–35.5)
D DIMER BLD IA.RAPID-MCNC: 6790 NG/ML DDU — HIGH
HCT VFR BLD CALC: 48 % — SIGNIFICANT CHANGE UP (ref 39–50)
HGB BLD-MCNC: 15.7 G/DL — SIGNIFICANT CHANGE UP (ref 13–17)
INR BLD: 1.05 RATIO — SIGNIFICANT CHANGE UP (ref 0.88–1.16)
MCHC RBC-ENTMCNC: 30.6 PG — SIGNIFICANT CHANGE UP (ref 27–34)
MCHC RBC-ENTMCNC: 32.7 GM/DL — SIGNIFICANT CHANGE UP (ref 32–36)
MCV RBC AUTO: 93.6 FL — SIGNIFICANT CHANGE UP (ref 80–100)
NRBC # BLD: 0 /100 WBCS — SIGNIFICANT CHANGE UP (ref 0–0)
PLATELET # BLD AUTO: 152 K/UL — SIGNIFICANT CHANGE UP (ref 150–400)
PROTHROM AB SERPL-ACNC: 12.5 SEC — SIGNIFICANT CHANGE UP (ref 10.5–13.4)
RBC # BLD: 5.13 M/UL — SIGNIFICANT CHANGE UP (ref 4.2–5.8)
RBC # FLD: 12.9 % — SIGNIFICANT CHANGE UP (ref 10.3–14.5)
WBC # BLD: 6.22 K/UL — SIGNIFICANT CHANGE UP (ref 3.8–10.5)
WBC # FLD AUTO: 6.22 K/UL — SIGNIFICANT CHANGE UP (ref 3.8–10.5)

## 2023-04-14 PROCEDURE — 72100 X-RAY EXAM L-S SPINE 2/3 VWS: CPT | Mod: 26

## 2023-04-14 PROCEDURE — 93971 EXTREMITY STUDY: CPT | Mod: 26,RT

## 2023-04-14 PROCEDURE — 99232 SBSQ HOSP IP/OBS MODERATE 35: CPT

## 2023-04-14 RX ORDER — SENNA PLUS 8.6 MG/1
2 TABLET ORAL
Qty: 14 | Refills: 0
Start: 2023-04-14 | End: 2023-04-20

## 2023-04-14 RX ORDER — APIXABAN 2.5 MG/1
2.5 TABLET, FILM COATED ORAL
Refills: 0 | Status: ACTIVE | OUTPATIENT
Start: 2023-04-14 | End: 2023-04-21

## 2023-04-14 RX ORDER — ASPIRIN/CALCIUM CARB/MAGNESIUM 324 MG
0 TABLET ORAL
Qty: 0 | Refills: 0 | DISCHARGE

## 2023-04-14 RX ORDER — TRAMADOL HYDROCHLORIDE 50 MG/1
1 TABLET ORAL
Qty: 28 | Refills: 0
Start: 2023-04-14 | End: 2023-04-20

## 2023-04-14 RX ORDER — GABAPENTIN 400 MG/1
1 CAPSULE ORAL
Qty: 21 | Refills: 0
Start: 2023-04-14 | End: 2023-04-20

## 2023-04-14 RX ORDER — ENOXAPARIN SODIUM 100 MG/ML
30 INJECTION SUBCUTANEOUS EVERY 12 HOURS
Refills: 0 | Status: DISCONTINUED | OUTPATIENT
Start: 2023-04-14 | End: 2023-04-14

## 2023-04-14 RX ORDER — LACTULOSE 10 G/15ML
10 SOLUTION ORAL ONCE
Refills: 0 | Status: DISCONTINUED | OUTPATIENT
Start: 2023-04-14 | End: 2023-04-14

## 2023-04-14 RX ORDER — TRAMADOL HYDROCHLORIDE 50 MG/1
1 TABLET ORAL
Qty: 0 | Refills: 0 | DISCHARGE

## 2023-04-14 RX ORDER — METHOCARBAMOL 500 MG/1
1 TABLET, FILM COATED ORAL
Qty: 21 | Refills: 0
Start: 2023-04-14 | End: 2023-04-20

## 2023-04-14 RX ADMIN — GABAPENTIN 300 MILLIGRAM(S): 400 CAPSULE ORAL at 21:08

## 2023-04-14 RX ADMIN — MORPHINE SULFATE 15 MILLIGRAM(S): 50 CAPSULE, EXTENDED RELEASE ORAL at 14:35

## 2023-04-14 RX ADMIN — POLYETHYLENE GLYCOL 3350 17 GRAM(S): 17 POWDER, FOR SOLUTION ORAL at 13:19

## 2023-04-14 RX ADMIN — MORPHINE SULFATE 15 MILLIGRAM(S): 50 CAPSULE, EXTENDED RELEASE ORAL at 15:30

## 2023-04-14 RX ADMIN — METHOCARBAMOL 750 MILLIGRAM(S): 500 TABLET, FILM COATED ORAL at 05:28

## 2023-04-14 RX ADMIN — MORPHINE SULFATE 15 MILLIGRAM(S): 50 CAPSULE, EXTENDED RELEASE ORAL at 11:23

## 2023-04-14 RX ADMIN — PANTOPRAZOLE SODIUM 40 MILLIGRAM(S): 20 TABLET, DELAYED RELEASE ORAL at 13:18

## 2023-04-14 RX ADMIN — MAGNESIUM HYDROXIDE 30 MILLILITER(S): 400 TABLET, CHEWABLE ORAL at 18:57

## 2023-04-14 RX ADMIN — Medication 1000 MILLIGRAM(S): at 21:33

## 2023-04-14 RX ADMIN — Medication 1000 MILLIGRAM(S): at 14:15

## 2023-04-14 RX ADMIN — MORPHINE SULFATE 15 MILLIGRAM(S): 50 CAPSULE, EXTENDED RELEASE ORAL at 18:56

## 2023-04-14 RX ADMIN — GABAPENTIN 300 MILLIGRAM(S): 400 CAPSULE ORAL at 05:27

## 2023-04-14 RX ADMIN — MORPHINE SULFATE 15 MILLIGRAM(S): 50 CAPSULE, EXTENDED RELEASE ORAL at 20:00

## 2023-04-14 RX ADMIN — Medication 1000 MILLIGRAM(S): at 06:31

## 2023-04-14 RX ADMIN — MORPHINE SULFATE 15 MILLIGRAM(S): 50 CAPSULE, EXTENDED RELEASE ORAL at 06:31

## 2023-04-14 RX ADMIN — Medication 1000 MILLIGRAM(S): at 21:07

## 2023-04-14 RX ADMIN — METHOCARBAMOL 750 MILLIGRAM(S): 500 TABLET, FILM COATED ORAL at 21:07

## 2023-04-14 RX ADMIN — SENNA PLUS 2 TABLET(S): 8.6 TABLET ORAL at 21:07

## 2023-04-14 RX ADMIN — Medication 1000 MILLIGRAM(S): at 13:19

## 2023-04-14 RX ADMIN — MORPHINE SULFATE 15 MILLIGRAM(S): 50 CAPSULE, EXTENDED RELEASE ORAL at 05:27

## 2023-04-14 RX ADMIN — GABAPENTIN 300 MILLIGRAM(S): 400 CAPSULE ORAL at 13:19

## 2023-04-14 RX ADMIN — MORPHINE SULFATE 15 MILLIGRAM(S): 50 CAPSULE, EXTENDED RELEASE ORAL at 22:57

## 2023-04-14 RX ADMIN — APIXABAN 2.5 MILLIGRAM(S): 2.5 TABLET, FILM COATED ORAL at 18:56

## 2023-04-14 RX ADMIN — MORPHINE SULFATE 15 MILLIGRAM(S): 50 CAPSULE, EXTENDED RELEASE ORAL at 10:34

## 2023-04-14 RX ADMIN — METHOCARBAMOL 750 MILLIGRAM(S): 500 TABLET, FILM COATED ORAL at 13:19

## 2023-04-14 RX ADMIN — Medication 1000 MILLIGRAM(S): at 05:27

## 2023-04-14 NOTE — PROGRESS NOTE ADULT - PROBLEM SELECTOR PLAN 1
Pt with acute postop lumbar back pain greatest over left lumbar back which is somatic and neuropathic in nature due to s/p L4-L5 laminectomy and fusion with posterior instrumentation on 4/10, POD #4.   Opioid pain recommendations   - Discontinued oxycodone 4/11.  - Discontinued dilaudid 4/12.   - Discontinued Tramadol 4/13.  - Continue morphine 15mg PO q4h PRN severe pain.  Non-opioid pain recommendations   - NSIADs per surgical team   - Continue Acetaminophen 1 gram PO q 8 hours x 3 days, then PRN moderate pain. Monitor LFTs  - Continue Gabapentin 300mg po q 8 hours. Monitor renal function.   - Continue methocarbamol 750mg PO TID per neurosurgery.   Bowel Regimen  - Continue Miralax 17G PO daily  - Continue Senna 2 tablets at bedtime for constipation  Mild pain   - Non-pharmacological pain treatment recommendations  - Warm/ Cool packs PRN   - Repositioning, imagery, relaxation, distraction.  - Physical therapy OOB if no contraindications   Recommendations discussed with primary team and RN.

## 2023-04-14 NOTE — DISCHARGE NOTE PROVIDER - PROVIDER TOKENS
PROVIDER:[TOKEN:[[id number]],FOLLOWUP:[2 weeks]] PROVIDER:[TOKEN:[[id number]],FOLLOWUP:[2 weeks]],PROVIDER:[TOKEN:[[id number]],FOLLOWUP:[2 weeks]],PROVIDER:[TOKEN:[[id number]]]

## 2023-04-14 NOTE — DISCHARGE NOTE NURSING/CASE MANAGEMENT/SOCIAL WORK - PATIENT PORTAL LINK FT
You can access the FollowMyHealth Patient Portal offered by [facility] by registering at the following website: [email or web address]. By joining [employer]’s FollowMyHealth portal, you will also be able to view your health information using other applications (apps) compatible with our system.

## 2023-04-14 NOTE — PROGRESS NOTE ADULT - SUBJECTIVE AND OBJECTIVE BOX
pt seen in icu [  ], reg med floor [  x ], bed [ x ], chair at bedside [   ]  Awake, alert, comfortable out of bed in NAD  REVIEW OF SYSTEMS:    CONSTITUTIONAL: No weakness, fevers or chills  EYES/ENT: No visual changes;  No vertigo or throat pain   NECK: No pain or stiffness  RESPIRATORY: No cough, wheezing, hemoptysis; No shortness of breath  CARDIOVASCULAR: No chest pain or palpitations  GASTROINTESTINAL: No abdominal or epigastric pain. No nausea, vomiting, or hematemesis; No diarrhea or constipation. No melena or hematochezia.  GENITOURINARY: No dysuria, frequency or hematuria  NEUROLOGICAL: No numbness or weakness  SKIN: No itching, burning, rashes, or lesions   All other review of systems is negative unless indicated above.    Physical Exam    General: WN/WD NAD  Neurology: A&Ox3, nonfocal, KONG x 4  Respiratory: CTA B/L  CV: RRR, S1S2, no murmurs, rubs or gallops  Abdominal: Soft, NT, ND +BS, Last BM  Extremities: No edema, + peripheral pulses      Allergies  No Known Allergies      Health Issues  Dorsalgia    Borderline hypertension    HLD (hyperlipidemia)    Obstructive sleep apnea on C PAP    Migraine headache    Bilateral otitis media    HTN (hypertension)    DVT (deep venous thrombosis)    HLD (hyperlipidemia)    Vertigo    Chronic mastoiditis    Unspecified cholesteatoma, right ear    Otalgia, right ear    HTN (hypertension)    Poor historian    Renal cyst, left    Prediabetes    Lumbar herniated disc    History of umbilical hernia repair    Hx of umbilical hernia repair 2010    History of weight loss surgery    S/P shoulder surgery    S/P UPPP (uvulopalatopharyngoplasty)    H/O prostate biopsy        Vitals  T(F): 97.9 (04-14-23 @ 05:57), Max: 99.2 (04-13-23 @ 13:29)  HR: 58 (04-14-23 @ 05:57) (58 - 74)  BP: 122/72 (04-14-23 @ 05:57) (101/64 - 122/72)  RR: 18 (04-14-23 @ 05:57) (18 - 18)  SpO2: 97% (04-14-23 @ 05:57) (94% - 97%)  Wt(kg): --  CAPILLARY BLOOD GLUCOSE          Labs                      Radiology Results      Meds    MEDICATIONS  (STANDING):  acetaminophen     Tablet .. 1000 milliGRAM(s) Oral every 8 hours  gabapentin 300 milliGRAM(s) Oral every 8 hours  lactulose Syrup 10 Gram(s) Oral once  magnesium hydroxide Suspension 30 milliLiter(s) Oral daily  methocarbamol 750 milliGRAM(s) Oral every 8 hours  pantoprazole  Injectable 40 milliGRAM(s) IV Push daily  polyethylene glycol 3350 17 Gram(s) Oral daily  saline laxative (FLEET) Rectal Enema 1 Enema Rectal once  senna 2 Tablet(s) Oral at bedtime      MEDICATIONS  (PRN):  bisacodyl Suppository 10 milliGRAM(s) Rectal daily PRN Constipation  morphine  IR 15 milliGRAM(s) Oral every 4 hours PRN Severe Pain (7 - 10)  ondansetron Injectable 4 milliGRAM(s) IV Push every 6 hours PRN Nausea and/or Vomiting

## 2023-04-14 NOTE — PROGRESS NOTE ADULT - ASSESSMENT
Data Detail Level: Printer-Friendly View Extended View  Confidential Drug Utilization Report  Search Terms: Andrez Thomas, 1955Search Date: 04/11/2023 08:38:54 AM  The Drug Utilization Report below displays all of the controlled substance prescriptions, if any, that your patient has filled in the last twelve months. The information displayed on this report is compiled from pharmacy submissions to the Department, and accurately reflects the information as submitted by the pharmacies.    This report was requested by: Lisa Connelly | Reference #: [id number]    There are no results for the search terms that you entered.

## 2023-04-14 NOTE — DISCHARGE NOTE PROVIDER - CARE PROVIDER_API CALL
Randell Rivas (MD; MILADIS; MS)  Neurosurgery  [address], [address]  [place] [address]  Phone: [phone]  Fax: [phone]  Follow Up Time: 2 weeks   Randell Rivas; MILADIS; MS)  Neurosurgery  [address], [address]  [place] [address]  Phone: [phone]  Fax: [phone]  Follow Up Time: 2 weeks    Pierce Rivero)  Internal Medicine; Pulmonary Disease  [address] [place] [address]  Phone: [phone]  Fax: [phone]  Follow Up Time: 2 weeks    Gay Ramirez)  Internal Medicine  [address] [place] [address]  Phone: [phone]  Fax: [phone]  Follow Up Time:

## 2023-04-14 NOTE — DISCHARGE NOTE PROVIDER - NSDCCPCAREPLAN_GEN_ALL_CORE_FT
PRINCIPAL DISCHARGE DIAGNOSIS  Diagnosis: Lumbar disc herniation  Assessment and Plan of Treatment: Pain Management- See Attached Medication Reconciliation  Weight Bearing Status: Weight bearing as tolerated  > No bending, twisting, heavy lifting   Equipment needs: Commode, Walker  Dressing: Please keep bandage/dressing Clean, Dry, and Intact.  Incision Site:   Absorbable sutures were placed under the skin.  No suture removal required   PT/Occupational Therapy are Activities of Daily Living as appropriate  Follow up with Neurosurgeon, Dr. Rivas in office in two weeks     PRINCIPAL DISCHARGE DIAGNOSIS  Diagnosis: Lumbar disc herniation  Assessment and Plan of Treatment: Pain Management- See Attached Medication Reconciliation  Weight Bearing Status: Weight bearing as tolerated  > No bending, twisting, heavy lifting   Equipment needs: Commode, Walker  Dressing: Please keep bandage/dressing Clean, Dry, and Intact.  Incision Site:   Absorbable sutures were placed under the skin.  No suture removal required   PT/Occupational Therapy are Activities of Daily Living as appropriate  Follow up with Neurosurgeon, Dr. Rivas in office in two weeks      SECONDARY DISCHARGE DIAGNOSES  Diagnosis: DVT, lower extremity  Assessment and Plan of Treatment: > Take Eliquis 2.5mg twice a day for 3 weeks  > After you complete the initial 3 week course, Contniue on Eliquis 5mg twice a day for 2 months    Diagnosis: HTN (hypertension)  Assessment and Plan of Treatment: Continue with home meds    Diagnosis: Bradycardia  Assessment and Plan of Treatment: Bradycardia likely secondary to HAMIDA  - Follow up with Cardiologist or PCP upon discharge    Diagnosis: HAMIDA on CPAP  Assessment and Plan of Treatment: Continue with CPAP at home during the night  - Follow up with Pulmonologist in office upon discharge

## 2023-04-14 NOTE — DISCHARGE NOTE PROVIDER - HOSPITAL COURSE
Patient is a 66 y/o M s/p L4-5 lumbar laminectomy with posterior fusion. Patient with no acute post op complications. Patient received physical therapy and pain management throughout hospital stay. Patient is a 68 y/o M s/p L4-5 lumbar laminectomy with posterior fusion. Patients hospital course was complicated by a right lower extremity DVT. Patient was placed on Eliquis 2.5mg twice a day for DVT treatment. Patient had episodes of bradycardia overnight. Cardiology, Dr. Ramirez was consulted who recommended Echocardiogram as an outpatient.

## 2023-04-14 NOTE — PROGRESS NOTE ADULT - SUBJECTIVE AND OBJECTIVE BOX
Source of information: ABEBA SCHULTZ, Chart review  Patient language: Citizen of [place]  : Suzanna [id number], Pt also understands and communicates in basic English     HPI:      Patient is a 67y old  Male with PMH HTN HLD obesity, HAMIDA, HTN lumbar disc herniation, lumbar stenosis, lumbar radiculopathy who presents for a scheduled lumbar laminectomy. Pt s/p L4-L5 laminectomy and fusion with posterior instrumentation on 4/10, POD #4. Pain consulted for postop back pain. Pt seen and examined at bedside. Pt sitting in chair, reports lumbar back pain greatest over left lumbar back, reports pain score 7/10 and tolerable with current pain regimen SCALE USED: (1-10 VNRS). Pt describes pain as constant, throbbing, sharp, radiating to right hip, alleviated by current PO pain medication, exacerbated by movement. Trailed oxycodone 10mg PO, Dilaudid 4mg PO and tramadol 75mg PO PRN without effective pain relief. Now requesting PO morphine. PO Pt tolerating PO diet. Denies lethargy, chest pain, SOB, nausea, vomiting, constipation. Reports last BM 4/14. Patient stated goal for pain control: to be able to take deep breaths, get out of bed to chair and ambulate with tolerable pain control. Reports ambulating in room. Pt denies taking medications for pain at home. Plan to be discharged home today.     PAST MEDICAL & SURGICAL HISTORY:  HLD (hyperlipidemia)      Obstructive sleep apnea on C PAP  no longer using CPAP machine      Migraine headache      Bilateral otitis media      HTN (hypertension)      DVT (deep venous thrombosis)  LLE in 2019 treated with eliquis for 6 months      HLD (hyperlipidemia)      Vertigo      Chronic mastoiditis  right ear      Unspecified cholesteatoma, right ear      Otalgia, right ear      HTN (hypertension)      Poor historian      Renal cyst, left      Prediabetes  pt states A1c is 6. denies current use of meds      Lumbar herniated disc      History of umbilical hernia repair  x2      History of weight loss surgery  5 years ago in [place]      S/P shoulder surgery  left shoulder      S/P UPPP (uvulopalatopharyngoplasty)  >5 years ago      H/O prostate biopsy          FAMILY HISTORY:  FH: asthma  daughter, brother        Social History:   [X ] Denies ETOH use, illicit drug use and smoking    Allergies    No Known Allergies    MEDICATIONS  (STANDING):  acetaminophen     Tablet .. 1000 milliGRAM(s) Oral every 8 hours  gabapentin 300 milliGRAM(s) Oral every 8 hours  magnesium hydroxide Suspension 30 milliLiter(s) Oral daily  methocarbamol 750 milliGRAM(s) Oral every 8 hours  pantoprazole  Injectable 40 milliGRAM(s) IV Push daily  polyethylene glycol 3350 17 Gram(s) Oral daily  senna 2 Tablet(s) Oral at bedtime    MEDICATIONS  (PRN):  morphine  IR 15 milliGRAM(s) Oral every 4 hours PRN Severe Pain (7 - 10)  ondansetron Injectable 4 milliGRAM(s) IV Push every 6 hours PRN Nausea and/or Vomiting      Vital Signs Last 24 Hrs  T(C): 36.6 (14 Apr 2023 12:33), Max: 36.8 (13 Apr 2023 20:50)  T(F): 97.9 (14 Apr 2023 12:33), Max: 98.2 (13 Apr 2023 20:50)  HR: 58 (14 Apr 2023 12:33) (58 - 68)  BP: 122/68 (14 Apr 2023 12:33) (121/60 - 122/72)  BP(mean): --  RR: 16 (14 Apr 2023 12:33) (16 - 18)  SpO2: 92% (14 Apr 2023 12:33) (92% - 97%)    Parameters below as of 14 Apr 2023 12:33  Patient On (Oxygen Delivery Method): room air      COVID-19 PCR: NotDetec (10 Apr 2023 10:10)    LABS: Reviewed    CAPILLARY BLOOD GLUCOSE    COVID-19 PCR: NotDetec (10 Apr 2023 10:10)    Radiology: Reviewed.   < from: CT Head No Cont (01.29.20 @ 14:43) >  EXAM:  CT BRAIN                            PROCEDURE DATE:  01/29/2020          INTERPRETATION:  .    CLINICAL INFORMATION: headache/ dizziness    TECHNIQUE: Multiple axial CT images of the head were obtained without contrast. Sagittal and coronal reconstructed images were acquired from the source data.    COMPARISON: No prior CT studies of the brain are available for comparison at this institution.    FINDINGS: There is no acute intracranial hemorrhage, mass effect, shift of the midline structures, herniation, extra-axial fluid collection, or hydrocephalus.    The paranasal sinuses are clear. There is complete opacification of the right-sided mastoid air cells along with opacification of the middle ear cavity. There is decreased pneumatization of the bilateral mastoid tips with associated sclerosis. The orbits are unremarkable. The calvarium is intact.    IMPRESSION: No acute intracranial hemorrhage, mass effect, or shift of the midline structures.    Findings which may reflect chronicright-sided mastoiditis. Clinical correlation is required.                ELENA AMEZQUITA M.D., ATTENDING RADIOLOGIST  This document has been electronically signed. Jan 29 2020  2:51PM              < end of copied text >      ORT Score -   Family Hx of substance abuse	Female	      Male  Alcohol 	                                           1                     3  Illegal drugs	                                   2                     3  Rx drugs                                           4 	                  4  Personal Hx of substance abuse		  Alcohol 	                                          3	                  3  Illegal drugs                                     4	                  4  Rx drugs                                            5 	                  5  Age between 16- 45 years	           1                     1  hx preadolescent sexual abuse	   3 	                  0  Psychological disease		  ADD, OCD, bipolar, schizophrenia   2	          2  Depression                                           1 	          1  Total: 0    a score of 3 or lower indicates low risk for opioid abuse		  a score of 4-7 indicates moderate risk for opioid abuse		  a score of 8 or higher indicates high risk for opioid abuse    REVIEW OF SYSTEMS:  CONSTITUTIONAL: No fever or fatigue  HEENT:  No difficulty hearing, no change in vision  NECK: No pain or stiffness  RESPIRATORY: No cough, wheezing, chills or hemoptysis; No shortness of breath  CARDIOVASCULAR: No chest pain, palpitations, dizziness, or leg swelling  GASTROINTESTINAL: No loss of appetite, decreased PO intake. No abdominal or epigastric pain. No nausea no vomiting; No diarrhea or constipation. +hx GERD,   GENITOURINARY: No dysuria, frequency, hematuria, retention or incontinence  MUSCULOSKELETAL: + left lumbar back pain; No joint swelling; no upper or lower motor strength weakness, no saddle anesthesia, bowel/bladder incontinence, no falls   NEURO: No headaches, No numbness/tingling b/l LE, No weakness    PHYSICAL EXAM:  GENERAL:  Alert & Oriented X4, cooperative, NAD, Good concentration. Speech is clear.   RESPIRATORY: Respirations even and unlabored. Clear to auscultation bilaterally; No rales, rhonchi, wheezing, or rubs   CARDIOVASCULAR: Normal S1/S2, regular rate and rhythm; No murmurs, rubs, or gallops. No JVD.   GASTROINTESTINAL: + obese per BMI, Soft, Nontender, Nondistended; Bowel sounds present  PERIPHERAL VASCULAR:  Extremities warm without edema. 2+ Peripheral Pulses, No cyanosis, No calf tenderness  MUSCULOSKELETAL: Motor Strength 5/5 B/L upper and 4/5 lower extremities; ROM intact. + left lumbar back tenderness on palpation.   SKIN: Warm, dry, intact. No rashes, lesions, scars. +lumbar back surgical gauze dressing x 2 c/d/i    Risk factors associated with adverse outcomes related to opioid treatment  [ ]  Concurrent benzodiazepine use  [ ]  History/ Active substance use or alcohol use disorder  [ ] Psychiatric co-morbidity  [X] Sleep apnea  [ ] COPD  [X ] BMI> 35  [ ] Liver dysfunction  [ ] Renal dysfunction  [ ] CHF  [ ] Smoker  [X ]  Age > 60 years    [X ]  [place]  Reviewed and Copied to Chart. See below.    Plan of care and goal oriented pain management treatment options were discussed with patient and /or primary care giver; all questions and concerns were addressed and care was aligned with patient's wishes.    Educated patient on goal oriented pain management treatment options     04-14-23 @ 13:51               Source of information: ABEBA SCHULTZ, Chart review  Patient language: [place]  : n/a Pt also understands and communicates in basic English     HPI:      Patient is a 67y old  Male with PMH HTN HLD obesity, HAMIDA, HTN lumbar disc herniation, lumbar stenosis, lumbar radiculopathy who presents for a scheduled lumbar laminectomy. Pt s/p L4-L5 laminectomy and fusion with posterior instrumentation on 4/10, POD #4. Pain consulted for postop back pain. Pt seen and examined at bedside. Pt sitting in chair, reports lumbar back pain greatest over left lumbar back, reports pain score 7/10 and tolerable with current pain regimen SCALE USED: (1-10 VNRS). Pt describes pain as constant, throbbing, sharp, radiating to right hip, alleviated by current PO pain medication, exacerbated by movement. Trailed oxycodone 10mg PO, Dilaudid 4mg PO and tramadol 75mg PO PRN without effective pain relief. Now requesting PO morphine. PO Pt tolerating PO diet. Denies lethargy, chest pain, SOB, nausea, vomiting, constipation. Reports last BM 4/14. Patient stated goal for pain control: to be able to take deep breaths, get out of bed to chair and ambulate with tolerable pain control. Reports ambulating in room. Pt denies taking medications for pain at home. Plan to be discharged home today.     PAST MEDICAL & SURGICAL HISTORY:  HLD (hyperlipidemia)      Obstructive sleep apnea on C PAP  no longer using CPAP machine      Migraine headache      Bilateral otitis media      HTN (hypertension)      DVT (deep venous thrombosis)  LLE in 2019 treated with eliquis for 6 months      HLD (hyperlipidemia)      Vertigo      Chronic mastoiditis  right ear      Unspecified cholesteatoma, right ear      Otalgia, right ear      HTN (hypertension)      Poor historian      Renal cyst, left      Prediabetes  pt states A1c is 6. denies current use of meds      Lumbar herniated disc      History of umbilical hernia repair  x2      History of weight loss surgery  5 years ago in [place]      S/P shoulder surgery  left shoulder      S/P UPPP (uvulopalatopharyngoplasty)  >5 years ago      H/O prostate biopsy          FAMILY HISTORY:  FH: asthma  daughter, brother        Social History:   [X ] Denies ETOH use, illicit drug use and smoking    Allergies    No Known Allergies    MEDICATIONS  (STANDING):  acetaminophen     Tablet .. 1000 milliGRAM(s) Oral every 8 hours  gabapentin 300 milliGRAM(s) Oral every 8 hours  magnesium hydroxide Suspension 30 milliLiter(s) Oral daily  methocarbamol 750 milliGRAM(s) Oral every 8 hours  pantoprazole  Injectable 40 milliGRAM(s) IV Push daily  polyethylene glycol 3350 17 Gram(s) Oral daily  senna 2 Tablet(s) Oral at bedtime    MEDICATIONS  (PRN):  morphine  IR 15 milliGRAM(s) Oral every 4 hours PRN Severe Pain (7 - 10)  ondansetron Injectable 4 milliGRAM(s) IV Push every 6 hours PRN Nausea and/or Vomiting      Vital Signs Last 24 Hrs  T(C): 36.6 (14 Apr 2023 12:33), Max: 36.8 (13 Apr 2023 20:50)  T(F): 97.9 (14 Apr 2023 12:33), Max: 98.2 (13 Apr 2023 20:50)  HR: 58 (14 Apr 2023 12:33) (58 - 68)  BP: 122/68 (14 Apr 2023 12:33) (121/60 - 122/72)  BP(mean): --  RR: 16 (14 Apr 2023 12:33) (16 - 18)  SpO2: 92% (14 Apr 2023 12:33) (92% - 97%)    Parameters below as of 14 Apr 2023 12:33  Patient On (Oxygen Delivery Method): room air      COVID-19 PCR: NotDetec (10 Apr 2023 10:10)    LABS: Reviewed    CAPILLARY BLOOD GLUCOSE    COVID-19 PCR: NotDetec (10 Apr 2023 10:10)    Radiology: Reviewed.   < from: CT Head No Cont (01.29.20 @ 14:43) >  EXAM:  CT BRAIN                            PROCEDURE DATE:  01/29/2020          INTERPRETATION:  .    CLINICAL INFORMATION: headache/ dizziness    TECHNIQUE: Multiple axial CT images of the head were obtained without contrast. Sagittal and coronal reconstructed images were acquired from the source data.    COMPARISON: No prior CT studies of the brain are available for comparison at this institution.    FINDINGS: There is no acute intracranial hemorrhage, mass effect, shift of the midline structures, herniation, extra-axial fluid collection, or hydrocephalus.    The paranasal sinuses are clear. There is complete opacification of the right-sided mastoid air cells along with opacification of the middle ear cavity. There is decreased pneumatization of the bilateral mastoid tips with associated sclerosis. The orbits are unremarkable. The calvarium is intact.    IMPRESSION: No acute intracranial hemorrhage, mass effect, or shift of the midline structures.    Findings which may reflect chronicright-sided mastoiditis. Clinical correlation is required.                ELENA AMEZQUITA M.D., ATTENDING RADIOLOGIST  This document has been electronically signed. Jan 29 2020  2:51PM              < end of copied text >      ORT Score -   Family Hx of substance abuse	Female	      Male  Alcohol 	                                           1                     3  Illegal drugs	                                   2                     3  Rx drugs                                           4 	                  4  Personal Hx of substance abuse		  Alcohol 	                                          3	                  3  Illegal drugs                                     4	                  4  Rx drugs                                            5 	                  5  Age between 16- 45 years	           1                     1  hx preadolescent sexual abuse	   3 	                  0  Psychological disease		  ADD, OCD, bipolar, schizophrenia   2	          2  Depression                                           1 	          1  Total: 0    a score of 3 or lower indicates low risk for opioid abuse		  a score of 4-7 indicates moderate risk for opioid abuse		  a score of 8 or higher indicates high risk for opioid abuse    REVIEW OF SYSTEMS:  CONSTITUTIONAL: No fever or fatigue  HEENT:  No difficulty hearing, no change in vision  NECK: No pain or stiffness  RESPIRATORY: No cough, wheezing, chills or hemoptysis; No shortness of breath  CARDIOVASCULAR: No chest pain, palpitations, dizziness, or leg swelling  GASTROINTESTINAL: No loss of appetite, decreased PO intake. No abdominal or epigastric pain. No nausea no vomiting; No diarrhea or constipation. +hx GERD,   GENITOURINARY: No dysuria, frequency, hematuria, retention or incontinence  MUSCULOSKELETAL: + left lumbar back pain; No joint swelling; no upper or lower motor strength weakness, no saddle anesthesia, bowel/bladder incontinence, no falls   NEURO: No headaches, No numbness/tingling b/l LE, No weakness    PHYSICAL EXAM:  GENERAL:  Alert & Oriented X4, cooperative, NAD, Good concentration. Speech is clear.   RESPIRATORY: Respirations even and unlabored. Clear to auscultation bilaterally; No rales, rhonchi, wheezing, or rubs   CARDIOVASCULAR: Normal S1/S2, regular rate and rhythm; No murmurs, rubs, or gallops. No JVD.   GASTROINTESTINAL: + obese per BMI, Soft, Nontender, Nondistended; Bowel sounds present  PERIPHERAL VASCULAR:  Extremities warm without edema. 2+ Peripheral Pulses, No cyanosis, No calf tenderness  MUSCULOSKELETAL: Motor Strength 5/5 B/L upper and 4/5 lower extremities; ROM intact. + left lumbar back tenderness on palpation.   SKIN: Warm, dry, intact. No rashes, lesions, scars. +lumbar back surgical gauze dressing x 2 c/d/i    Risk factors associated with adverse outcomes related to opioid treatment  [ ]  Concurrent benzodiazepine use  [ ]  History/ Active substance use or alcohol use disorder  [ ] Psychiatric co-morbidity  [X] Sleep apnea  [ ] COPD  [X ] BMI> 35  [ ] Liver dysfunction  [ ] Renal dysfunction  [ ] CHF  [ ] Smoker  [X ]  Age > 60 years    [X ]  [place]  Reviewed and Copied to Chart. See below.    Plan of care and goal oriented pain management treatment options were discussed with patient and /or primary care giver; all questions and concerns were addressed and care was aligned with patient's wishes.    Educated patient on goal oriented pain management treatment options     04-14-23 @ 13:51

## 2023-04-14 NOTE — DISCHARGE NOTE PROVIDER - NSDCFUSCHEDAPPT_GEN_ALL_CORE_FT
Randell Rivas  [facility] Physician [facility]  NEUROSURG [address] [facility]  Scheduled Appointment: 04/27/2023    James Torres  [facility] Physician [facility]  OTOLARYNG [address] [facility]  Scheduled Appointment: 05/11/2023

## 2023-04-14 NOTE — DISCHARGE NOTE PROVIDER - NSDCCPTREATMENT_GEN_ALL_CORE_FT
PRINCIPAL PROCEDURE  Procedure: Posterior fusion of lumbar spine with laminectomy  Findings and Treatment: L4-L5 LAMINECTOMY AND FUSION WITH POSTERIOR INSTRUMENTATION  Pain Management- See Attached Medication Reconciliation  Weight Bearing Status: Weight bearing as tolerated  > No bending, twisting, heavy lifting   Equipment needs: Commode, Walker  Dressing: Please keep bandage/dressing Clean, Dry, and Intact.  Incision Site:   Absorbable sutures were placed under the skin.  No suture removal required   PT/Occupational Therapy are Activities of Daily Living as appropriate  Follow up with Neurosurgeon, Dr. Rivas in office in two weeks

## 2023-04-14 NOTE — DISCHARGE NOTE PROVIDER - NSDCMRMEDTOKEN_GEN_ALL_CORE_FT
amlodipine-atorvastatin 10 mg-80 mg oral tablet: 1 tab(s) orally once a day  atorvastatin 20 mg oral tablet: 1 orally once a day  gabapentin 300 mg oral capsule: 1 cap(s) orally every 8 hours MDD: 3  methocarbamol 750 mg oral tablet: 1 tab(s) orally every 8 hours as needed for  muscle spasm MDD: 3  senna leaf extract oral tablet: 2 tab(s) orally once a day (at bedtime) as needed for  constipation MDD: 2  silodosin 8 mg oral capsule: 1 orally once a day  traMADol 50 mg oral tablet: 1 tab(s) orally every 6 hours as needed for  severe pain MDD: 4  zolpidem 10 mg oral tablet: 1 orally once a day   amlodipine-atorvastatin 10 mg-80 mg oral tablet: 1 tab(s) orally once a day  atorvastatin 20 mg oral tablet: 1 orally once a day  Eliquis 2.5 mg oral tablet: 1 tab(s) orally 2 times a day Please take until 5/9/2023  Eliquis 5 mg oral tablet: 1 tab(s) orally 2 times a day Please start taking on 5/10/2023  gabapentin 300 mg oral capsule: 1 cap(s) orally every 8 hours MDD: 3  methocarbamol 750 mg oral tablet: 1 tab(s) orally every 8 hours as needed for  muscle spasm MDD: 3  senna leaf extract oral tablet: 2 tab(s) orally once a day (at bedtime) as needed for  constipation MDD: 2  silodosin 8 mg oral capsule: 1 orally once a day  traMADol 50 mg oral tablet: 1 tab(s) orally every 6 hours as needed for  severe pain MDD: 4  zolpidem 10 mg oral tablet: 1 orally once a day

## 2023-04-14 NOTE — DISCHARGE NOTE NURSING/CASE MANAGEMENT/SOCIAL WORK - NSDCPEFALRISK_GEN_ALL_CORE
For information on Fall & Injury Prevention, visit: [email or web address] OR  [email or web address] OR  https://www.cdc.gov/steadi/patient.html

## 2023-04-14 NOTE — PROGRESS NOTE ADULT - SUBJECTIVE AND OBJECTIVE BOX
Pt Name: ABEBA SCHULTZ  MRN: [id number]        Orthopedic diagnosis:  - S/p L4-5 lami and fusion POD#4    24 hr interval:    Patient seen and evaluated at bedside.   Patient with complaints of pain localized to lower back, rated a 2/10 in severity non radiating, denies paresthesias. Notes that the pain has improved since yesterday.   Patient reports ambulating around the unit yesterday with PT.   Admits to voiding independently, denies any BM.    [  DENIES   ] bladder/bowel incontinence or changes  [  DENIES   ] saddle-like paresthesias  denies cp/sob/palpitations      PHYSICAL EXAM:  General; Awake and alert, Oriented x 3  Hips/Pelvis:   Able to SLR bilaterally. Negative log roll, heel strike. No pain on passive Int/Ext hip rotation.  Spine exam:  Back:   Extremities:          -Left Upper Extremity: Atraumatic with normal alignment NROM. No crepitus. No bony tenderness.      -Right Upper Extremity: Atraumatic with normal alignment NROM. No crepitus. No bony tenderness.      -Left Lower Extremity: Atraumatic with normal alignment NROM. No crepitus. No bony tenderness. No calf tenderness, Calf soft.     -Right Lower Extremity: Atraumatic with normal alignment NROM. No crepitus. No bony tenderness.  No calf tenderness, Calf soft.         >Sensation:  intact to light touch           >Motor exam:          >Bilateral Upper extremities    Delt      Bicep      Tri      Wrist ext  Wrst Flex       Digit Ext Digit Flex                                                   R         5/5        5/5        5/5       5/5            5/5            5/5       5/5          5/5                                                   L          5/5        5/5        5/5       5/5            5/5            5/5       5/5          5/5         >Bilateral Lower ext.     Hip Flx  Hip Ext    Quad   Hamstrg   TA       EHL      GS                                          R        5/5        5/5        5/5        5/5          5/5     5/5      5/5                                          L         5/5        5/5        4/5        5/5          5/5     5/5      5/5      A: Pt is a  67y Male s/p L4-5 Laminectomy and fusion pod#4    Plan:  - Recommendation    > Pain control   -  DVT prophylaxis  -  Daily PT- WBAT of the lower ext, proper body mechanics    > Spine precautions - no bending, twisting, lifting   -  For Home discharge pending   -  Case d/w  Pt Name: ABEBA SCHULTZ  MRN: [id number]        Orthopedic diagnosis:  - S/p L4-5 lami and fusion POD#4    24 hr interval:    Patient seen and evaluated at bedside.   Patient with complaints of pain localized to lower back, rated a 2/10 in severity non radiating, denies paresthesias. Notes that the pain has improved since yesterday.   Patient reports ambulating around the unit yesterday with PT.   Admits to voiding independently, denies any BM, +Flatus.    [  DENIES   ] bladder/bowel incontinence or changes  [  DENIES   ] saddle-like paresthesias  denies cp/sob/palpitations      PHYSICAL EXAM:  General; Awake and alert, Oriented x 3  Hips/Pelvis:   Able to SLR bilaterally. Negative log roll, heel strike. No pain on passive Int/Ext hip rotation.  Spine exam:  Back:   Extremities:          -Left Upper Extremity: Atraumatic with normal alignment NROM. No crepitus. No bony tenderness.      -Right Upper Extremity: Atraumatic with normal alignment NROM. No crepitus. No bony tenderness.      -Left Lower Extremity: Atraumatic with normal alignment NROM. No crepitus. No bony tenderness. No calf tenderness, Calf soft.     -Right Lower Extremity: Atraumatic with normal alignment NROM. No crepitus. No bony tenderness.  No calf tenderness, Calf soft.         >Sensation:  intact to light touch           >Motor exam:          >Bilateral Upper extremities    Delt      Bicep      Tri      Wrist ext  Wrst Flex       Digit Ext Digit Flex                                                   R         5/5        5/5        5/5       5/5            5/5            5/5       5/5          5/5                                                   L          5/5        5/5        5/5       5/5            5/5            5/5       5/5          5/5         >Bilateral Lower ext.     Hip Flx  Hip Ext    Quad   Hamstrg   TA       EHL      GS                                          R        5/5        5/5        5/5        5/5          5/5     5/5      5/5                                          L         5/5        5/5        4/5        5/5          5/5     5/5      5/5      A: Pt is a  67y Male s/p L4-5 Laminectomy and fusion pod#4    Plan:  - Lactulose, dulcolax given to induce bowel movement.   - Recommendation    > Pain control   -  DVT prophylaxis  -  Daily PT- WBAT of the lower ext, proper body mechanics    > Spine precautions - no bending, twisting, lifting   -  For Home discharge pending   -  Case d/w  Pt Name: ABEBA SCHULTZ  MRN: [id number]        Orthopedic diagnosis:  - S/p L4-5 lami and fusion POD#4    24 hr interval:    Patient seen and evaluated at bedside.   Patient with complaints of pain localized to lower back, rated a 2/10 in severity non radiating, denies paresthesias. Notes that the pain has improved since yesterday.   Patient reports ambulating around the unit yesterday with PT.   Admits to voiding independently, Patient admitted to  today, +Flatus.    [  DENIES   ] bladder/bowel incontinence or changes  [  DENIES   ] saddle-like paresthesias  denies cp/sob/palpitations      PHYSICAL EXAM:  General; Awake and alert, Oriented x 3  Hips/Pelvis:   Able to SLR bilaterally. Negative log roll, heel strike. No pain on passive Int/Ext hip rotation.  Spine exam:  Back:   Extremities:          -Left Upper Extremity: Atraumatic with normal alignment NROM. No crepitus. No bony tenderness.      -Right Upper Extremity: Atraumatic with normal alignment NROM. No crepitus. No bony tenderness.      -Left Lower Extremity: Atraumatic with normal alignment NROM. No crepitus. No bony tenderness. No calf tenderness, Calf soft.     -Right Lower Extremity: Atraumatic with normal alignment NROM. No crepitus. No bony tenderness.  No calf tenderness, Calf soft.         >Sensation:  intact to light touch           >Motor exam:          >Bilateral Upper extremities    Delt      Bicep      Tri      Wrist ext  Wrst Flex       Digit Ext Digit Flex                                                   R         5/5        5/5        5/5       5/5            5/5            5/5       5/5          5/5                                                   L          5/5        5/5        5/5       5/5            5/5            5/5       5/5          5/5         >Bilateral Lower ext.     Hip Flx  Hip Ext    Quad   Hamstrg   TA       EHL      GS                                          R        5/5        5/5        5/5        5/5          5/5     5/5      5/5                                          L         5/5        5/5        4/5        5/5          5/5     5/5      5/5      A: Pt is a  67y Male s/p L4-5 Laminectomy and fusion pod#4    Plan:  - Recommendation    > Pain control   -  DVT prophylaxis  -  Daily PT- WBAT of the lower ext, proper body mechanics    > Spine precautions - no bending, twisting, lifting   -  For Home discharge pending   -  Case d/w

## 2023-04-15 DIAGNOSIS — R00.1 BRADYCARDIA, UNSPECIFIED: ICD-10-CM

## 2023-04-15 DIAGNOSIS — I82.409 ACUTE EMBOLISM AND THROMBOSIS OF UNSPECIFIED DEEP VEINS OF UNSPECIFIED LOWER EXTREMITY: ICD-10-CM

## 2023-04-15 PROCEDURE — 99232 SBSQ HOSP IP/OBS MODERATE 35: CPT

## 2023-04-15 RX ADMIN — Medication 1000 MILLIGRAM(S): at 15:15

## 2023-04-15 RX ADMIN — GABAPENTIN 300 MILLIGRAM(S): 400 CAPSULE ORAL at 14:08

## 2023-04-15 RX ADMIN — PANTOPRAZOLE SODIUM 40 MILLIGRAM(S): 20 TABLET, DELAYED RELEASE ORAL at 12:20

## 2023-04-15 RX ADMIN — Medication 1000 MILLIGRAM(S): at 14:08

## 2023-04-15 RX ADMIN — APIXABAN 2.5 MILLIGRAM(S): 2.5 TABLET, FILM COATED ORAL at 17:19

## 2023-04-15 RX ADMIN — METHOCARBAMOL 750 MILLIGRAM(S): 500 TABLET, FILM COATED ORAL at 14:09

## 2023-04-15 RX ADMIN — MORPHINE SULFATE 15 MILLIGRAM(S): 50 CAPSULE, EXTENDED RELEASE ORAL at 05:57

## 2023-04-15 RX ADMIN — GABAPENTIN 300 MILLIGRAM(S): 400 CAPSULE ORAL at 21:09

## 2023-04-15 RX ADMIN — MORPHINE SULFATE 15 MILLIGRAM(S): 50 CAPSULE, EXTENDED RELEASE ORAL at 04:48

## 2023-04-15 RX ADMIN — Medication 1000 MILLIGRAM(S): at 21:09

## 2023-04-15 RX ADMIN — APIXABAN 2.5 MILLIGRAM(S): 2.5 TABLET, FILM COATED ORAL at 05:45

## 2023-04-15 RX ADMIN — POLYETHYLENE GLYCOL 3350 17 GRAM(S): 17 POWDER, FOR SOLUTION ORAL at 12:20

## 2023-04-15 RX ADMIN — MAGNESIUM HYDROXIDE 30 MILLILITER(S): 400 TABLET, CHEWABLE ORAL at 12:20

## 2023-04-15 RX ADMIN — Medication 1000 MILLIGRAM(S): at 22:00

## 2023-04-15 RX ADMIN — MORPHINE SULFATE 15 MILLIGRAM(S): 50 CAPSULE, EXTENDED RELEASE ORAL at 00:00

## 2023-04-15 NOTE — PROGRESS NOTE ADULT - PROBLEM SELECTOR PLAN 1
Pt with acute postop lumbar back pain greatest over left lumbar back which is somatic and neuropathic in nature due to s/p L4-L5 laminectomy and fusion with posterior instrumentation on 4/10, POD #4.   Opioid pain recommendations   - Discontinued oxycodone 4/11.  - Discontinued dilaudid 4/12.   - Discontinued Tramadol 4/13.  - Continue morphine 15mg PO q4h PRN severe pain.  Non-opioid pain recommendations   - NSIADs per surgical team   - Continue Acetaminophen 1 gram PO q 8 hours x 3 days, then PRN moderate pain. Monitor LFTs  - Continue Gabapentin 300mg po q 8 hours. Monitor renal function.   - Continue methocarbamol 750mg PO TID per neurosurgery.   Bowel Regimen  - Continue Miralax 17G PO daily  - Continue Senna 2 tablets at bedtime for constipation  Mild pain   - Non-pharmacological pain treatment recommendations  - Warm/ Cool packs PRN   - Repositioning, imagery, relaxation, distraction.  - Physical therapy OOB if no contraindications   Recommendations discussed with primary team and RN. Pt with acute postop lumbar back pain greatest over left lumbar back which is somatic and neuropathic in nature due to s/p L4-L5 laminectomy and fusion with posterior instrumentation on 4/10, POD #5.   Opioid pain recommendations   - Discontinued oxycodone 4/11.  - Discontinued Dilaudid 4/12.   - Discontinued Tramadol 4/13.  - Discontinue Morphine 15mg PO q4h PRN severe pain on 4/15 due to bradycardia 38 bpm.  Non-opioid pain recommendations   - NSIADs per surgical team   - Continue Acetaminophen 1 gram PO q 8 hours x 3 days, then PRN moderate pain. Monitor LFTs  - Continue Gabapentin 300mg po q 8 hours. Monitor renal function.   - Continue methocarbamol 750mg PO TID per neurosurgery.   Bowel Regimen  - Continue Miralax 17G PO daily  - Continue Senna 2 tablets at bedtime for constipation  Mild pain   - Non-pharmacological pain treatment recommendations  - Warm/ Cool packs PRN   - Repositioning, imagery, relaxation, distraction.  - Physical therapy OOB if no contraindications   Recommendations discussed with primary team and RN.

## 2023-04-15 NOTE — PROGRESS NOTE ADULT - SUBJECTIVE AND OBJECTIVE BOX
Source of information: ABEBA SCHULTZ, Chart review  Patient language: English  : n/a    HPI:      This is a Patient is a 67y old  Male who presents with a chief complaint of L4-L5 fusion (15 Apr 2023 08:25)  . Pt diagnosed with ... Pain consulted for ...Pt seen and examined at bedside. Pt reports PAIN SCORE:  *** SCALE USED: (1-10 VNRS). Pain adequately managed on current pain regimen. Pt describes pain as .... radiating to... alleviated by pain medications... exacerbated by movement.... Pt tolerating PO diet. Pt denies lethargy, chest pain, SOB, nausea, vomiting and constipation. Reports last BM ***. Patient stated goal for pain control: to be able to take deep breaths, get out of bed to chair and ambulate with tolerable pain control.     PAST MEDICAL & SURGICAL HISTORY:  HLD (hyperlipidemia)      Obstructive sleep apnea on C PAP  no longer using CPAP machine      Migraine headache      Bilateral otitis media      HTN (hypertension)      DVT (deep venous thrombosis)  LLE in 2019 treated with eliquis for 6 months      HLD (hyperlipidemia)      Vertigo      Chronic mastoiditis  right ear      Unspecified cholesteatoma, right ear      Otalgia, right ear      HTN (hypertension)      Poor historian      Renal cyst, left      Prediabetes  pt states A1c is 6. denies current use of meds      Lumbar herniated disc      History of umbilical hernia repair  x2      History of weight loss surgery  5 years ago in [place]      S/P shoulder surgery  left shoulder      S/P UPPP (uvulopalatopharyngoplasty)  >5 years ago      H/O prostate biopsy          FAMILY HISTORY:  FH: asthma  daughter, brother        Social History:   [ ]Denies ETOH use, illicit drug use, and smoking     Allergies    No Known Allergies    Intolerances        MEDICATIONS  (STANDING):  acetaminophen     Tablet .. 1000 milliGRAM(s) Oral every 8 hours  apixaban 2.5 milliGRAM(s) Oral two times a day  gabapentin 300 milliGRAM(s) Oral every 8 hours  magnesium hydroxide Suspension 30 milliLiter(s) Oral daily  methocarbamol 750 milliGRAM(s) Oral every 8 hours  pantoprazole  Injectable 40 milliGRAM(s) IV Push daily  polyethylene glycol 3350 17 Gram(s) Oral daily  senna 2 Tablet(s) Oral at bedtime    MEDICATIONS  (PRN):  ondansetron Injectable 4 milliGRAM(s) IV Push every 6 hours PRN Nausea and/or Vomiting      Vital Signs Last 24 Hrs  T(C): 36.4 (15 Apr 2023 06:00), Max: 36.8 (14 Apr 2023 21:09)  T(F): 97.6 (15 Apr 2023 06:00), Max: 98.2 (14 Apr 2023 21:09)  HR: 58 (15 Apr 2023 06:31) (38 - 79)  BP: 117/68 (15 Apr 2023 06:00) (115/69 - 122/68)  BP(mean): 80 (15 Apr 2023 06:00) (80 - 80)  RR: 18 (15 Apr 2023 06:00) (16 - 18)  SpO2: 95% (15 Apr 2023 06:00) (92% - 95%)    Parameters below as of 15 Apr 2023 06:00  Patient On (Oxygen Delivery Method): room air      COVID-19 PCR: NotDetec (10 Apr 2023 10:10)    LABS: Reviewed                          15.7   6.22  )-----------( 152      ( 14 Apr 2023 18:15 )             48.0           PT/INR - ( 14 Apr 2023 18:15 )   PT: 12.5 sec;   INR: 1.05 ratio         PTT - ( 14 Apr 2023 18:15 )  PTT:30.0 sec      CAPILLARY BLOOD GLUCOSE        COVID-19 PCR: NotDetec (10 Apr 2023 10:10)      Radiology: Reviewed    ORT Score -   Family Hx of substance abuse	Female	      Male  Alcohol 	                                           1                     3  Illegal drugs	                                   2                     3  Rx drugs                                           4 	                  4  Personal Hx of substance abuse		  Alcohol 	                                          3	                  3  Illegal drugs                                     4	                  4  Rx drugs                                            5 	                  5  Age between 16- 45 years	           1                     1  hx preadolescent sexual abuse	   3 	                  0  Psychological disease		  ADD, OCD, bipolar, schizophrenia   2	          2  Depression                                           1 	          1  Total: 0    a score of 3 or lower indicates low risk for opioid abuse		  a score of 4-7 indicates moderate risk for opioid abuse		  a score of 8 or higher indicates high risk for opioid abuse    REVIEW OF SYSTEMS:  CONSTITUTIONAL: No fever or fatigue  HEENT:  No difficulty hearing, no change in vision  NECK: No pain or stiffness  RESPIRATORY: No cough, wheezing, chills or hemoptysis; No shortness of breath  CARDIOVASCULAR: No chest pain, palpitations, dizziness, or leg swelling  GASTROINTESTINAL: No loss of appetite, decreased PO intake. No abdominal or epigastric pain. No nausea, vomiting; No diarrhea or constipation.   GENITOURINARY: No dysuria, frequency, hematuria, retention or incontinence  MUSCULOSKELETAL: No joint pain or swelling; No muscle, back, or extremity pain, no upper or lower motor strength weakness, no saddle anesthesia, bowel/bladder incontinence, no falls   NEURO: No headaches, No numbness/tingling b/l LE, No weakness  ENDOCRINE: No polyuria, polydipsia, heat or cold intolerance; No hair loss  PSYCHIATRIC: No depression, anxiety or difficulty sleeping    PHYSICAL EXAM:  GENERAL:  Alert & Oriented X4, cooperative, NAD, Good concentration. Speech is clear.   RESPIRATORY: Respirations even and unlabored. Clear to auscultation bilaterally; No rales, rhonchi, wheezing, or rubs  CARDIOVASCULAR: Normal S1/S2, regular rate and rhythm; No murmurs, rubs, or gallops. No JVD.   GASTROINTESTINAL:  Soft, Nontender, Nondistended; Bowel sounds present  PERIPHERAL VASCULAR:  Extremities warm without edema. 2+ Peripheral Pulses, No cyanosis, No calf tenderness  MUSCULOSKELETAL: Motor Strength 5/5 B/L upper and lower extremities; moves all extremities equally against gravity; ROM intact; negative SLR; No tenderness on palpation of all joints.   SKIN: Warm, dry, intact. No rashes, lesions, scars or wounds.     Risk factors associated with adverse outcomes related to opioid treatment  [ ]  Concurrent benzodiazepine use  [ ]  History/ Active substance use or alcohol use disorder  [ ] Psychiatric co-morbidity  [ ] Sleep apnea  [ ] COPD  [ ] BMI> 35  [ ] Liver dysfunction  [ ] Renal dysfunction  [ ] CHF  [ ] Smoker  [ ]  Age > 60 years    [ ]  [facility] Reviewed and Copied to Chart. See below.    Plan of care and goal oriented pain management treatment options were discussed with patient and /or primary care giver; all questions and concerns were addressed and care was aligned with patient's wishes.    Educated patient on goal oriented pain management treatment options     04-15-23 @ 11:59     Source of information: ABEBA SCHULTZ, Chart review  Patient language: Mauritian  : Judy # [id number]    HPI:    Patient is a 67y old  Male with PMH HTN HLD obesity, HAMIDA, HTN lumbar disc herniation, lumbar stenosis, lumbar radiculopathy who presents for a scheduled lumbar laminectomy. Pt s/p L4-L5 laminectomy and fusion with posterior instrumentation on 4/10, POD #5. Pain consulted for postop back pain. Pt seen and examined sitting in bedside chair, reports lumbar back pain greatest over left lumbar back, reports pain score 6-7/10 and tolerable with current pain regimen SCALE USED: (1-10 VNRS). Pt describes pain as constant, throbbing, sharp, radiating to right hip, alleviated by current PO pain medication, exacerbated by movement. Trialed oxycodone 10mg PO, Dilaudid 4mg PO and Tramadol 75mg PO PRN without effective pain relief. Pt was started on Morphine however due to an episode of asymptomatic bradycardia 38 bpm morphine was discontinued today. Pt tolerating PO diet. Denies lethargy, chest pain, SOB, nausea, vomiting, constipation. Reports last BM 4/15. Patient stated goal for pain control: to be able to take deep breaths, get out of bed to chair and ambulate with tolerable pain control. Reports ambulating in room with walker.  Pt denies taking medications for pain at home.     PAST MEDICAL & SURGICAL HISTORY:  HLD (hyperlipidemia)    Obstructive sleep apnea on C PAP  no longer using CPAP machine    Migraine headache    Bilateral otitis media    HTN (hypertension)    DVT (deep venous thrombosis)  LLE in 2019 treated with eliquis for 6 months    HLD (hyperlipidemia)    Vertigo    Chronic mastoiditis  right ear    Unspecified cholesteatoma, right ear    Otalgia, right ear    HTN (hypertension)    Poor historian    Renal cyst, left    Prediabetes  pt states A1c is 6. denies current use of meds    Lumbar herniated disc    History of umbilical hernia repair  x2    History of weight loss surgery  5 years ago in [place]      S/P shoulder surgery  left shoulder    S/P UPPP (uvulopalatopharyngoplasty)  >5 years ago    H/O prostate biopsy    FAMILY HISTORY:  FH: asthma  daughter, brother      Social History:   [X]Denies ETOH use, illicit drug use, and smoking     Allergies    No Known Allergies    MEDICATIONS  (STANDING):  acetaminophen     Tablet .. 1000 milliGRAM(s) Oral every 8 hours  apixaban 2.5 milliGRAM(s) Oral two times a day  gabapentin 300 milliGRAM(s) Oral every 8 hours  magnesium hydroxide Suspension 30 milliLiter(s) Oral daily  methocarbamol 750 milliGRAM(s) Oral every 8 hours  pantoprazole  Injectable 40 milliGRAM(s) IV Push daily  polyethylene glycol 3350 17 Gram(s) Oral daily  senna 2 Tablet(s) Oral at bedtime    MEDICATIONS  (PRN):  ondansetron Injectable 4 milliGRAM(s) IV Push every 6 hours PRN Nausea and/or Vomiting      Vital Signs Last 24 Hrs  T(C): 36.4 (15 Apr 2023 06:00), Max: 36.8 (14 Apr 2023 21:09)  T(F): 97.6 (15 Apr 2023 06:00), Max: 98.2 (14 Apr 2023 21:09)  HR: 58 (15 Apr 2023 06:31) (38 - 79)  BP: 117/68 (15 Apr 2023 06:00) (115/69 - 122/68)  BP(mean): 80 (15 Apr 2023 06:00) (80 - 80)  RR: 18 (15 Apr 2023 06:00) (16 - 18)  SpO2: 95% (15 Apr 2023 06:00) (92% - 95%)    Parameters below as of 15 Apr 2023 06:00  Patient On (Oxygen Delivery Method): room air      COVID-19 PCR: NotDetec (10 Apr 2023 10:10)    LABS: Reviewed                          15.7   6.22  )-----------( 152      ( 14 Apr 2023 18:15 )             48.0       PT/INR - ( 14 Apr 2023 18:15 )   PT: 12.5 sec;   INR: 1.05 ratio         PTT - ( 14 Apr 2023 18:15 )  PTT:30.0 sec      CAPILLARY BLOOD GLUCOSE        COVID-19 PCR: NotDetec (10 Apr 2023 10:10)      Radiology: Reviewed    PROCEDURE DATE:  04/14/2023      INTERPRETATION:  Lumbar spine:    HISTORY: Postop    COMPARISON: 4/10/2023    Three views of the lumbar spine reveal normal alignment with   straightening. The vertebral bodies are satisfactory in height with no   evidence of recent or old compression fracture. Posterior fusion hardware   from L4 to L5 is again noted as is intervertebral disc implant at L4-5.   The remaining disc spaces are maintained.    IMPRESSION:  Postoperative changes.    Thank you for this referral.    --- End of Report ---    JENNIFER ENRIQUE MD; Attending Interventional Radiologist  This document has been electronically signed. Apr 14 2023  3:13PM    ORT Score -   Family Hx of substance abuse	Female	      Male  Alcohol 	                                           1                     3  Illegal drugs	                                   2                     3  Rx drugs                                           4 	                  4  Personal Hx of substance abuse		  Alcohol 	                                          3	                  3  Illegal drugs                                     4	                  4  Rx drugs                                            5 	                  5  Age between 16- 45 years	           1                     1  hx preadolescent sexual abuse	   3 	                  0  Psychological disease		  ADD, OCD, bipolar, schizophrenia   2	          2  Depression                                           1 	          1  Total: 0    a score of 3 or lower indicates low risk for opioid abuse		  a score of 4-7 indicates moderate risk for opioid abuse		  a score of 8 or higher indicates high risk for opioid abuse    REVIEW OF SYSTEMS:  CONSTITUTIONAL: No fever or fatigue  HEENT:  No difficulty hearing, no change in vision  NECK: No pain or stiffness  RESPIRATORY: No cough, wheezing, chills or hemoptysis; No shortness of breath  CARDIOVASCULAR: No chest pain, palpitations, dizziness, or leg swelling, episode of asymptomatic bradycardia pending cardiology consult  GASTROINTESTINAL: No loss of appetite, decreased PO intake. No abdominal or epigastric pain. No nausea no vomiting; No diarrhea or constipation. +hx GERD,   GENITOURINARY: No dysuria, frequency, hematuria, retention or incontinence  MUSCULOSKELETAL: + left lumbar back pain; No joint swelling; no upper or lower motor strength weakness, no saddle anesthesia, no bowel/bladder incontinence, no falls   NEURO: No headaches, No numbness/tingling b/l LE, No weakness    PHYSICAL EXAM:  GENERAL:  Alert & Oriented X4, cooperative, NAD, Good concentration. Speech is clear.   RESPIRATORY: Respirations even and unlabored. Clear to auscultation bilaterally; No rales, rhonchi, wheezing, or rubs   CARDIOVASCULAR: Normal S1/S2, regular rate and rhythm; No murmurs, rubs, or gallops. No JVD.   GASTROINTESTINAL: + obese per BMI, Soft, Nontender, Nondistended; Bowel sounds present  PERIPHERAL VASCULAR:  Extremities warm without edema. 2+ Peripheral Pulses, No cyanosis, No calf tenderness  MUSCULOSKELETAL: Motor Strength 5/5 B/L upper and 4/5 lower extremities, ambulates with walker; ROM intact. + left lumbar back tenderness on palpation.   SKIN: Warm, dry, intact. No rashes, lesions, scars. +lumbar back surgical gauze dressing x 2 c/d/i        Risk factors associated with adverse outcomes related to opioid treatment  [ ]  Concurrent benzodiazepine use  [ ]  History/ Active substance use or alcohol use disorder  [ ] Psychiatric co-morbidity  [X] Sleep apnea  [ ] COPD  [X] BMI> 35  [ ] Liver dysfunction  [ ] Renal dysfunction  [ ] CHF  [ ] Smoker  [X]  Age > 60 years    [X]  [place]  Reviewed and Copied to Chart. See below.    Plan of care and goal oriented pain management treatment options were discussed with patient and /or primary care giver; all questions and concerns were addressed and care was aligned with patient's wishes.    Educated patient on goal oriented pain management treatment options     04-15-23 @ 11:59

## 2023-04-15 NOTE — CONSULT NOTE ADULT - ASSESSMENT
67 yr old male with PMHX of HLD (hyperlipidemia),Obstructive sleep apnea on C PAP-no longer using CPAP machine,Migraine headache,Bilateral otitis media,  HTN (hypertension),DVT (deep venous thrombosis)LLE in 2019 treated with eliquis for 6 months,Prediabetes-pt states A1c is 6. denies current use of meds,Lumbar herniated disc  who is s/p l4-5 fusion with DVT found postop. Overnight pt with bradycardia in 30's.  1.Bradycardia due to  HAMIDA-CPAP.  2.DVT-tx as per neurosurgery.  3.Prediabetes-diet.  4.Pain management.  5.No av blocking agents.  6.PPI.  7.Tele monitoring  8.Check TFT's. negative...

## 2023-04-15 NOTE — CONSULT NOTE ADULT - CONSULT REQUESTED BY NAME
Complex Repair And A-T Advancement Flap Text: The defect edges were debeveled with a #15 scalpel blade.  The primary defect was closed partially with a complex linear closure.  Given the location of the remaining defect, shape of the defect and the proximity to free margins an A-T advancement flap was deemed most appropriate for complete closure of the defect.  Using a sterile surgical marker, an appropriate advancement flap was drawn incorporating the defect and placing the expected incisions within the relaxed skin tension lines where possible.    The area thus outlined was incised deep to adipose tissue with a #15 scalpel blade.  The skin margins were undermined to an appropriate distance in all directions utilizing iris scissors.
Dr. Randell Rivas
Dr. Rivas
Dr Rivas

## 2023-04-15 NOTE — PROGRESS NOTE ADULT - ASSESSMENT
Data Detail Level: Printer-Friendly View Extended View  Confidential Drug Utilization Report  Search Terms: Andrez Thomas, 1955Search Date: 04/11/2023 08:38:54 AM  The Drug Utilization Report below displays all of the controlled substance prescriptions, if any, that your patient has filled in the last twelve months. The information displayed on this report is compiled from pharmacy submissions to the Department, and accurately reflects the information as submitted by the pharmacies.    This report was requested by: Lisa Connelly | Reference #: [id number]    There are no results for the search terms that you entered.

## 2023-04-15 NOTE — PROGRESS NOTE ADULT - SUBJECTIVE AND OBJECTIVE BOX
pt seen in icu [  ], reg med floor [x   ], bed [  ], chair at bedside [  x ]  Awake, alert, comfortable out of bed to chair. Having bradycardia but no chest pain  REVIEW OF SYSTEMS:    CONSTITUTIONAL: No weakness, fevers or chills  EYES/ENT: No visual changes;  No vertigo or throat pain   NECK: No pain or stiffness  RESPIRATORY: No cough, wheezing, hemoptysis; No shortness of breath  CARDIOVASCULAR: No chest pain or palpitations  GASTROINTESTINAL: No abdominal or epigastric pain. No nausea, vomiting, or hematemesis; No diarrhea or constipation. No melena or hematochezia.  GENITOURINARY: No dysuria, frequency or hematuria  NEUROLOGICAL: No numbness or weakness  SKIN: No itching, burning, rashes, or lesions   All other review of systems is negative unless indicated above.    Physical Exam    General: WN/WD NAD  Neurology: A&Ox3, nonfocal, KONG x 4  Respiratory: CTA B/L  CV: RRR, S1S2, no murmurs, rubs or gallops  Abdominal: Soft, NT, ND +BS, Last BM  Extremities: + edema, + peripheral pulses      Allergies  No Known Allergies      Health Issues  Dorsalgia    Borderline hypertension    HLD (hyperlipidemia)    Obstructive sleep apnea on C PAP    Migraine headache    Bilateral otitis media    HTN (hypertension)    DVT (deep venous thrombosis)    HLD (hyperlipidemia)    Vertigo    Chronic mastoiditis    Unspecified cholesteatoma, right ear    Otalgia, right ear    HTN (hypertension)    Poor historian    Renal cyst, left    Prediabetes    Lumbar herniated disc    History of umbilical hernia repair    Hx of umbilical hernia repair 2010    History of weight loss surgery    S/P shoulder surgery    S/P UPPP (uvulopalatopharyngoplasty)    H/O prostate biopsy        Vitals  T(F): 97.6 (04-15-23 @ 06:00), Max: 98.2 (04-14-23 @ 21:09)  HR: 58 (04-15-23 @ 06:31) (38 - 79)  BP: 117/68 (04-15-23 @ 06:00) (115/69 - 117/68)  RR: 18 (04-15-23 @ 06:00) (16 - 18)  SpO2: 95% (04-15-23 @ 06:00) (92% - 95%)  Wt(kg): --  CAPILLARY BLOOD GLUCOSE          Labs                          15.7   6.22  )-----------( 152      ( 14 Apr 2023 18:15 )             48.0                     Radiology Results  < from: US Duplex Venous Lower Ext Ltd, Right (04.14.23 @ 15:58) >  IMPRESSION:  Acute DVT right gastrocnemius vein.      Results were discussed with KIKE Ellison 4/14/2023 4:12 PM by the   sonographer upon completion with read back confirmation.      < end of copied text >      Meds    MEDICATIONS  (STANDING):  acetaminophen     Tablet .. 1000 milliGRAM(s) Oral every 8 hours  apixaban 2.5 milliGRAM(s) Oral two times a day  gabapentin 300 milliGRAM(s) Oral every 8 hours  magnesium hydroxide Suspension 30 milliLiter(s) Oral daily  methocarbamol 750 milliGRAM(s) Oral every 8 hours  pantoprazole  Injectable 40 milliGRAM(s) IV Push daily  polyethylene glycol 3350 17 Gram(s) Oral daily  senna 2 Tablet(s) Oral at bedtime      MEDICATIONS  (PRN):  ondansetron Injectable 4 milliGRAM(s) IV Push every 6 hours PRN Nausea and/or Vomiting

## 2023-04-15 NOTE — PROGRESS NOTE ADULT - SUBJECTIVE AND OBJECTIVE BOX
Pt Name: ABEBA SCHULTZ  MRN: [id number]        Orthopedic diagnosis:  - S/p L4-5 lami and fusion POD#5    24 hr interval:    Patient seen and evaluated at bedside.   Patient with complaints of pain localized to lower back, rated a 2/10 in severity non radiating, denies paresthesias. Notes that the pain has improved since yesterday.   Patient reports ambulating around the unit   Admits to voiding independently, + BM  Patient with complaints of right calf pain, venous doppler performed yesterday which was positive for a right gastrocnemius DVT.   Nurse reported that overnight morphine was discontinued due to patient becoming bradycardic.     [  DENIES   ] bladder/bowel incontinence or changes  [  DENIES   ] saddle-like paresthesias  denies cp/sob/palpitations      PHYSICAL EXAM:  General; Awake and alert, Oriented x 3  Hips/Pelvis:   Able to SLR bilaterally. Negative log roll, heel strike. No pain on passive Int/Ext hip rotation.  Spine exam:  Back:   - Skin warm and pink. Dressing C/D/I. No surrounding erythema.   Extremities:          -Left Upper Extremity: Atraumatic with normal alignment NROM. No crepitus. No bony tenderness.      -Right Upper Extremity: Atraumatic with normal alignment NROM. No crepitus. No bony tenderness.      -Left Lower Extremity: Atraumatic with normal alignment NROM. No crepitus. No bony tenderness. No calf tenderness, Calf soft.     -Right Lower Extremity: Atraumatic with normal alignment NROM. No crepitus. No bony tenderness.  No calf tenderness, Calf soft.         >Sensation:  intact to light touch           >Motor exam:          >Bilateral Upper extremities    Delt      Bicep      Tri      Wrist ext  Wrst Flex       Digit Ext Digit Flex                                                   R         5/5        5/5        5/5       5/5            5/5            5/5       5/5          5/5                                                   L          5/5        5/5        5/5       5/5            5/5            5/5       5/5          5/5         >Bilateral Lower ext.     Hip Flx  Hip Ext    Quad   Hamstrg   TA       EHL      GS                                          R        5/5        5/5        5/5        5/5          5/5     5/5      5/5                                          L         5/5        5/5        4/5        5/5          5/5     5/5      5/5      A: Pt is a  67y Male s/p L4-5 Laminectomy and fusion pod#5    Plan:  - Recommendation  - Pain control      > Morphine was discontinued overnight due to bradycardia  > Patient was started on eliquis 2.5mg BID for RLE DVT   -  Daily PT- WBAT of the lower ext, proper body mechanics    > Spine precautions - no bending, twisting, lifting   -  For Home discharge   -  Dressing changed today   -  Case d/w

## 2023-04-15 NOTE — CONSULT NOTE ADULT - ASSESSMENT
Confidential Drug Utilization Report  Search Terms: Andrez Thomas, 1955Search Date: 04/15/2023 11:47:51 AM  The Drug Utilization Report below displays all of the controlled substance prescriptions, if any, that your patient has filled in the last twelve months. The information displayed on this report is compiled from pharmacy submissions to the Department, and accurately reflects the information as submitted by the pharmacies.    This report was requested by: Gabi Paz | Reference #: [id number]    There are no results for the search terms that you entered.

## 2023-04-16 LAB
A1C WITH ESTIMATED AVERAGE GLUCOSE RESULT: 6 % — HIGH (ref 4–5.6)
ALBUMIN SERPL ELPH-MCNC: 2.7 G/DL — LOW (ref 3.5–5)
ALP SERPL-CCNC: 57 U/L — SIGNIFICANT CHANGE UP (ref 40–120)
ALT FLD-CCNC: 23 U/L DA — SIGNIFICANT CHANGE UP (ref 10–60)
ANION GAP SERPL CALC-SCNC: 5 MMOL/L — SIGNIFICANT CHANGE UP (ref 5–17)
AST SERPL-CCNC: 15 U/L — SIGNIFICANT CHANGE UP (ref 10–40)
BILIRUB SERPL-MCNC: 0.6 MG/DL — SIGNIFICANT CHANGE UP (ref 0.2–1.2)
BUN SERPL-MCNC: 17 MG/DL — SIGNIFICANT CHANGE UP (ref 7–18)
CALCIUM SERPL-MCNC: 8.3 MG/DL — LOW (ref 8.4–10.5)
CHLORIDE SERPL-SCNC: 106 MMOL/L — SIGNIFICANT CHANGE UP (ref 96–108)
CHOLEST SERPL-MCNC: 145 MG/DL — SIGNIFICANT CHANGE UP
CO2 SERPL-SCNC: 30 MMOL/L — SIGNIFICANT CHANGE UP (ref 22–31)
CREAT SERPL-MCNC: 0.8 MG/DL — SIGNIFICANT CHANGE UP (ref 0.5–1.3)
EGFR: 96 ML/MIN/1.73M2 — SIGNIFICANT CHANGE UP
ESTIMATED AVERAGE GLUCOSE: 126 MG/DL — HIGH (ref 68–114)
GLUCOSE SERPL-MCNC: 118 MG/DL — HIGH (ref 70–99)
HCT VFR BLD CALC: 44.3 % — SIGNIFICANT CHANGE UP (ref 39–50)
HDLC SERPL-MCNC: 40 MG/DL — LOW
HGB BLD-MCNC: 14.4 G/DL — SIGNIFICANT CHANGE UP (ref 13–17)
LIPID PNL WITH DIRECT LDL SERPL: 84 MG/DL — SIGNIFICANT CHANGE UP
MCHC RBC-ENTMCNC: 29.8 PG — SIGNIFICANT CHANGE UP (ref 27–34)
MCHC RBC-ENTMCNC: 32.5 GM/DL — SIGNIFICANT CHANGE UP (ref 32–36)
MCV RBC AUTO: 91.7 FL — SIGNIFICANT CHANGE UP (ref 80–100)
NON HDL CHOLESTEROL: 105 MG/DL — SIGNIFICANT CHANGE UP
NRBC # BLD: 0 /100 WBCS — SIGNIFICANT CHANGE UP (ref 0–0)
PLATELET # BLD AUTO: 136 K/UL — LOW (ref 150–400)
POTASSIUM SERPL-MCNC: 4.1 MMOL/L — SIGNIFICANT CHANGE UP (ref 3.5–5.3)
POTASSIUM SERPL-SCNC: 4.1 MMOL/L — SIGNIFICANT CHANGE UP (ref 3.5–5.3)
PROT SERPL-MCNC: 6 G/DL — SIGNIFICANT CHANGE UP (ref 6–8.3)
RBC # BLD: 4.83 M/UL — SIGNIFICANT CHANGE UP (ref 4.2–5.8)
RBC # FLD: 13 % — SIGNIFICANT CHANGE UP (ref 10.3–14.5)
SODIUM SERPL-SCNC: 141 MMOL/L — SIGNIFICANT CHANGE UP (ref 135–145)
TRIGL SERPL-MCNC: 106 MG/DL — SIGNIFICANT CHANGE UP
TSH SERPL-MCNC: 1.81 UU/ML — SIGNIFICANT CHANGE UP (ref 0.34–4.82)
WBC # BLD: 4.21 K/UL — SIGNIFICANT CHANGE UP (ref 3.8–10.5)
WBC # FLD AUTO: 4.21 K/UL — SIGNIFICANT CHANGE UP (ref 3.8–10.5)

## 2023-04-16 RX ORDER — TAMSULOSIN HYDROCHLORIDE 0.4 MG/1
0.4 CAPSULE ORAL DAILY
Refills: 0 | Status: ACTIVE | OUTPATIENT
Start: 2023-04-16 | End: 2024-03-14

## 2023-04-16 RX ORDER — ACETAMINOPHEN 500 MG
1000 TABLET ORAL EVERY 6 HOURS
Refills: 0 | Status: ACTIVE | OUTPATIENT
Start: 2023-04-16 | End: 2024-03-14

## 2023-04-16 RX ADMIN — APIXABAN 2.5 MILLIGRAM(S): 2.5 TABLET, FILM COATED ORAL at 05:17

## 2023-04-16 RX ADMIN — Medication 1000 MILLIGRAM(S): at 22:10

## 2023-04-16 RX ADMIN — GABAPENTIN 300 MILLIGRAM(S): 400 CAPSULE ORAL at 21:15

## 2023-04-16 RX ADMIN — Medication 1000 MILLIGRAM(S): at 21:18

## 2023-04-16 RX ADMIN — Medication 1000 MILLIGRAM(S): at 05:48

## 2023-04-16 RX ADMIN — POLYETHYLENE GLYCOL 3350 17 GRAM(S): 17 POWDER, FOR SOLUTION ORAL at 12:23

## 2023-04-16 RX ADMIN — Medication 1000 MILLIGRAM(S): at 10:25

## 2023-04-16 RX ADMIN — METHOCARBAMOL 750 MILLIGRAM(S): 500 TABLET, FILM COATED ORAL at 21:15

## 2023-04-16 RX ADMIN — GABAPENTIN 300 MILLIGRAM(S): 400 CAPSULE ORAL at 13:48

## 2023-04-16 RX ADMIN — Medication 1000 MILLIGRAM(S): at 05:17

## 2023-04-16 RX ADMIN — PANTOPRAZOLE SODIUM 40 MILLIGRAM(S): 20 TABLET, DELAYED RELEASE ORAL at 12:24

## 2023-04-16 RX ADMIN — GABAPENTIN 300 MILLIGRAM(S): 400 CAPSULE ORAL at 05:19

## 2023-04-16 RX ADMIN — Medication 1000 MILLIGRAM(S): at 10:01

## 2023-04-16 RX ADMIN — MAGNESIUM HYDROXIDE 30 MILLILITER(S): 400 TABLET, CHEWABLE ORAL at 12:23

## 2023-04-16 RX ADMIN — APIXABAN 2.5 MILLIGRAM(S): 2.5 TABLET, FILM COATED ORAL at 17:25

## 2023-04-16 RX ADMIN — METHOCARBAMOL 750 MILLIGRAM(S): 500 TABLET, FILM COATED ORAL at 13:48

## 2023-04-16 RX ADMIN — SENNA PLUS 2 TABLET(S): 8.6 TABLET ORAL at 21:16

## 2023-04-16 RX ADMIN — TAMSULOSIN HYDROCHLORIDE 0.4 MILLIGRAM(S): 0.4 CAPSULE ORAL at 12:23

## 2023-04-16 NOTE — PROGRESS NOTE ADULT - SUBJECTIVE AND OBJECTIVE BOX
67yMale    Diagnosis:  S/p L4-L5 Laminectomy and Fusion POD#6    Patient was seen and evaluated at bedside. Patient with no acute complaints.   Pain is  well controlled, off narcotics.   Pain is controlled via Tylenol PRN, Gabapentin, Methocarbamol.  Patient had urinary retention overnight, bladder scan showed 650cc done by RN, followed by straight-cath ~800cc's urine drained.  Denies CP/SOB, dyspnea, N/V/D, palpitations, or bladder/bowel changes. Patient denies any new paresthesias.    Vital Signs Last 24 Hrs  T(C): 36.3 (16 Apr 2023 06:06), Max: 37.2 (15 Apr 2023 13:22)  T(F): 97.3 (16 Apr 2023 06:06), Max: 98.9 (15 Apr 2023 13:22)  HR: 46 (16 Apr 2023 06:06) (46 - 79)  BP: 123/63 (16 Apr 2023 06:06) (119/62 - 130/74)  BP(mean): 77 (16 Apr 2023 06:06) (77 - 77)  RR: 18 (16 Apr 2023 06:06) (16 - 18)  SpO2: 96% (16 Apr 2023 06:06) (93% - 98%)    Parameters below as of 16 Apr 2023 06:06  Patient On (Oxygen Delivery Method): room air      I&O's Detail    16 Apr 2023 07:01  -  16 Apr 2023 09:59  --------------------------------------------------------  IN:  Total IN: 0 mL    OUT:    Voided (mL): 800 mL  Total OUT: 800 mL    Total NET: -800 mL          Physical Exam:    General: AAOx3, NAD, sitting OOB in a chair.    Back: Dressing is C/D/I. Skin is pink and warm. No erythema. SILT.    Lower extremity:  No calf tenderness, calves are soft. 2+pulses. NVI. EHL/TA/gastrocnemius intact B/L. Good capillary refill. Warm, well-perfused.                           14.4   4.21  )-----------( 136      ( 16 Apr 2023 04:55 )             44.3     04-16    141  |  106  |  17  ----------------------------<  118<H>  4.1   |  30  |  0.80    Ca    8.3<L>      16 Apr 2023 04:55    TPro  6.0  /  Alb  2.7<L>  /  TBili  0.6  /  DBili  x   /  AST  15  /  ALT  23  /  AlkPhos  57  04-16

## 2023-04-16 NOTE — PROGRESS NOTE ADULT - ASSESSMENT
Impression:  67yMale   1. S/p L4-L5 Laminectomy/Fusion POD#6   2. post op DVT right gastrocnemius on Eliquis 2.5mg  3. Bradycardia - pulmonary and cardiology follow up appreciated  4. HAMIDA not on CPAP  5. BPH- continue Flomax 0.4mg (Simulosin not on formulary)  monitor urine output, bladder scan in 8 hours, if over 600cc, insert churchill catheter.  Plan:  -  Pain management recommendations acknowledged, pain well controlled.  -  Daily Physical Theapy:  OOB to chair, ambulate WBAT w/ walker  -  Discharge planning: Home when medically stable  -  Case d/w DR. Rivas   Impression:  67yMale   1. S/p L4-L5 Laminectomy/Fusion POD#6   2. post op DVT right gastrocnemius on Eliquis 2.5mg  3. Bradycardia - cardiology follow up appreciated  4. Obstructive Sleep Apnea - CPAP reordered by Dr. Rivero  5. BPH- continue Flomax 0.4mg (Simulosin not on formulary)  monitor urine output, bladder scan in 8 hours, if over 600cc, insert churchill catheter.  Plan:  -  Pain management recommendations acknowledged, pain well controlled.  -  Daily Physical Theapy:  OOB to chair, ambulate WBAT w/ walker  -  Discharge planning: Home when medically stable  -  Case d/w DR. Rivas  -  if Churchill catheter is reinserted patient will need to be discharged home with catheter and follow up with Urology outpatient (Dr. Norma Raymond)

## 2023-04-16 NOTE — PROGRESS NOTE ADULT - PROBLEM SELECTOR PLAN 4
PSG and PFTs as OP  not compliant with C-pap machine since he had Bariatric surgery
PSG and PFTs as OP  not compliant with C-pap machine since he had Bariatric surgery  C-pap machine at night with supp oxygen
PSG and PFTs as OP  not compliant with C-pap machine since he had Bariatric surgery
pSG and PFTs as OP  not compliant with C-pap machine since he had Bariatric surgery

## 2023-04-16 NOTE — PROGRESS NOTE ADULT - PROBLEM SELECTOR PLAN 5
anticoagulation  monitor for bleeding  Hem/onc eval (second DVT since 2019)
anticoagulation  monitor for bleeding  Hem/onc eval (second DVT since 2019)

## 2023-04-16 NOTE — PROGRESS NOTE ADULT - PROBLEM SELECTOR PLAN 6
likely secondary to HAMIDA  Tele monitoring  Cardio follow up  C-pap machine at night likely secondary to HAMIDA  Tele monitoring  Echo  Cardio follow up  C-pap machine at night

## 2023-04-16 NOTE — PROGRESS NOTE ADULT - ASSESSMENT
67 yr old male with PMHX of HLD (hyperlipidemia),Obstructive sleep apnea on C PAP-no longer using CPAP machine,Migraine headache,Bilateral otitis media,  HTN (hypertension),DVT (deep venous thrombosis)LLE in 2019 treated with eliquis for 6 months,Prediabetes-pt states A1c is 6. denies current use of meds,Lumbar herniated disc  who is s/p l4-5 fusion with DVT found postop. Overnight pt with bradycardia in 30's.  1.Bradycardia due to  HAMIDA-CPAP.  2.DVT-tx as per neurosurgery.  3.Prediabetes-diet.  4.Pain management.  5.No av blocking agents.  6.PPI.  7.Tele monitoring  8.Echocardiogram.

## 2023-04-16 NOTE — PROGRESS NOTE ADULT - SUBJECTIVE AND OBJECTIVE BOX
pt seen in icu [  ], reg med floor [ x  ], bed [ x ], chair at bedside [   ]  Awake, alert, comfortable in bed in NAD  REVIEW OF SYSTEMS:    CONSTITUTIONAL: No weakness, fevers or chills  EYES/ENT: No visual changes;  No vertigo or throat pain   NECK: No pain or stiffness  RESPIRATORY: No cough, wheezing, hemoptysis; No shortness of breath  CARDIOVASCULAR: No chest pain or palpitations  GASTROINTESTINAL: No abdominal or epigastric pain. No nausea, vomiting, or hematemesis; No diarrhea or constipation. No melena or hematochezia.  GENITOURINARY: No dysuria, frequency or hematuria  NEUROLOGICAL: No numbness or weakness  SKIN: No itching, burning, rashes, or lesions   All other review of systems is negative unless indicated above.    Physical Exam    General: WN/WD NAD  Neurology: A&Ox3, nonfocal, KONG x 4  Respiratory: CTA B/L  CV: RRR, S1S2, no murmurs, rubs or gallops  Abdominal: Soft, NT, ND +BS, Last BM  Extremities: No edema, + peripheral pulses      Allergies  No Known Allergies      Health Issues  Dorsalgia    Borderline hypertension    HLD (hyperlipidemia)    Obstructive sleep apnea on C PAP    Migraine headache    Bilateral otitis media    HTN (hypertension)    DVT (deep venous thrombosis)    HLD (hyperlipidemia)    Vertigo    Chronic mastoiditis    Unspecified cholesteatoma, right ear    Otalgia, right ear    HTN (hypertension)    Poor historian    Renal cyst, left    Prediabetes    Lumbar herniated disc    History of umbilical hernia repair    Hx of umbilical hernia repair 2010    History of weight loss surgery    S/P shoulder surgery    S/P UPPP (uvulopalatopharyngoplasty)    H/O prostate biopsy        Vitals  T(F): 97.3 (04-16-23 @ 06:06), Max: 98.9 (04-15-23 @ 13:22)  HR: 46 (04-16-23 @ 06:06) (46 - 79)  BP: 123/63 (04-16-23 @ 06:06) (119/62 - 130/74)  RR: 18 (04-16-23 @ 06:06) (16 - 18)  SpO2: 96% (04-16-23 @ 06:06) (93% - 98%)  Wt(kg): --  CAPILLARY BLOOD GLUCOSE          Labs                          14.4   4.21  )-----------( 136      ( 16 Apr 2023 04:55 )             44.3       04-16    141  |  106  |  17  ----------------------------<  118<H>  4.1   |  30  |  0.80    Ca    8.3<L>      16 Apr 2023 04:55    TPro  6.0  /  Alb  2.7<L>  /  TBili  0.6  /  DBili  x   /  AST  15  /  ALT  23  /  AlkPhos  57  04-16            Radiology Results  < from:  Duplex Venous Lower Ext Ltd, Right (04.14.23 @ 15:58) >  IMPRESSION:  Acute DVT right gastrocnemius vein.    < end of copied text >      Meds    MEDICATIONS  (STANDING):  apixaban 2.5 milliGRAM(s) Oral two times a day  gabapentin 300 milliGRAM(s) Oral every 8 hours  magnesium hydroxide Suspension 30 milliLiter(s) Oral daily  methocarbamol 750 milliGRAM(s) Oral every 8 hours  pantoprazole  Injectable 40 milliGRAM(s) IV Push daily  polyethylene glycol 3350 17 Gram(s) Oral daily  senna 2 Tablet(s) Oral at bedtime  tamsulosin 0.4 milliGRAM(s) Oral daily      MEDICATIONS  (PRN):  acetaminophen     Tablet .. 1000 milliGRAM(s) Oral every 6 hours PRN Moderate Pain (4 - 6)  ondansetron Injectable 4 milliGRAM(s) IV Push every 6 hours PRN Nausea and/or Vomiting

## 2023-04-16 NOTE — PROGRESS NOTE ADULT - SUBJECTIVE AND OBJECTIVE BOX
CHIEF COMPLAINT:Patient is a 67y old  Male who presents with a chief complaint of s/p L4-L5 Fusion.Pt wore cpap lastnight.    	  REVIEW OF SYSTEMS:  CONSTITUTIONAL: No fever, weight loss, or fatigue  EYES: No eye pain, visual disturbances, or discharge  ENT:  No difficulty hearing, tinnitus, vertigo; No sinus or throat pain  NECK: No pain or stiffness  RESPIRATORY: No cough, wheezing, chills or hemoptysis; No Shortness of Breath  CARDIOVASCULAR: No chest pain, palpitations, passing out, dizziness, or leg swelling  GASTROINTESTINAL: No abdominal or epigastric pain. No nausea, vomiting, or hematemesis; No diarrhea or constipation. No melena or hematochezia.  GENITOURINARY: No dysuria, frequency, hematuria, or incontinence  NEUROLOGICAL: No headaches, memory loss, loss of strength, numbness, or tremors  SKIN: No itching, burning, rashes, or lesions   LYMPH Nodes: No enlarged glands  ENDOCRINE: No heat or cold intolerance; No hair loss  MUSCULOSKELETAL: No joint pain or swelling; No muscle, back, or extremity pain  PSYCHIATRIC: No depression, anxiety, mood swings, or difficulty sleeping  HEME/LYMPH: No easy bruising, or bleeding gums  ALLERGY AND IMMUNOLOGIC: No hives or eczema	        PHYSICAL EXAM:  T(C): 36.3 (04-16-23 @ 06:06), Max: 37.2 (04-15-23 @ 13:22)  HR: 46 (04-16-23 @ 06:06) (46 - 79)  BP: 123/63 (04-16-23 @ 06:06) (119/62 - 130/74)  RR: 18 (04-16-23 @ 06:06) (16 - 18)  SpO2: 96% (04-16-23 @ 06:06) (93% - 98%)  Wt(kg): --  I&O's Summary    16 Apr 2023 07:01  -  16 Apr 2023 11:25  --------------------------------------------------------  IN: 0 mL / OUT: 800 mL / NET: -800 mL        Appearance: Normal	  HEENT:   Normal oral mucosa, PERRL, EOMI	  Lymphatic: No lymphadenopathy  Cardiovascular: Normal S1 S2, No JVD, No murmurs, No edema  Respiratory: Lungs clear to auscultation	  Psychiatry: A & O x 3, Mood & affect appropriate  Gastrointestinal:  Soft, Non-tender, + BS	  Skin: No rashes, No ecchymoses, No cyanosis	  Neurologic: Non-focal  Extremities: Normal range of motion, No clubbing, cyanosis or edema  Vascular: Peripheral pulses palpable 2+ bilaterally    MEDICATIONS  (STANDING):  apixaban 2.5 milliGRAM(s) Oral two times a day  gabapentin 300 milliGRAM(s) Oral every 8 hours  magnesium hydroxide Suspension 30 milliLiter(s) Oral daily  methocarbamol 750 milliGRAM(s) Oral every 8 hours  pantoprazole  Injectable 40 milliGRAM(s) IV Push daily  polyethylene glycol 3350 17 Gram(s) Oral daily  senna 2 Tablet(s) Oral at bedtime  tamsulosin 0.4 milliGRAM(s) Oral daily      TELEMETRY: sinus 40-60's	      LABS:	 	                       14.4   4.21  )-----------( 136      ( 16 Apr 2023 04:55 )             44.3     04-16    141  |  106  |  17  ----------------------------<  118<H>  4.1   |  30  |  0.80    Ca    8.3<L>      16 Apr 2023 04:55    TPro  6.0  /  Alb  2.7<L>  /  TBili  0.6  /  DBili  x   /  AST  15  /  ALT  23  /  AlkPhos  57  04-16    proBNP:   Lipid Profile: Cholesterol 145  LDL --  HDL 40    Ldl calc 84  Ratio --    HgA1c:   TSH: Thyroid Stimulating Hormone, Serum: 1.81 uU/mL (04-16 @ 04:55)

## 2023-04-17 VITALS — OXYGEN SATURATION: 98 % | DIASTOLIC BLOOD PRESSURE: 74 MMHG | SYSTOLIC BLOOD PRESSURE: 151 MMHG | HEART RATE: 77 BPM

## 2023-04-17 PROCEDURE — 76000 FLUOROSCOPY <1 HR PHYS/QHP: CPT

## 2023-04-17 PROCEDURE — 93971 EXTREMITY STUDY: CPT

## 2023-04-17 PROCEDURE — C1889: CPT

## 2023-04-17 PROCEDURE — 87635 SARS-COV-2 COVID-19 AMP PRB: CPT

## 2023-04-17 PROCEDURE — 80061 LIPID PANEL: CPT

## 2023-04-17 PROCEDURE — 85610 PROTHROMBIN TIME: CPT

## 2023-04-17 PROCEDURE — 86900 BLOOD TYPING SEROLOGIC ABO: CPT

## 2023-04-17 PROCEDURE — 86850 RBC ANTIBODY SCREEN: CPT

## 2023-04-17 PROCEDURE — 80053 COMPREHEN METABOLIC PANEL: CPT

## 2023-04-17 PROCEDURE — 97530 THERAPEUTIC ACTIVITIES: CPT

## 2023-04-17 PROCEDURE — 85730 THROMBOPLASTIN TIME PARTIAL: CPT

## 2023-04-17 PROCEDURE — 93005 ELECTROCARDIOGRAM TRACING: CPT

## 2023-04-17 PROCEDURE — 36415 COLL VENOUS BLD VENIPUNCTURE: CPT

## 2023-04-17 PROCEDURE — 93306 TTE W/DOPPLER COMPLETE: CPT

## 2023-04-17 PROCEDURE — 99232 SBSQ HOSP IP/OBS MODERATE 35: CPT

## 2023-04-17 PROCEDURE — C1769: CPT

## 2023-04-17 PROCEDURE — 93306 TTE W/DOPPLER COMPLETE: CPT | Mod: 26

## 2023-04-17 PROCEDURE — 94660 CPAP INITIATION&MGMT: CPT

## 2023-04-17 PROCEDURE — 97162 PT EVAL MOD COMPLEX 30 MIN: CPT

## 2023-04-17 PROCEDURE — C1713: CPT

## 2023-04-17 PROCEDURE — 83036 HEMOGLOBIN GLYCOSYLATED A1C: CPT

## 2023-04-17 PROCEDURE — 97116 GAIT TRAINING THERAPY: CPT

## 2023-04-17 PROCEDURE — 86901 BLOOD TYPING SEROLOGIC RH(D): CPT

## 2023-04-17 PROCEDURE — 85027 COMPLETE CBC AUTOMATED: CPT

## 2023-04-17 PROCEDURE — 72100 X-RAY EXAM L-S SPINE 2/3 VWS: CPT

## 2023-04-17 PROCEDURE — 82962 GLUCOSE BLOOD TEST: CPT

## 2023-04-17 PROCEDURE — 84443 ASSAY THYROID STIM HORMONE: CPT

## 2023-04-17 PROCEDURE — 85379 FIBRIN DEGRADATION QUANT: CPT

## 2023-04-17 RX ORDER — APIXABAN 2.5 MG/1
1 TABLET, FILM COATED ORAL
Qty: 42 | Refills: 0
Start: 2023-04-17 | End: 2023-05-07

## 2023-04-17 RX ADMIN — MAGNESIUM HYDROXIDE 30 MILLILITER(S): 400 TABLET, CHEWABLE ORAL at 13:18

## 2023-04-17 RX ADMIN — METHOCARBAMOL 750 MILLIGRAM(S): 500 TABLET, FILM COATED ORAL at 05:12

## 2023-04-17 RX ADMIN — METHOCARBAMOL 750 MILLIGRAM(S): 500 TABLET, FILM COATED ORAL at 13:18

## 2023-04-17 RX ADMIN — GABAPENTIN 300 MILLIGRAM(S): 400 CAPSULE ORAL at 13:19

## 2023-04-17 RX ADMIN — GABAPENTIN 300 MILLIGRAM(S): 400 CAPSULE ORAL at 05:12

## 2023-04-17 RX ADMIN — APIXABAN 2.5 MILLIGRAM(S): 2.5 TABLET, FILM COATED ORAL at 05:12

## 2023-04-17 RX ADMIN — PANTOPRAZOLE SODIUM 40 MILLIGRAM(S): 20 TABLET, DELAYED RELEASE ORAL at 11:44

## 2023-04-17 RX ADMIN — TAMSULOSIN HYDROCHLORIDE 0.4 MILLIGRAM(S): 0.4 CAPSULE ORAL at 11:43

## 2023-04-17 RX ADMIN — APIXABAN 2.5 MILLIGRAM(S): 2.5 TABLET, FILM COATED ORAL at 17:59

## 2023-04-17 RX ADMIN — POLYETHYLENE GLYCOL 3350 17 GRAM(S): 17 POWDER, FOR SOLUTION ORAL at 11:44

## 2023-04-17 NOTE — PROGRESS NOTE ADULT - PROBLEM SELECTOR PROBLEM 3
HLD (hyperlipidemia)
Lumbar disc herniation

## 2023-04-17 NOTE — PROGRESS NOTE ADULT - PROBLEM SELECTOR PROBLEM 8
Severe obesity with body mass index (BMI) of 35.0 to 39.9 with comorbidity

## 2023-04-17 NOTE — PROGRESS NOTE ADULT - PROBLEM SELECTOR PROBLEM 2
HTN (hypertension)
S/P lumbar laminectomy
HTN (hypertension)
S/P lumbar laminectomy
S/P lumbar laminectomy

## 2023-04-17 NOTE — PROGRESS NOTE ADULT - PROBLEM SELECTOR PROBLEM 6
Bradycardia
HTN (hypertension)
Bradycardia
HTN (hypertension)

## 2023-04-17 NOTE — PROGRESS NOTE ADULT - SUBJECTIVE AND OBJECTIVE BOX
Pt Name: ABEBA SCHULTZ  MRN: [id number]        Orthopedic diagnosis:  - S/p L4-5 lami and fusion POD#7    24 hr interval:    Patient seen and evaluated at bedside.   Patient with complaints of pain localized to lower back, rated a 2/10 in severity non radiating, denies paresthesias.  Patient voiding independently     [  DENIES   ] bladder/bowel incontinence or changes  [  DENIES   ] saddle-like paresthesias  denies cp/sob/palpitations      PHYSICAL EXAM:  General; Awake and alert, Oriented x 3  Hips/Pelvis:   Able to SLR bilaterally. Negative log roll, heel strike. No pain on passive Int/Ext hip rotation.  Spine exam:  Back:   - Skin warm and pink. Dressing C/D/I. No surrounding erythema.   Extremities:          -Left Upper Extremity: Atraumatic with normal alignment NROM. No crepitus. No bony tenderness.      -Right Upper Extremity: Atraumatic with normal alignment NROM. No crepitus. No bony tenderness.      -Left Lower Extremity: Atraumatic with normal alignment NROM. No crepitus. No bony tenderness. No calf tenderness, Calf soft.     -Right Lower Extremity: Atraumatic with normal alignment NROM. No crepitus. No bony tenderness.  No calf tenderness, Calf soft.         >Sensation:  intact to light touch           >Motor exam:          >Bilateral Upper extremities    Delt      Bicep      Tri      Wrist ext  Wrst Flex       Digit Ext Digit Flex                                                   R         5/5        5/5        5/5       5/5            5/5            5/5       5/5          5/5                                                   L          5/5        5/5        5/5       5/5            5/5            5/5       5/5          5/5         >Bilateral Lower ext.     Hip Flx  Hip Ext    Quad   Hamstrg   TA       EHL      GS                                          R        5/5        5/5        5/5        5/5          5/5     5/5      5/5                                          L         5/5        5/5        4/5        5/5          5/5     5/5      5/5      A: Pt is a  67y Male s/p L4-5 Laminectomy and fusion pod#7    Plan:  - Recommendation  - Pain control     > Patient was started on eliquis 2.5mg BID for RLE DVT   -  Daily PT- WBAT of the lower ext, proper body mechanics    > Spine precautions - no bending, twisting, lifting   -  For Home discharge   -  Cardiology consulted yesterday for bradycardia - Echo was recommended     > Echo to be performed today   -  Case d/w

## 2023-04-17 NOTE — PROGRESS NOTE ADULT - PROBLEM SELECTOR PROBLEM 4
Lumbar stenosis
HAMIDA on CPAP
Lumbar stenosis
Lumbar stenosis

## 2023-04-17 NOTE — PROGRESS NOTE ADULT - PROBLEM SELECTOR PROBLEM 5
Lumbar radiculopathy
DVT, lower extremity
Lumbar radiculopathy
DVT, lower extremity

## 2023-04-17 NOTE — PROGRESS NOTE ADULT - ASSESSMENT
Data Detail Level: Printer-Friendly View Extended View  Confidential Drug Utilization Report  Search Terms: Andrez Thomas, 1955Search Date: 04/11/2023 08:38:54 AM  The Drug Utilization Report below displays all of the controlled substance prescriptions, if any, that your patient has filled in the last twelve months. The information displayed on this report is compiled from pharmacy submissions to the Department, and accurately reflects the information as submitted by the pharmacies.    This report was requested by: Lisa Connelly | Reference #: [id number]    There are no results for the search terms that you entered.

## 2023-04-17 NOTE — PROGRESS NOTE ADULT - PROBLEM SELECTOR PROBLEM 1
S/P lumbar laminectomy
Postoperative back pain
Postoperative back pain
S/P lumbar laminectomy
S/P lumbar laminectomy
Postoperative back pain
Postoperative back pain
S/P lumbar laminectomy
Postoperative back pain

## 2023-04-17 NOTE — PROGRESS NOTE ADULT - PROVIDER SPECIALTY LIST ADULT
Neurosurgery
Cardiology
Internal Medicine
Neurosurgery
Neurosurgery
Orthopedics
Cardiology
Neurosurgery
Pain Medicine
Internal Medicine
Pain Medicine
Internal Medicine
Pain Medicine

## 2023-04-17 NOTE — PROGRESS NOTE ADULT - REASON FOR ADMISSION
L4-L5 fusion
L4-L5 fusion
Lumbar fusion
S/P LS-spine fusion POD#4
Laminectomy
Spine fusion
Bradycardia
s/p L4-L5 Fusion
Laminectomy

## 2023-04-17 NOTE — PROGRESS NOTE ADULT - PROBLEM SELECTOR PLAN 1
Pt with acute postop lumbar back pain greatest over left lumbar back which is somatic and neuropathic in nature due to s/p L4-L5 laminectomy and fusion with posterior instrumentation on 4/10, POD #7. Found to have acute DVT right gastrocnemius vein. Pt on Eliquis.   Opioid pain recommendations   - Discontinued oxycodone 4/11.  - Discontinued dilaudid 4/12.   - Discontinued Tramadol 4/13.  - Morphine 15mg PO q4h PRN severe pain d/c'd 4/15 for bradycardia   Non-opioid pain recommendations   - NSIADs per surgical team   - Continue Acetaminophen 1 gram PO q 6h PRN moderate pain. Monitor LFTs  - Continue Gabapentin 300mg po q 8 hours. Monitor renal function.   - Continue methocarbamol 750mg PO TID per neurosurgery.   Bowel Regimen  - Discontinue Miralax 17G PO daily (pt no longer on opioids)  - Discontinue Senna 2 tablets at bedtime (pt no longer on opioids)  Mild pain   - Non-pharmacological pain treatment recommendations  - Warm/ Cool packs PRN   - Repositioning, imagery, relaxation, distraction.  - Physical therapy OOB if no contraindications   Recommendations discussed with primary team and RN.

## 2023-04-17 NOTE — PROGRESS NOTE ADULT - SUBJECTIVE AND OBJECTIVE BOX
Source of information: ABEBA SCHULTZ, Chart review  Patient language: Slovenian  : n/a Pt also understands and communicates in basic English     HPI:      Patient is a 67y old  Male with PMH HTN HLD obesity, HAMIDA, HTN lumbar disc herniation, lumbar stenosis, lumbar radiculopathy who presents for a scheduled lumbar laminectomy. Pt s/p L4-L5 laminectomy and fusion with posterior instrumentation on 4/10, POD #7. Found to have acute DVT right gastrocnemius vein. Pt on Eliquis. Pain consulted for postop back pain. Pt seen and examined at bedside. Pt sitting in chair, eating dinner, denies lumbar back pain at this time. Reports back pain is tolerable with current pain regimen SCALE USED: (1-10 VNRS). Pt is no longer on opioid pain medications. Pt tolerating PO diet. Denies lethargy, chest pain, SOB, nausea, vomiting, constipation. Reports last BM 4/17. Patient stated goal for pain control: to be able to take deep breaths, get out of bed to chair and ambulate with tolerable pain control. Reports reports ambulating laps in hallway with tolerable pain. Pt denies taking medications for pain at home. Plan to be discharged home today.     PAST MEDICAL & SURGICAL HISTORY:  HLD (hyperlipidemia)      Obstructive sleep apnea on C PAP  no longer using CPAP machine      Migraine headache      Bilateral otitis media      HTN (hypertension)      DVT (deep venous thrombosis)  LLE in 2019 treated with eliquis for 6 months      HLD (hyperlipidemia)      Vertigo      Chronic mastoiditis  right ear      Unspecified cholesteatoma, right ear      Otalgia, right ear      HTN (hypertension)      Poor historian      Renal cyst, left      Prediabetes  pt states A1c is 6. denies current use of meds      Lumbar herniated disc      History of umbilical hernia repair  x2      History of weight loss surgery  5 years ago in [place]      S/P shoulder surgery  left shoulder      S/P UPPP (uvulopalatopharyngoplasty)  >5 years ago      H/O prostate biopsy          FAMILY HISTORY:  FH: asthma  daughter, brother        Social History:   [X ] Denies ETOH use, illicit drug use and smoking    Allergies    No Known Allergies    MEDICATIONS  (STANDING):  apixaban 2.5 milliGRAM(s) Oral two times a day  gabapentin 300 milliGRAM(s) Oral every 8 hours  magnesium hydroxide Suspension 30 milliLiter(s) Oral daily  methocarbamol 750 milliGRAM(s) Oral every 8 hours  pantoprazole  Injectable 40 milliGRAM(s) IV Push daily  polyethylene glycol 3350 17 Gram(s) Oral daily  senna 2 Tablet(s) Oral at bedtime  tamsulosin 0.4 milliGRAM(s) Oral daily    MEDICATIONS  (PRN):  acetaminophen     Tablet .. 1000 milliGRAM(s) Oral every 6 hours PRN Moderate Pain (4 - 6)  ondansetron Injectable 4 milliGRAM(s) IV Push every 6 hours PRN Nausea and/or Vomiting      Vital Signs Last 24 Hrs  T(C): 36.8 (17 Apr 2023 13:44), Max: 36.9 (17 Apr 2023 00:22)  T(F): 98.2 (17 Apr 2023 13:44), Max: 98.4 (17 Apr 2023 00:22)  HR: 77 (17 Apr 2023 14:18) (60 - 80)  BP: 151/74 (17 Apr 2023 14:18) (107/61 - 151/74)  BP(mean): 78 (17 Apr 2023 05:49) (78 - 78)  RR: 18 (17 Apr 2023 13:44) (17 - 18)  SpO2: 98% (17 Apr 2023 14:18) (94% - 99%)    Parameters below as of 17 Apr 2023 14:18  Patient On (Oxygen Delivery Method): room air      COVID-19 PCR: NotDetec (10 Apr 2023 10:10)    LABS: Reviewed                          14.4   4.21  )-----------( 136      ( 16 Apr 2023 04:55 )             44.3     04-16    141  |  106  |  17  ----------------------------<  118<H>  4.1   |  30  |  0.80    Ca    8.3<L>      16 Apr 2023 04:55    TPro  6.0  /  Alb  2.7<L>  /  TBili  0.6  /  DBili  x   /  AST  15  /  ALT  23  /  AlkPhos  57  04-16      LIVER FUNCTIONS - ( 16 Apr 2023 04:55 )  Alb: 2.7 g/dL / Pro: 6.0 g/dL / ALK PHOS: 57 U/L / ALT: 23 U/L DA / AST: 15 U/L / GGT: x           LABS: Reviewed    CAPILLARY BLOOD GLUCOSE    COVID-19 PCR: NotDetec (10 Apr 2023 10:10)    Radiology: Reviewed.   < from: US Duplex Venous Lower Ext Ltd, Right (04.14.23 @ 15:58) >    ACC: [id number] EXAM:  US DPLX LWR EXT VEINS LTD RT   ORDERED BY: TONI MCINTOSH     PROCEDURE DATE:  04/14/2023          INTERPRETATION:  CLINICAL INFORMATION: Right calf pain. History of recent   lumbar spinal fusion surgery. Evaluate for DVT.    COMPARISON: None available.    TECHNIQUE: Duplex sonography of the RIGHT LOWER extremity veins with   color and spectral Doppler, with and without compression.    FINDINGS:    There is normal compressibility of the right common femoral, femoral and   popliteal veins.  The contralateral common femoral vein is patent.  Doppler examination shows normal spontaneous and phasic flow.    There is patency of the posterior tibial vein and peroneal vein. The   right soleal vein is not visualized, precluding assessment.    The gastrocnemius vein within the calf appears distended with mixed   echogenicity thrombus, which is noncompressible, most consistent with   acute DVT.    IMPRESSION:  Acute DVT right gastrocnemius vein.      Results were discussed with KIKE Ellison 4/14/2023 4:12 PM by the   sonographer upon completion with read back confirmation.    --- End of Report ---            PRASHANT HERNANDEZ MD; Attending Radiologist  This document has been electronically signed. Apr 14 2023  4:13PM    < end of copied text >    < from: CT Head No Cont (01.29.20 @ 14:43) >  EXAM:  CT BRAIN                            PROCEDURE DATE:  01/29/2020          INTERPRETATION:  .    CLINICAL INFORMATION: headache/ dizziness    TECHNIQUE: Multiple axial CT images of the head were obtained without contrast. Sagittal and coronal reconstructed images were acquired from the source data.    COMPARISON: No prior CT studies of the brain are available for comparison at this institution.    FINDINGS: There is no acute intracranial hemorrhage, mass effect, shift of the midline structures, herniation, extra-axial fluid collection, or hydrocephalus.    The paranasal sinuses are clear. There is complete opacification of the right-sided mastoid air cells along with opacification of the middle ear cavity. There is decreased pneumatization of the bilateral mastoid tips with associated sclerosis. The orbits are unremarkable. The calvarium is intact.    IMPRESSION: No acute intracranial hemorrhage, mass effect, or shift of the midline structures.    Findings which may reflect chronicright-sided mastoiditis. Clinical correlation is required.                ELENA AMEZQUITA M.D., ATTENDING RADIOLOGIST  This document has been electronically signed. Jan 29 2020  2:51PM              < end of copied text >      ORT Score -   Family Hx of substance abuse	Female	      Male  Alcohol 	                                           1                     3  Illegal drugs	                                   2                     3  Rx drugs                                           4 	                  4  Personal Hx of substance abuse		  Alcohol 	                                          3	                  3  Illegal drugs                                     4	                  4  Rx drugs                                            5 	                  5  Age between 16- 45 years	           1                     1  hx preadolescent sexual abuse	   3 	                  0  Psychological disease		  ADD, OCD, bipolar, schizophrenia   2	          2  Depression                                           1 	          1  Total: 0    a score of 3 or lower indicates low risk for opioid abuse		  a score of 4-7 indicates moderate risk for opioid abuse		  a score of 8 or higher indicates high risk for opioid abuse    REVIEW OF SYSTEMS:  CONSTITUTIONAL: No fever or fatigue  HEENT:  No difficulty hearing, no change in vision  NECK: No pain or stiffness  RESPIRATORY: No cough, wheezing, chills or hemoptysis; No shortness of breath  CARDIOVASCULAR: No chest pain, palpitations, dizziness, + right lower leg swelling  GASTROINTESTINAL: No loss of appetite, decreased PO intake. No abdominal or epigastric pain. No nausea no vomiting; No diarrhea or constipation. +hx GERD,   GENITOURINARY: No dysuria, frequency, hematuria, retention or incontinence  MUSCULOSKELETAL: + occasional lumbar back pain; no upper or lower motor strength weakness, no saddle anesthesia, bowel/bladder incontinence, no falls   NEURO: No headaches, No numbness/tingling b/l LE, No weakness    PHYSICAL EXAM:  GENERAL:  Alert & Oriented X4, cooperative, NAD, Good concentration. Speech is clear.   RESPIRATORY: Respirations even and unlabored. Clear to auscultation bilaterally; No rales, rhonchi, wheezing, or rubs   CARDIOVASCULAR: Normal S1/S2, regular rate and rhythm; No murmurs, rubs, or gallops. No JVD.   GASTROINTESTINAL: + obese per BMI, Soft, Nontender, Nondistended; Bowel sounds present  PERIPHERAL VASCULAR:  Extremities warm with mild right lower leg edema. 2+ Peripheral Pulses, No cyanosis, No calf tenderness  MUSCULOSKELETAL: Motor Strength 5/5 B/L upper and 4/5 lower extremities; ROM intact. + left lumbar back tenderness on palpation.   SKIN: Warm, dry, intact. No rashes, lesions, scars. +lumbar back surgical gauze dressing x 2 c/d/i    Risk factors associated with adverse outcomes related to opioid treatment  [ ]  Concurrent benzodiazepine use  [ ]  History/ Active substance use or alcohol use disorder  [ ] Psychiatric co-morbidity  [X] Sleep apnea  [ ] COPD  [X ] BMI> 35  [ ] Liver dysfunction  [ ] Renal dysfunction  [ ] CHF  [ ] Smoker  [X ]  Age > 60 years    [X ]  [place]  Reviewed and Copied to Chart. See below.    Plan of care and goal oriented pain management treatment options were discussed with patient and /or primary care giver; all questions and concerns were addressed and care was aligned with patient's wishes.    Educated patient on goal oriented pain management treatment options     04-17-23 @ 16:59

## 2023-04-17 NOTE — PROGRESS NOTE ADULT - SUBJECTIVE AND OBJECTIVE BOX
Patient is a 67y old  Male who presents with a chief complaint of Spine fusion (17 Apr 2023 11:13)    pt seen in icu [  ], surg tele floor [ x  ], bed [  ], chair at bedside [  x ], a+o x3 [ x ], lethargic [  ],  nad [ x ]    Allergies    No Known Allergies        Vitals    T(F): 98.2 (04-17-23 @ 13:44), Max: 98.4 (04-17-23 @ 00:22)  HR: 77 (04-17-23 @ 14:18) (60 - 80)  BP: 151/74 (04-17-23 @ 14:18) (107/61 - 151/74)  RR: 18 (04-17-23 @ 13:44) (17 - 18)  SpO2: 98% (04-17-23 @ 14:18) (94% - 99%)  Wt(kg): --  CAPILLARY BLOOD GLUCOSE          Labs                          14.4   4.21  )-----------( 136      ( 16 Apr 2023 04:55 )             44.3       04-16    141  |  106  |  17  ----------------------------<  118<H>  4.1   |  30  |  0.80    Ca    8.3<L>      16 Apr 2023 04:55    TPro  6.0  /  Alb  2.7<L>  /  TBili  0.6  /  DBili  x   /  AST  15  /  ALT  23  /  AlkPhos  57  04-16                Radiology Results      Meds    MEDICATIONS  (STANDING):  apixaban 2.5 milliGRAM(s) Oral two times a day  gabapentin 300 milliGRAM(s) Oral every 8 hours  magnesium hydroxide Suspension 30 milliLiter(s) Oral daily  methocarbamol 750 milliGRAM(s) Oral every 8 hours  pantoprazole  Injectable 40 milliGRAM(s) IV Push daily  tamsulosin 0.4 milliGRAM(s) Oral daily      MEDICATIONS  (PRN):  acetaminophen     Tablet .. 1000 milliGRAM(s) Oral every 6 hours PRN Moderate Pain (4 - 6)  ondansetron Injectable 4 milliGRAM(s) IV Push every 6 hours PRN Nausea and/or Vomiting      Physical Exam    Neuro :  no focal deficits  Respiratory: CTA B/L  CV: RRR, S1S2, no murmurs,   Abdominal: Soft, NT, ND +BS,  Extremities: No edema, right calf tenderness, + peripheral pulses        ASSESSMENT      right le dvt  bradycardia due to  HAMIDA-CPAP  Lumbar herniated disc   h/o HLD (hyperlipidemia)  Obstructive sleep apnea on C PAP  Migraine headache  HTN (hypertension)  DVT (deep venous thrombosis)  Prediabetes  Lumbar herniated disc  S/P shoulder surgery  S/P UPPP (uvulopalatopharyngoplasty)  prostate biopsy        PLAN    cont eliquis 2.5mg bid as per neuro surg   cardio f/u   No av blocking agents.  cont Tele monitoring  f/u Echocardiogram.  s/p L4-L5 laminectomy and fusion with posterior instrumentation on 4/10   Daily PT- WBAT of the lower ext, proper body mechanics  Spine precautions - no bending, twisting, lifting   oob as tolerated   incentive spirometer   cpap for hamida  cont current meds

## 2023-04-27 ENCOUNTER — APPOINTMENT (OUTPATIENT)
Dept: NEUROSURGERY | Facility: CLINIC | Age: 68
End: 2023-04-27
Payer: COMMERCIAL

## 2023-04-27 VITALS
BODY MASS INDEX: 38.09 KG/M2 | HEIGHT: 63 IN | WEIGHT: 215 LBS | HEART RATE: 74 BPM | TEMPERATURE: 97.8 F | SYSTOLIC BLOOD PRESSURE: 145 MMHG | DIASTOLIC BLOOD PRESSURE: 82 MMHG | OXYGEN SATURATION: 94 %

## 2023-04-27 PROCEDURE — 99024 POSTOP FOLLOW-UP VISIT: CPT

## 2023-04-27 RX ORDER — APIXABAN 5 MG/1
5 TABLET, FILM COATED ORAL TWICE DAILY
Qty: 60 | Refills: 0 | Status: ACTIVE | COMMUNITY
Start: 2023-04-27 | End: 1900-01-01

## 2023-04-27 NOTE — PHYSICAL EXAM
[General Appearance - Alert] : alert [General Appearance - In No Acute Distress] : in no acute distress [Longitudinal] : longitudinal [Clean] : clean [Dry] : dry [Healing Well] : healing well [Well-Healed] : well-healed [Intact] : intact [Sutures Intact] : closed with intact sutures [No Drainage] : without drainage

## 2023-05-01 NOTE — REASON FOR VISIT
[Family Member] : family member [de-identified] : L4-L5 FUSION WITH POSTERIOR INSTRUMENTATION  [de-identified] : 04/14/23

## 2023-05-01 NOTE — REVIEW OF SYSTEMS
[As Noted in HPI] : as noted in HPI [Numbness] : numbness [Tingling] : tingling [Abnormal Sensation] : an abnormal sensation [As noted in HPI] : as noted in HPI [Negative] : Heme/Lymph [de-identified] : back pain

## 2023-05-01 NOTE — ASSESSMENT
Encounter Date: 6/6/2022       History     Chief Complaint   Patient presents with    Abdominal Pain    Vomiting    Diarrhea     Pt presents with generalized abd pain with n/v/d. Onset Friday.  Denies fever.  Gastric sleeve in April/ dr oneil in [facility].      49-year-old female with a history of gastric sleeve in April of this year performed in [place] presents to the emergency department with persistent nausea, vomiting, and diarrhea after eating fried food earlier in the week in.  She denies any fever.  Reports generalized abdominal pain.  Denies any hematochezia or melena.    The history is provided by the patient and a relative.   Abdominal Pain  The current episode started several days ago. The onset of the illness was abrupt. The problem has not changed since onset.The abdominal pain is generalized. The abdominal pain does not radiate. The severity of the abdominal pain is 8/10. The abdominal pain is relieved by nothing. The other symptoms of the illness include vomiting and diarrhea. The other symptoms of the illness do not include fatigue, shortness of breath, dysuria, hematemesis or hematochezia.   Symptoms associated with the illness do not include chills or back pain.   Emesis   Associated symptoms include abdominal pain and diarrhea. Pertinent negatives include no chills.   Diarrhea   Associated symptoms include abdominal pain and vomiting. Pertinent negatives include no chills.     Review of patient's allergies indicates:  No Known Allergies  History reviewed. No pertinent past medical history.  Past Surgical History:   Procedure Laterality Date    Gastric sleeve       History reviewed. No pertinent family history.     Review of Systems   Constitutional: Negative for chills and fatigue.   Respiratory: Negative for chest tightness and shortness of breath.    Cardiovascular: Negative for chest pain.   Gastrointestinal: Positive for abdominal pain, diarrhea and vomiting. Negative for blood in stool,  [FreeTextEntry1] : Mr. Archer is a 69 y/o, male, post op L4-L5 fusion with posterior instrumentation. His low back pain and leg pain has much improved since surgery. \par - Pt experienced RLE DVT, will increase eliquis from 2.5 mg to 5 mg BID. Pt tp f/u with pcp for further management of DVT. \par - Referral for PT. \par - xray lumbar spine to f/u on fusion \par - Continue to take methocarbamol for muscle spasms and tramadol as needed for pain\par - f/u in 8 weeks.  hematemesis and hematochezia.   Genitourinary: Negative for dysuria.   Musculoskeletal: Negative for back pain.   Skin: Negative for wound.   Allergic/Immunologic: Negative for immunocompromised state.   All other systems reviewed and are negative.      Physical Exam     Initial Vitals [06/06/22 0812]   BP Pulse Resp Temp SpO2   131/87 (!) 124 20 97.5 °F (36.4 °C) 96 %      MAP       --         Physical Exam    Nursing note and vitals reviewed.  Constitutional: She appears well-developed and well-nourished. No distress.   HENT:   Head: Normocephalic and atraumatic.   Mouth/Throat: Oropharynx is clear and moist.   Eyes: Conjunctivae are normal. Pupils are equal, round, and reactive to light. No scleral icterus.   Neck: No JVD present.   Normal range of motion.  Cardiovascular: Regular rhythm and normal pulses. Tachycardia present.    Pulmonary/Chest: Breath sounds normal. No respiratory distress.   Abdominal: Abdomen is soft. She exhibits no distension. There is no abdominal tenderness (Left lateral).   Musculoskeletal:         General: No edema.      Cervical back: Normal range of motion.     Neurological: She is alert and oriented to person, place, and time.   Skin: Skin is warm and dry. No rash noted.         ED Course   Procedures  Labs Reviewed   COMPREHENSIVE METABOLIC PANEL - Abnormal; Notable for the following components:       Result Value    Chloride 111 (*)     Carbon Dioxide 12 (*)     Glucose Level 140 (*)     Creatinine 1.86 (*)     Calcium Level Total 10.3 (*)     Protein Total 9.1 (*)     Globulin 4.2 (*)     Alkaline Phosphatase 159 (*)     Alanine Aminotransferase 62 (*)     Aspartate Aminotransferase 48 (*)     All other components within normal limits   PHOSPHORUS - Abnormal; Notable for the following components:    Phosphorus Level 5.6 (*)     All other components within normal limits   CBC WITH DIFFERENTIAL - Abnormal; Notable for the following components:    RBC 5.44 (*)     Hgb 16.5 (*)      Hct 50.6 (*)     MCHC 32.6 (*)     IG# 0.03 (*)     All other components within normal limits   LIPASE - Normal   MAGNESIUM - Normal   CBC W/ AUTO DIFFERENTIAL    Narrative:     The following orders were created for panel order CBC auto differential.  Procedure                               Abnormality         Status                     ---------                               -----------         ------                     CBC with Differential[[id number]]        Abnormal            Final result                 Please view results for these tests on the individual orders.   URINALYSIS, REFLEX TO URINE CULTURE   SARS-COV-2 RNA AMPLIFICATION, QUAL     EKG Readings: (Independently Interpreted)   Initial Reading: No STEMI. Rhythm: Sinus Tachycardia. Heart Rate: 102. Ectopy: No Ectopy. ST Segment Depression: V3, V4, V5 and V6. T Waves Flipped: II and III.   06/06/2022 @ 1217       Imaging Results    None          Medications   sodium bicarbonate 150 mEq in dextrose 5 % 1,000 mL infusion (has no administration in time range)   thiamine (B-1) 100 mg in dextrose 5 % 100 mL IVPB (has no administration in time range)   lactated ringers bolus 1,000 mL (1,000 mLs Intravenous New Bag 6/6/22 0852)   ondansetron injection 8 mg (8 mg Intravenous Given 6/6/22 0852)     Medical Decision Making:   Initial Assessment:   Ms. Randall presented for evaluation of acute nausea vomiting, and diarrhea in setting of dietary indiscretion following bariatric surgery.  Tachycardic on arrival but improving with IV fluids.  Blood pressure stable at this time.  Mild abdominal tenderness without peritoneal signs.  Will obtain labs, provide fluids, antiemetics and plan to discuss with bariatric surgery team.  Differential Diagnosis:   Acute kidney injury, dehydration, electrolyte derangements, gastroenteritis  Independently Interpreted Test(s):   I have ordered and independently interpreted EKG Reading(s) - see prior notes  Clinical Tests:   Lab Tests:  Ordered and Reviewed       <> Summary of Lab: Acute kidney injury, metabolic acidosis, hemoconcentrated CBC  Medical Tests: Ordered and Reviewed  ED Management:  Labs consistent with volume depletion/dehydration with significant metabolic acidosis.  Her symptoms are somewhat improved after antiemetics.  Will admit for further IV fluid resuscitation, electrolyte correction.  Case discussed with bariatric surgery/general surgery team, they will evaluate and follow in consultation as needed.  No indication of acute surgical process at this time of her exam is benign, no indication of an obstruction as she is having frequent diarrheal bowel movements. Findings and plan discussed with the patient and she is agreeable to admission at this time.                ED Course as of 06/30/22 1604   Mon Jun 06, 2022   0958 Discussed with Nestor Dejesus NP - bariatric team will follow in consultation [KS]      ED Course User Index  [KS] Karina Leon MD             Clinical Impression:   Final diagnoses:  [N17.9] Acute kidney injury  [E86.0] Dehydration (Primary)  [E87.2] Metabolic acidosis  [R11.2, R19.7] Nausea vomiting and diarrhea  [Z90.3] H/O gastric sleeve          ED Disposition Condition    Admit               Karina Leon MD  06/30/22 4545

## 2023-05-01 NOTE — HISTORY OF PRESENT ILLNESS
[FreeTextEntry1] : 04/27/23: Mr. Thomas is a 67 y/o, male, here for f/u post op L4-L5 fusion with posterior instrumentation 04/14/23. Post op pt experienced RLE DVT and has been taking eliquis 2.5 mg BID. He reports his RLE calve pain has much improved, denies any SOB, CP. Overall his back pain has improved. He continues to have numbness, tingling and sensitivity of thighs. He is having regular BM at home. He has been taking muscle relaxer and tramadol.\par \par 02/22/23: Mr. Archer is a 66 y/o, male with low back radiating to b/l LEs. His pain is worsened by lying or sitting long periods. The pain in his legs is his most bothersome symptom. His pain improves by walking. He has numbness of b/l thighs, and a burning sensation on his feet and toes. He has been taking tramadol for his symptoms. He denies urine/fecal incontinence.

## 2023-05-10 RX ORDER — APIXABAN 2.5 MG/1
1 TABLET, FILM COATED ORAL
Qty: 120 | Refills: 0
Start: 2023-05-10 | End: 2023-07-08

## 2023-06-16 ENCOUNTER — APPOINTMENT (OUTPATIENT)
Dept: OTOLARYNGOLOGY | Facility: CLINIC | Age: 68
End: 2023-06-16
Payer: COMMERCIAL

## 2023-06-16 VITALS
SYSTOLIC BLOOD PRESSURE: 136 MMHG | DIASTOLIC BLOOD PRESSURE: 86 MMHG | OXYGEN SATURATION: 94 % | TEMPERATURE: 97.8 F | HEIGHT: 63 IN | RESPIRATION RATE: 18 BRPM | HEART RATE: 83 BPM | BODY MASS INDEX: 38.09 KG/M2 | WEIGHT: 215 LBS

## 2023-06-16 PROCEDURE — 95992 CANALITH REPOSITIONING PROC: CPT

## 2023-06-16 PROCEDURE — 99214 OFFICE O/P EST MOD 30 MIN: CPT | Mod: 25

## 2023-06-16 RX ORDER — FENOFIBRATE 145 MG/1
TABLET ORAL
Refills: 0 | Status: ACTIVE | COMMUNITY

## 2023-06-16 RX ORDER — ALFUZOSIN HYDROCHLORIDE 10 MG/1
10 TABLET, EXTENDED RELEASE ORAL
Refills: 0 | Status: ACTIVE | COMMUNITY

## 2023-06-16 RX ORDER — AMLODIPINE BESYLATE 5 MG/1
TABLET ORAL
Refills: 0 | Status: ACTIVE | COMMUNITY

## 2023-06-16 RX ORDER — METFORMIN HYDROCHLORIDE 625 MG/1
TABLET ORAL
Refills: 0 | Status: ACTIVE | COMMUNITY

## 2023-06-16 RX ORDER — ATORVASTATIN CALCIUM 80 MG/1
TABLET, FILM COATED ORAL
Refills: 0 | Status: ACTIVE | COMMUNITY

## 2023-06-16 NOTE — ASSESSMENT
[FreeTextEntry1] : Epley maneuver performed in the office today for recurrent right BPV.  Rx Augmentin and Floxin drops for right acute OM with drainage, follow-up 3 to 4 weeks for reassessment.

## 2023-06-16 NOTE — HISTORY OF PRESENT ILLNESS
[de-identified] : 68 year old male presents with follow-up for  tinnitus and vertigo. \par Reports vertigo has been worse, has been dizzy for the past 4 weeks- lasing for seconds to mins\par Tried taking meclizine with no relief \par Patient describes “dizzy” as rocking, spinning, swaying, bobbing, near-faint, lightheaded\par Patient feels off balance only with walking or moving\par States changes in motion, rolling in bed, getting up quickly bring it on\par Reports constant tinnitus \par Denies prior treatments include meclizine, vestibular therapy,  prior audiogram, vestibular tests, or MRI/ CT scan\par Denies otalgia, hearing loss, otalgia, ear fullness, fevers, falls or hospitalizations. \par \par

## 2023-06-16 NOTE — PROCEDURE
[FreeTextEntry3] : Epley maneuver\par \par Jun 16, 2023 \par \par While sitting up, the patient’s head was turned about 45 degrees to the right side. The patient was quickly laid down backwards with their head hanging over the edge of the examining table. The head was kept in this position for 30 seconds and then turned 90 degrees to the opposite side. After another 30 seconds, the head and the body were turned together in the same direction so that the head was pointing down toward the ground at a 45 degree angle. After 30 seconds in this position, the patient was brought upright again.

## 2023-06-16 NOTE — PHYSICAL EXAM
[Binocular Microscopic Exam] : Binocular microscopic exam was performed [Normal] : the left external ear was normal [FreeTextEntry6] : Debris in right ear canal, sampled for culture [FreeTextEntry9] : wax accumulation [de-identified] : inflamed; fluid behind ear drum

## 2023-06-16 NOTE — REASON FOR VISIT
[Subsequent Evaluation] : a subsequent evaluation for [Patient Declined  Services] : - None: Patient declined  services [Source: ______] : History obtained from [unfilled] [FreeTextEntry2] : tinnitus and vertigo.  [FreeTextEntry3] : Patient declined offer of translation service. Patient preferred to use accompanying family member/friend for translation.  [TWNoteComboBox1] : Colombian

## 2023-06-21 RX ORDER — AMOXICILLIN AND CLAVULANATE POTASSIUM 875; 125 MG/1; MG/1
875-125 TABLET, COATED ORAL
Qty: 20 | Refills: 0 | Status: DISCONTINUED | COMMUNITY
Start: 2020-08-13 | End: 2023-06-21

## 2023-06-22 ENCOUNTER — APPOINTMENT (OUTPATIENT)
Dept: NEUROSURGERY | Facility: CLINIC | Age: 68
End: 2023-06-22
Payer: COMMERCIAL

## 2023-06-22 VITALS
DIASTOLIC BLOOD PRESSURE: 82 MMHG | HEIGHT: 63 IN | TEMPERATURE: 98.4 F | OXYGEN SATURATION: 94 % | HEART RATE: 70 BPM | BODY MASS INDEX: 38.09 KG/M2 | SYSTOLIC BLOOD PRESSURE: 139 MMHG | WEIGHT: 215 LBS

## 2023-06-22 DIAGNOSIS — M54.9 DORSALGIA, UNSPECIFIED: ICD-10-CM

## 2023-06-22 PROCEDURE — 99024 POSTOP FOLLOW-UP VISIT: CPT

## 2023-06-26 LAB — EAR NOSE AND THROAT CULTURE: NORMAL

## 2023-06-29 PROBLEM — M54.9 PAIN IN BACK: Status: ACTIVE | Noted: 2022-10-04

## 2023-06-29 NOTE — ASSESSMENT
[FreeTextEntry1] : Overall he is doing well after surgery. He will continue therapy and I will see him in 8 weeks.  He will continue with the anticoagulation for his DVT.

## 2023-07-20 ENCOUNTER — APPOINTMENT (OUTPATIENT)
Dept: OTOLARYNGOLOGY | Facility: CLINIC | Age: 68
End: 2023-07-20
Payer: COMMERCIAL

## 2023-07-20 VITALS
TEMPERATURE: 98 F | WEIGHT: 215 LBS | HEIGHT: 63 IN | HEART RATE: 70 BPM | DIASTOLIC BLOOD PRESSURE: 77 MMHG | BODY MASS INDEX: 38.09 KG/M2 | SYSTOLIC BLOOD PRESSURE: 117 MMHG

## 2023-07-20 DIAGNOSIS — H61.22 IMPACTED CERUMEN, LEFT EAR: ICD-10-CM

## 2023-07-20 DIAGNOSIS — H61.21 IMPACTED CERUMEN, RIGHT EAR: ICD-10-CM

## 2023-07-20 PROCEDURE — 95992 CANALITH REPOSITIONING PROC: CPT

## 2023-07-20 PROCEDURE — 99213 OFFICE O/P EST LOW 20 MIN: CPT | Mod: 25

## 2023-07-20 RX ORDER — APIXABAN 5 MG/1
TABLET, FILM COATED ORAL
Refills: 0 | Status: COMPLETED | COMMUNITY
End: 2023-07-20

## 2023-07-20 RX ORDER — TRAMADOL HYDROCHLORIDE 25 MG/1
TABLET, COATED ORAL
Refills: 0 | Status: COMPLETED | COMMUNITY
End: 2023-07-20

## 2023-07-20 RX ORDER — OFLOXACIN OTIC 3 MG/ML
0.3 SOLUTION AURICULAR (OTIC) TWICE DAILY
Qty: 1 | Refills: 1 | Status: COMPLETED | COMMUNITY
Start: 2022-09-15 | End: 2023-07-20

## 2023-07-20 NOTE — PROCEDURE
[FreeTextEntry3] : Procedure note:  Right cerumenectomy\par \par Description of Procedure:  Cerumen impaction was noted requiring debridement under the operating microscope using otologic instrumentation.  The patient tolerated the procedure without complications.\par \par Epley maneuver\par \par While sitting up, the patient´s head was turned about 45 degrees to the right side. The patient was quickly laid down backwards with their head hanging over the edge of the examining table. The head was kept in this position for 30 seconds and then turned 90 degrees to the opposite side. After another 30 seconds, the head and the body were turned together in the same direction so that the head was pointing down toward the ground at a 45 degree angle. After 30 seconds in this position, the patient was brought upright again. \par

## 2023-07-20 NOTE — HISTORY OF PRESENT ILLNESS
[de-identified] : 68 year old male presents with follow-up for tinnitus and vertigo. \par Reports vertigo has slightly improved especially when changing positions quickly.\par Continues to have episodes when lying down to go to bed. \par Continues to have constant tinnitus in right ear. \par Reports slight worsening hearing loss in right ear. \par Finished course of Augmentin and Floxin drops with relief. \par Denies otalgia, otorrhea, ear infections, headaches related to hearing.

## 2023-07-20 NOTE — REASON FOR VISIT
[Subsequent Evaluation] : a subsequent evaluation for [[employer] ] : provided by [employer]   [Source: ______] : History obtained from [unfilled] [FreeTextEntry2] : tinnitus and vertigo.  [Interpreters_IDNumber] : [id number] [Interpreters_FullName] : Luz [TWNoteComboBox1] : [place]

## 2023-07-20 NOTE — ASSESSMENT
[FreeTextEntry1] : Still complaining of vertigo, drainage is resolved.  Exam today shows intact right tympanic membrane reconstruction with some bulging of superior aspect of reconstruction, patent dry subannular tube.  Maud-Hallpike again positive for nystagmus with head tilt to the right.  Bilateral facial nerve function normal.\par \par New Epley maneuver performed today, referral to vestibular therapy placed.  CT temporal bone/MRI diffusion-weighted ordered to assess for recurrent cholesteatoma given prominence of superior aspect of eardrum.  Follow-up 1 month.

## 2023-07-21 NOTE — ASSESSMENT
DEPARTMENT OF OTOLARYNGOLOGY, HEAD AND NECK SURGERY      MD Bakari Butler MD Maria Carratola, MD Alan Sticker, MD            CONTACT   PHONE:   [phone] [address] [place] [address]               Patient Instructions After Ear Tube Placement     What to expect after surgery     Drainage from the ears:  This is normal after placement of ear tubes.  Drainage may continue for up to 1 week after surgery and it may even be bloody at times.  Wipe away the drainage as needed and continue using the ear drops as instructed.   Fever:  This may happen during the first 1-2 days after surgery.  If you have a temperature greater than 101.5 that does not respond to treatment with your oral pain medication/Tylenol, notify your MD   Pain:  It is common to have some pain. Continue using ear drops as directed and use over the counter pain medication as instructed below.     Diet:     In general, patients can resume a normal diet after ear tube.     Activity:     Patients can resume normal activity after ear tube.  Try to avoid submerging the ears in water in the bathtub during bathtime   Discuss the need for ear plugs with your physician, some physicians do recommend ear plugs when swimming after ear tubes      Medication:     Use the antibiotic ear drops as directed: In general, you can follow the rule of 3's: 3 drops in each ear, 3 times per day for 3 days   If the drainage from the ears continues after the third day, you should continue using the ear drops another week.   If drainage continues after 10 days of ear drops, notify your physician.   Use over the counter Tylenol and/or ibuprofen as directed for pain control.      Reasons to Call your surgeon     Persistent fever of 101.5 or higher   Severe pain that has increased greatly since the surgery or is uncontrolled by your prescription pain medication.   Significant amounts of bleeding from the ears and/or nose   Any other  [FreeTextEntry1] : Plan: F/u in 6 weeks significant concerns

## 2023-08-09 ENCOUNTER — APPOINTMENT (OUTPATIENT)
Dept: MRI IMAGING | Facility: CLINIC | Age: 68
End: 2023-08-09
Payer: COMMERCIAL

## 2023-08-09 ENCOUNTER — OUTPATIENT (OUTPATIENT)
Dept: OUTPATIENT SERVICES | Facility: HOSPITAL | Age: 68
LOS: 1 days | End: 2023-08-09
Payer: COMMERCIAL

## 2023-08-09 DIAGNOSIS — H90.5 UNSPECIFIED SENSORINEURAL HEARING LOSS: ICD-10-CM

## 2023-08-09 DIAGNOSIS — Z98.890 OTHER SPECIFIED POSTPROCEDURAL STATES: Chronic | ICD-10-CM

## 2023-08-09 DIAGNOSIS — Z98.84 BARIATRIC SURGERY STATUS: Chronic | ICD-10-CM

## 2023-08-09 PROCEDURE — 70553 MRI BRAIN STEM W/O & W/DYE: CPT | Mod: 26

## 2023-08-09 PROCEDURE — 70553 MRI BRAIN STEM W/O & W/DYE: CPT

## 2023-08-09 PROCEDURE — A9585: CPT

## 2023-08-16 NOTE — DISCHARGE NOTE ADULT - NS DC ANGIO PCI YN
Admission Reconciliation is Completed  Discharge Reconciliation is Not Complete no Admission Reconciliation is Completed  Discharge Reconciliation is Completed

## 2023-08-23 ENCOUNTER — APPOINTMENT (OUTPATIENT)
Dept: OTOLARYNGOLOGY | Facility: CLINIC | Age: 68
End: 2023-08-23
Payer: COMMERCIAL

## 2023-08-23 VITALS
SYSTOLIC BLOOD PRESSURE: 122 MMHG | BODY MASS INDEX: 37.21 KG/M2 | HEART RATE: 77 BPM | HEIGHT: 63 IN | WEIGHT: 210 LBS | DIASTOLIC BLOOD PRESSURE: 75 MMHG

## 2023-08-23 PROCEDURE — 99214 OFFICE O/P EST MOD 30 MIN: CPT | Mod: 25

## 2023-08-23 PROCEDURE — 92567 TYMPANOMETRY: CPT

## 2023-08-23 PROCEDURE — 92557 COMPREHENSIVE HEARING TEST: CPT

## 2023-08-23 PROCEDURE — 92504 EAR MICROSCOPY EXAMINATION: CPT

## 2023-08-24 NOTE — ASSESSMENT
[FreeTextEntry1] : 68-year-old presents for follow-up for right chronic ear disease.  Since last visit he has undergone MRI which shows a focus of restricted diffusion in the right epitympanum and mastoid cavity concerning for recurrent cholesteatoma.  Reports resolution of his right ear pain since that visit.  Exam today shows intact right tympanic membrane without visible cholesteatoma debris and patent PE tube, no visible retraction.  bilateral facial function is normal.  I personally ordered reviewed new audiogram today, which shows a right mixed hearing loss.  Because of the focus of abnormal restricted diffusion on MRI, combined with the patient's history of recurrent ear drainage, I recommended right-sided revision surgery.  Given the focus of restricted diffusion appears large, I told patient I would favor a right canal wall down tympanomastoidectomy prevent the need for future procedures given his advanced age.  He expressed understanding and wishes to proceed.  We will obtain a temporal bone CT prior to surgery to ensure no other anterior or tegmen related complications.  Discussion of tympanoplasty with mastoidectomy surgery risks, benefits, and alternatives:  Risks, benefits, and alternatives of tympanoplasty with mastoidectomy were discussed with the patient.  Risks would include but are not limited to bleeding, infection, persistent pain, persistent perforation, persistent drainage, residual or recurrent cholesteatoma, failure to improve hearing, worsened hearing in including risk of profound hearing loss, persistent dizziness, persistent tinnitus, facial nerve injury, taste changes, cerebrospinal fluid leak, or other intracranial injury/infection.  Benefits would include removal of cholesteatoma, prevention of cholesteatoma-related complications (including persistent ear drainage, intracranial extension causing meningitis or other infection, worsened hearing loss including sensorineural hearing loss, dizziness, tinnitus, facial nerve compression causing weakness), reduction or elimination of drainage, and possible improvement in hearing.  Alternatives would include observation or hearing aid use.  The patient agrees to proceed with surgery.

## 2023-08-24 NOTE — HISTORY OF PRESENT ILLNESS
[de-identified] :  68 year old male presents with follow-up for tinnitus and vertigo. Last seen 07/2023- New Epley maneuver performed  Pending Vestibular Rehab Therapy on 08/28/23  Reports vertigo has been constant for 3 months, now on meclazine with some relief.  Continues to have episodes when lying down to go to bed. Continues to have constant tinnitus in right ear. Reports slight worsening hearing loss in right ear. Finished course of Augmentin and Floxin drops with relief. Denies otalgia, otorrhea, ear infections, headaches related to hearing.  MRI Brain and internal Audioty canals 08/09/23  IMPRESSION: Restricted diffusion within the RIGHT epitympanum suspicious for presence of cholesteatoma.

## 2023-08-30 ENCOUNTER — OUTPATIENT (OUTPATIENT)
Dept: OUTPATIENT SERVICES | Facility: HOSPITAL | Age: 68
LOS: 1 days | End: 2023-08-30
Payer: COMMERCIAL

## 2023-08-30 ENCOUNTER — APPOINTMENT (OUTPATIENT)
Dept: CT IMAGING | Facility: IMAGING CENTER | Age: 68
End: 2023-08-30
Payer: COMMERCIAL

## 2023-08-30 DIAGNOSIS — Z98.890 OTHER SPECIFIED POSTPROCEDURAL STATES: Chronic | ICD-10-CM

## 2023-08-30 DIAGNOSIS — Z00.8 ENCOUNTER FOR OTHER GENERAL EXAMINATION: ICD-10-CM

## 2023-08-30 DIAGNOSIS — H93.291 OTHER ABNORMAL AUDITORY PERCEPTIONS, RIGHT EAR: ICD-10-CM

## 2023-08-30 DIAGNOSIS — Z98.84 BARIATRIC SURGERY STATUS: Chronic | ICD-10-CM

## 2023-08-30 PROCEDURE — 70480 CT ORBIT/EAR/FOSSA W/O DYE: CPT

## 2023-08-30 PROCEDURE — 70480 CT ORBIT/EAR/FOSSA W/O DYE: CPT | Mod: 26

## 2023-09-05 ENCOUNTER — NON-APPOINTMENT (OUTPATIENT)
Age: 68
End: 2023-09-05

## 2023-09-11 ENCOUNTER — NON-APPOINTMENT (OUTPATIENT)
Age: 68
End: 2023-09-11

## 2023-09-19 ENCOUNTER — APPOINTMENT (OUTPATIENT)
Dept: NEUROSURGERY | Facility: CLINIC | Age: 68
End: 2023-09-19
Payer: COMMERCIAL

## 2023-09-19 VITALS
BODY MASS INDEX: 37.21 KG/M2 | OXYGEN SATURATION: 95 % | TEMPERATURE: 98 F | WEIGHT: 210 LBS | HEIGHT: 63 IN | HEART RATE: 81 BPM | SYSTOLIC BLOOD PRESSURE: 125 MMHG | DIASTOLIC BLOOD PRESSURE: 83 MMHG

## 2023-09-19 PROCEDURE — 99213 OFFICE O/P EST LOW 20 MIN: CPT

## 2023-09-19 RX ORDER — METHOCARBAMOL 750 MG/1
750 TABLET, FILM COATED ORAL 3 TIMES DAILY
Qty: 90 | Refills: 0 | Status: ACTIVE | COMMUNITY
Start: 2023-09-19 | End: 1900-01-01

## 2023-11-16 ENCOUNTER — APPOINTMENT (OUTPATIENT)
Dept: NEUROSURGERY | Facility: CLINIC | Age: 68
End: 2023-11-16
Payer: COMMERCIAL

## 2023-11-16 PROCEDURE — 99215 OFFICE O/P EST HI 40 MIN: CPT

## 2023-11-16 RX ORDER — TRAMADOL HYDROCHLORIDE 50 MG/1
50 TABLET, COATED ORAL
Qty: 90 | Refills: 0 | Status: ACTIVE | COMMUNITY
Start: 2023-11-16 | End: 1900-01-01

## 2023-12-05 NOTE — CONSULT NOTE ADULT - SUBJECTIVE AND OBJECTIVE BOX
CHIEF COMPLAINT:Patient is a 67y old  Male who presents with a chief complaint of L4-L5 fusion (15 Apr 2023 08:25)      HPI:Patient is a 67 yr old male with PMHX of HLD (hyperlipidemia),Obstructive sleep apnea on C PAP-no longer using CPAP machine,Migraine headache,Bilateral otitis media,  HTN (hypertension),DVT (deep venous thrombosis)LLE in 2019 treated with eliquis for 6 months,Prediabetes-pt states A1c is 6. denies current use of meds,Lumbar herniated disc  who is s/p l4-5 fusion with DVT found postop. Overnight pt with bradycardia in 30's.He denies any chest pain,sob,dizziness or LOC.            PAST MEDICAL & SURGICAL HISTORY:  HLD (hyperlipidemia)      Obstructive sleep apnea on C PAP  no longer using CPAP machine      Migraine headache      Bilateral otitis media      HTN (hypertension)      DVT (deep venous thrombosis)  LLE in 2019 treated with eliquis for 6 months      HLD (hyperlipidemia)      Vertigo      Chronic mastoiditis  right ear      Unspecified cholesteatoma, right ear      Otalgia, right ear      HTN (hypertension)      Poor historian      Renal cyst, left      Prediabetes  pt states A1c is 6. denies current use of meds      Lumbar herniated disc      History of umbilical hernia repair  x2      History of weight loss surgery  5 years ago in [place]      S/P shoulder surgery  left shoulder      S/P UPPP (uvulopalatopharyngoplasty)  >5 years ago      H/O prostate biopsy          MEDICATIONS  (STANDING):  acetaminophen     Tablet .. 1000 milliGRAM(s) Oral every 8 hours  apixaban 2.5 milliGRAM(s) Oral two times a day  gabapentin 300 milliGRAM(s) Oral every 8 hours  magnesium hydroxide Suspension 30 milliLiter(s) Oral daily  methocarbamol 750 milliGRAM(s) Oral every 8 hours  pantoprazole  Injectable 40 milliGRAM(s) IV Push daily  polyethylene glycol 3350 17 Gram(s) Oral daily  senna 2 Tablet(s) Oral at bedtime    MEDICATIONS  (PRN):  ondansetron Injectable 4 milliGRAM(s) IV Push every 6 hours PRN Nausea and/or Vomiting      FAMILY HISTORY:  FH: asthma  daughter, brother        SOCIAL HISTORY:    [ x] Non-smoker    [ x] Alcohol-denies    Allergies    No Known Allergies    Intolerances    	    REVIEW OF SYSTEMS:  CONSTITUTIONAL: No fever, weight loss, or fatigue  EYES: No eye pain, visual disturbances, or discharge  ENT:  No difficulty hearing, tinnitus, vertigo; No sinus or throat pain  NECK: No pain or stiffness  RESPIRATORY: No cough, wheezing, chills or hemoptysis; No Shortness of Breath  CARDIOVASCULAR: No chest pain, palpitations, passing out, dizziness, or leg swelling  GASTROINTESTINAL: No abdominal or epigastric pain. No nausea, vomiting, or hematemesis; No diarrhea or constipation. No melena or hematochezia.  GENITOURINARY: No dysuria, frequency, hematuria, or incontinence  NEUROLOGICAL: No headaches, memory loss, loss of strength, numbness, or tremors  SKIN: No itching, burning, rashes, or lesions   LYMPH Nodes: No enlarged glands  ENDOCRINE: No heat or cold intolerance; No hair loss  MUSCULOSKELETAL: No joint pain or swelling; No muscle, back, or extremity pain  PSYCHIATRIC: No depression, anxiety, mood swings, or difficulty sleeping  HEME/LYMPH: No easy bruising, or bleeding gums  ALLERGY AND IMMUNOLOGIC: No hives or eczema	      PHYSICAL EXAM:  T(C): 36.4 (04-15-23 @ 06:00), Max: 36.8 (04-14-23 @ 21:09)  HR: 58 (04-15-23 @ 06:31) (38 - 79)  BP: 117/68 (04-15-23 @ 06:00) (115/69 - 122/68)  RR: 18 (04-15-23 @ 06:00) (16 - 18)  SpO2: 95% (04-15-23 @ 06:00) (92% - 95%)  Wt(kg): --  I&O's Summary      Appearance: Normal	  HEENT:   Normal oral mucosa, PERRL, EOMI	  Lymphatic: No lymphadenopathy  Cardiovascular: Normal S1 S2, No JVD, No murmurs, No edema  Respiratory: Lungs clear to auscultation	  Psychiatry: A & O x 3, Mood & affect appropriate  Gastrointestinal:  Soft, Non-tender, + BS	  Skin: No rashes, No ecchymoses, No cyanosis	  Neurologic: Non-focal  Extremities: Normal range of motion, No clubbing, cyanosis or edema  Vascular: Peripheral pulses palpable 2+ bilaterally    	    ECG:  	nsr with pac in 70's    LABS:	 	                      15.7   6.22  )-----------( 152      ( 14 Apr 2023 18:15 )             48.0             < from: US Duplex Venous Lower Ext Ltd, Right (04.14.23 @ 15:58) >  ACC: [id number] EXAM:  US DPLX LWR EXT VEINS LTD RT   ORDERED BY: TONI MCINTOSH     PROCEDURE DATE:  04/14/2023          INTERPRETATION:  CLINICAL INFORMATION: Right calf pain. History of recent   lumbar spinal fusion surgery. Evaluate for DVT.    COMPARISON: None available.    TECHNIQUE: Duplex sonography of the RIGHT LOWER extremity veins with   color and spectral Doppler, with and without compression.    FINDINGS:    There is normal compressibility of the right common femoral, femoral and   popliteal veins.  The contralateral common femoral vein is patent.  Doppler examination shows normal spontaneous and phasic flow.    There is patency of the posterior tibial vein and peroneal vein. The   right soleal vein is not visualized, precluding assessment.    The gastrocnemius vein within the calf appears distended with mixed   echogenicity thrombus, which is noncompressible, most consistent with   acute DVT.    IMPRESSION:  Acute DVT right gastrocnemius vein.      Results were discussed with KIKE Ellison 4/14/2023 4:12 PM by the   sonographer upon completion with read back confirmation.    --- End of Report ---            PRASHANT HERNANDEZ MD; Attending Radiologist  This document has been electronically signed. Apr 14 2023  4:13PM    < end of copied text >      
Routing to [facility].   
Patient is a 67y old  Male who presents with a chief complaint of Lumbar fusion (12 Apr 2023 08:51)      History of Present Illness:  History of Present Illness	  67 y.o male with PMHx for HLD, HTN, Bariatric surgery and Umbilical Hernia Repair in [employer] 2015,  Confirm HAMIDA- CPAP recommended - Stop using CPAP 2015 after Bariatric surgery.  s/p Fall (off Ladder) in 2018 -resulting in torn Rotator cuff, s/p Left Rotator Cuff repair 2018, and  Intervertebral Disc Displacement and Chronic Back pain. Now Presents to presurgical testing for schedule   L4-L5 Posterior Lumbar Laminectomy and Fusion with Instrumentation on 4/10/23 with Dr. Rivas        History of Present Illness: As Above. Awake, alert, comfortable in  bed in NAD      Ass Above. Awake, alert, comfortable in bed in NAD  INTERVAL HPI/OVERNIGHT EVENTS:  T(C): 37.2 (04-12-23 @ 05:16), Max: 37.2 (04-12-23 @ 05:16)  HR: 57 (04-12-23 @ 05:16) (57 - 75)  BP: 117/69 (04-12-23 @ 05:16) (114/64 - 120/70)  RR: 18 (04-12-23 @ 05:16) (18 - 18)  SpO2: 95% (04-12-23 @ 05:16) (94% - 96%)  Wt(kg): --  I&O's Summary      PAST MEDICAL & SURGICAL HISTORY:  HLD (hyperlipidemia)      Obstructive sleep apnea on C PAP  no longer using CPAP machine      Migraine headache      Bilateral otitis media      HTN (hypertension)      DVT (deep venous thrombosis)  LLE in 2019 treated with eliquis for 6 months      HLD (hyperlipidemia)      Vertigo      Chronic mastoiditis  right ear      Unspecified cholesteatoma, right ear      Otalgia, right ear      HTN (hypertension)      Poor historian      Renal cyst, left      Prediabetes  pt states A1c is 6. denies current use of meds      Lumbar herniated disc      History of umbilical hernia repair  x2      History of weight loss surgery  5 years ago in [place]      S/P shoulder surgery  left shoulder      S/P UPPP (uvulopalatopharyngoplasty)  >5 years ago      H/O prostate biopsy          SOCIAL HISTORY  Alcohol:  Tobacco:  Illicit substance use:      FAMILY HISTORY:      LABS:              CAPILLARY BLOOD GLUCOSE                MEDICATIONS  (STANDING):  acetaminophen     Tablet .. 1000 milliGRAM(s) Oral every 8 hours  gabapentin 300 milliGRAM(s) Oral every 8 hours  magnesium hydroxide Suspension 30 milliLiter(s) Oral daily  methocarbamol 750 milliGRAM(s) Oral every 8 hours  pantoprazole  Injectable 40 milliGRAM(s) IV Push daily  polyethylene glycol 3350 17 Gram(s) Oral daily  senna 2 Tablet(s) Oral at bedtime    MEDICATIONS  (PRN):  ondansetron Injectable 4 milliGRAM(s) IV Push every 6 hours PRN Nausea and/or Vomiting  traMADol 50 milliGRAM(s) Oral every 4 hours PRN Severe Pain (7 - 10)      REVIEW OF SYSTEMS:  CONSTITUTIONAL: No fever, weight loss, or fatigue  EYES: No eye pain, visual disturbances, or discharge  ENMT:  No difficulty hearing, tinnitus, vertigo; No sinus or throat pain  NECK: No pain or stiffness  RESPIRATORY: No cough, wheezing, chills or hemoptysis; No shortness of breath  CARDIOVASCULAR: No chest pain, palpitations, dizziness, or leg swelling  GASTROINTESTINAL: No abdominal or epigastric pain. No nausea, vomiting, or hematemesis; No diarrhea or constipation. No melena or hematochezia.  GENITOURINARY: No dysuria, frequency, hematuria, or incontinence  NEUROLOGICAL: No headaches, memory loss, loss of strength, numbness, or tremors  SKIN: No itching, burning, rashes, or lesions   LYMPH NODES: No enlarged glands  ENDOCRINE: No heat or cold intolerance; No hair loss  MUSCULOSKELETAL: No joint pain or swelling; No muscle, back, or extremity pain  PSYCHIATRIC: No depression, anxiety, mood swings, or difficulty sleeping  HEME/LYMPH: No easy bruising, or bleeding gums  ALLERY AND IMMUNOLOGIC: No hives or eczema    PHYSICAL EXAM:  GENERAL: NAD, well-groomed, well-developed  HEAD:  Atraumatic, Normocephalic  EYES: EOMI, PERRLA, conjunctiva and sclera clear  ENMT: No tonsillar erythema, exudates, or enlargement; Moist mucous membranes, Good dentition, No lesions  NECK: Supple, No JVD, Normal thyroid  NERVOUS SYSTEM:  Alert & Oriented X3, Good concentration; Motor Strength 5/5 B/L upper and lower extremities; DTRs 2+ intact and symmetric  CHEST/LUNG: Clear to percussion bilaterally; No rales, rhonchi, wheezing, or rubs  HEART: Regular rate and rhythm; No murmurs, rubs, or gallops  ABDOMEN: Soft, Nontender, Nondistended; Bowel sounds present  EXTREMITIES:  2+ Peripheral Pulses, No clubbing, cyanosis, or edema  LYMPH: No lymphadenopathy noted  SKIN: No rashes or lesions    RADIOLOGY & ADDITIONAL TESTS:    Imaging Personally Reviewed:  [x] YES  [ ] NO    Consultant(s) Notes Reviewed:  [ x] YES  [ ] NO        Care Discussed with Consultants/Other Providers [x ] YES  [ ] NO
  Source of information: ABEBA SCHULTZ, Chart review  Patient language: Bahamian  : [place] # [id number]    HPI:      Patient is a 67y old  Male with PMH HTN HLD obesity, HAMIDA, HTN lumbar disc herniation, lumbar stenosis, lumbar radiculopathy who presents for a scheduled lumbar laminectomy. Pt s/p L4-L5 laminectomy and fusion with posterior instrumentation on 4/10, POD #1. Pain consulted for postop back pain. Pt seen and examined at bedside. Pt laying in bed, reports lumbar back pain greatest over left lumbar back, reports pain score 10/10 and not tolerable SCALE USED: (1-10 VNRS). Pt describes pain as constant, throbbing, sharp, radiating to right hip, not alleviated by current pain medication oxycodone, exacerbated by movement. Pt tolerating PO diet. Denies lethargy, chest pain, SOB,, vomiting, constipation. Reports nausea, and heartburn RN to administer zofran PRN. Patient stated goal for pain control: to be able to take deep breaths, get out of bed to chair and ambulate with tolerable pain control. Pt denies taking medications for pain at home.     PAST MEDICAL & SURGICAL HISTORY:  HLD (hyperlipidemia)      Obstructive sleep apnea on C PAP  no longer using CPAP machine      Migraine headache      Bilateral otitis media      HTN (hypertension)      DVT (deep venous thrombosis)  LLE in 2019 treated with eliquis for 6 months      HLD (hyperlipidemia)      Vertigo      Chronic mastoiditis  right ear      Unspecified cholesteatoma, right ear      Otalgia, right ear      HTN (hypertension)      Poor historian      Renal cyst, left      Prediabetes  pt states A1c is 6. denies current use of meds      Lumbar herniated disc      History of umbilical hernia repair  x2      History of weight loss surgery  5 years ago in [place]      S/P shoulder surgery  left shoulder      S/P UPPP (uvulopalatopharyngoplasty)  >5 years ago      H/O prostate biopsy          FAMILY HISTORY:  FH: asthma  daughter, brother        Social History:   [X ] Denies ETOH use, illicit drug use and smoking    Allergies    No Known Allergies    MEDICATIONS  (STANDING):  acetaminophen     Tablet .. 1000 milliGRAM(s) Oral every 8 hours  gabapentin 100 milliGRAM(s) Oral three times a day  methocarbamol 750 milliGRAM(s) Oral every 8 hours  pantoprazole  Injectable 40 milliGRAM(s) IV Push daily  polyethylene glycol 3350 17 Gram(s) Oral daily  senna 2 Tablet(s) Oral at bedtime    MEDICATIONS  (PRN):  HYDROmorphone   Tablet 4 milliGRAM(s) Oral every 4 hours PRN Severe Pain (7 - 10)  ondansetron Injectable 4 milliGRAM(s) IV Push every 6 hours PRN Nausea and/or Vomiting      Vital Signs Last 24 Hrs  T(C): 36.7 (11 Apr 2023 13:13), Max: 37.1 (11 Apr 2023 00:25)  T(F): 98 (11 Apr 2023 13:13), Max: 98.7 (11 Apr 2023 00:25)  HR: 61 (11 Apr 2023 13:13) (58 - 81)  BP: 116/66 (11 Apr 2023 13:13) (114/64 - 151/89)  BP(mean): 90 (10 Apr 2023 21:14) (84 - 90)  RR: 18 (11 Apr 2023 13:13) (14 - 20)  SpO2: 94% (11 Apr 2023 13:13) (94% - 99%)    Parameters below as of 11 Apr 2023 13:13  Patient On (Oxygen Delivery Method): room air        LABS: Reviewed.                  CAPILLARY BLOOD GLUCOSE      POCT Blood Glucose.: 112 mg/dL (10 Apr 2023 19:31)    COVID-19 PCR: NotDetec (10 Apr 2023 10:10)      Radiology: Reviewed.   < from: CT Head No Cont (01.29.20 @ 14:43) >  EXAM:  CT BRAIN                            PROCEDURE DATE:  01/29/2020          INTERPRETATION:  .    CLINICAL INFORMATION: headache/ dizziness    TECHNIQUE: Multiple axial CT images of the head were obtained without contrast. Sagittal and coronal reconstructed images were acquired from the source data.    COMPARISON: No prior CT studies of the brain are available for comparison at this institution.    FINDINGS: There is no acute intracranial hemorrhage, mass effect, shift of the midline structures, herniation, extra-axial fluid collection, or hydrocephalus.    The paranasal sinuses are clear. There is complete opacification of the right-sided mastoid air cells along with opacification of the middle ear cavity. There is decreased pneumatization of the bilateral mastoid tips with associated sclerosis. The orbits are unremarkable. The calvarium is intact.    IMPRESSION: No acute intracranial hemorrhage, mass effect, or shift of the midline structures.    Findings which may reflect chronicright-sided mastoiditis. Clinical correlation is required.                ELENA AMEZQUITA M.D., ATTENDING RADIOLOGIST  This document has been electronically signed. Jan 29 2020  2:51PM              < end of copied text >      ORT Score -   Family Hx of substance abuse	Female	      Male  Alcohol 	                                           1                     3  Illegal drugs	                                   2                     3  Rx drugs                                           4 	                  4  Personal Hx of substance abuse		  Alcohol 	                                          3	                  3  Illegal drugs                                     4	                  4  Rx drugs                                            5 	                  5  Age between 16- 45 years	           1                     1  hx preadolescent sexual abuse	   3 	                  0  Psychological disease		  ADD, OCD, bipolar, schizophrenia   2	          2  Depression                                           1 	          1  Total: 0    a score of 3 or lower indicates low risk for opioid abuse		  a score of 4-7 indicates moderate risk for opioid abuse		  a score of 8 or higher indicates high risk for opioid abuse    REVIEW OF SYSTEMS:  CONSTITUTIONAL: No fever or fatigue  HEENT:  No difficulty hearing, no change in vision  NECK: No pain or stiffness  RESPIRATORY: No cough, wheezing, chills or hemoptysis; No shortness of breath  CARDIOVASCULAR: No chest pain, palpitations, dizziness, or leg swelling  GASTROINTESTINAL: No loss of appetite, decreased PO intake. No abdominal or epigastric pain. + nausea no vomiting; No diarrhea or constipation. +GERD, + heartburn   GENITOURINARY: No dysuria, frequency, hematuria, retention or incontinence  MUSCULOSKELETAL: + left lumbar back pain; No joint swelling; no upper or lower motor strength weakness, no saddle anesthesia, bowel/bladder incontinence, no falls   NEURO: No headaches, No numbness/tingling b/l LE, No weakness    PHYSICAL EXAM:  GENERAL:  Alert & Oriented X4, cooperative, NAD, Good concentration. Speech is clear.   RESPIRATORY: Respirations even and unlabored. Clear to auscultation bilaterally; No rales, rhonchi, wheezing, or rubs  CARDIOVASCULAR: Normal S1/S2, regular rate and rhythm; No murmurs, rubs, or gallops. No JVD.   GASTROINTESTINAL: + obese per BMI, Soft, Nontender, Nondistended; Bowel sounds present  PERIPHERAL VASCULAR:  Extremities warm without edema. 2+ Peripheral Pulses, No cyanosis, No calf tenderness  MUSCULOSKELETAL: Motor Strength 5/5 B/L upper and 4/5 lower extremities;+ decreased ROM b/l LE due to back pain; + right lumbar back tenderness on palpation.   SKIN: Warm, dry, intact. No rashes, lesions, scars. +lumbar back surgical gauze dressing x 2 c/d/i    Risk factors associated with adverse outcomes related to opioid treatment  [ ]  Concurrent benzodiazepine use  [ ]  History/ Active substance use or alcohol use disorder  [ ] Psychiatric co-morbidity  [X] Sleep apnea  [ ] COPD  [X ] BMI> 35  [ ] Liver dysfunction  [ ] Renal dysfunction  [ ] CHF  [ ] Smoker  [X ]  Age > 60 years    [X ]  [place]  Reviewed and Copied to Chart. See below.    Plan of care and goal oriented pain management treatment options were discussed with patient and /or primary care giver; all questions and concerns were addressed and care was aligned with patient's wishes.    Educated patient on goal oriented pain management treatment options

## 2023-12-18 ENCOUNTER — NON-APPOINTMENT (OUTPATIENT)
Age: 68
End: 2023-12-18

## 2023-12-22 ENCOUNTER — INPATIENT (INPATIENT)
Facility: HOSPITAL | Age: 68
LOS: 13 days | Discharge: HOME | End: 2024-01-05
Attending: HOSPITALIST | Admitting: HOSPITALIST
Payer: COMMERCIAL

## 2023-12-22 VITALS
SYSTOLIC BLOOD PRESSURE: 145 MMHG | OXYGEN SATURATION: 94 % | TEMPERATURE: 98 F | HEART RATE: 81 BPM | DIASTOLIC BLOOD PRESSURE: 73 MMHG | RESPIRATION RATE: 18 BRPM

## 2023-12-22 DIAGNOSIS — Z98.890 OTHER SPECIFIED POSTPROCEDURAL STATES: Chronic | ICD-10-CM

## 2023-12-22 DIAGNOSIS — S70.11XA CONTUSION OF RIGHT THIGH, INITIAL ENCOUNTER: ICD-10-CM

## 2023-12-22 DIAGNOSIS — Z98.890 OTHER SPECIFIED POSTPROCEDURAL STATES: ICD-10-CM

## 2023-12-22 DIAGNOSIS — E78.5 HYPERLIPIDEMIA, UNSPECIFIED: ICD-10-CM

## 2023-12-22 DIAGNOSIS — I10 ESSENTIAL (PRIMARY) HYPERTENSION: ICD-10-CM

## 2023-12-22 DIAGNOSIS — Z29.9 ENCOUNTER FOR PROPHYLACTIC MEASURES, UNSPECIFIED: ICD-10-CM

## 2023-12-22 DIAGNOSIS — T14.8XXA OTHER INJURY OF UNSPECIFIED BODY REGION, INITIAL ENCOUNTER: ICD-10-CM

## 2023-12-22 DIAGNOSIS — Z98.84 BARIATRIC SURGERY STATUS: Chronic | ICD-10-CM

## 2023-12-22 DIAGNOSIS — M25.551 PAIN IN RIGHT HIP: ICD-10-CM

## 2023-12-22 LAB
ALBUMIN SERPL ELPH-MCNC: 4.2 G/DL — SIGNIFICANT CHANGE UP (ref 3.3–5)
ALBUMIN SERPL ELPH-MCNC: 4.2 G/DL — SIGNIFICANT CHANGE UP (ref 3.3–5)
ALP SERPL-CCNC: 92 U/L — SIGNIFICANT CHANGE UP (ref 40–120)
ALP SERPL-CCNC: 92 U/L — SIGNIFICANT CHANGE UP (ref 40–120)
ALT FLD-CCNC: 29 U/L — SIGNIFICANT CHANGE UP (ref 4–41)
ALT FLD-CCNC: 29 U/L — SIGNIFICANT CHANGE UP (ref 4–41)
ANION GAP SERPL CALC-SCNC: 12 MMOL/L — SIGNIFICANT CHANGE UP (ref 7–14)
ANION GAP SERPL CALC-SCNC: 12 MMOL/L — SIGNIFICANT CHANGE UP (ref 7–14)
APTT BLD: 28.7 SEC — SIGNIFICANT CHANGE UP (ref 24.5–35.6)
APTT BLD: 28.7 SEC — SIGNIFICANT CHANGE UP (ref 24.5–35.6)
AST SERPL-CCNC: 21 U/L — SIGNIFICANT CHANGE UP (ref 4–40)
AST SERPL-CCNC: 21 U/L — SIGNIFICANT CHANGE UP (ref 4–40)
BASOPHILS # BLD AUTO: 0.02 K/UL — SIGNIFICANT CHANGE UP (ref 0–0.2)
BASOPHILS # BLD AUTO: 0.02 K/UL — SIGNIFICANT CHANGE UP (ref 0–0.2)
BASOPHILS NFR BLD AUTO: 0.3 % — SIGNIFICANT CHANGE UP (ref 0–2)
BASOPHILS NFR BLD AUTO: 0.3 % — SIGNIFICANT CHANGE UP (ref 0–2)
BILIRUB SERPL-MCNC: 0.4 MG/DL — SIGNIFICANT CHANGE UP (ref 0.2–1.2)
BILIRUB SERPL-MCNC: 0.4 MG/DL — SIGNIFICANT CHANGE UP (ref 0.2–1.2)
BUN SERPL-MCNC: 13 MG/DL — SIGNIFICANT CHANGE UP (ref 7–23)
BUN SERPL-MCNC: 13 MG/DL — SIGNIFICANT CHANGE UP (ref 7–23)
CALCIUM SERPL-MCNC: 8.5 MG/DL — SIGNIFICANT CHANGE UP (ref 8.4–10.5)
CALCIUM SERPL-MCNC: 8.5 MG/DL — SIGNIFICANT CHANGE UP (ref 8.4–10.5)
CHLORIDE SERPL-SCNC: 103 MMOL/L — SIGNIFICANT CHANGE UP (ref 98–107)
CHLORIDE SERPL-SCNC: 103 MMOL/L — SIGNIFICANT CHANGE UP (ref 98–107)
CO2 SERPL-SCNC: 24 MMOL/L — SIGNIFICANT CHANGE UP (ref 22–31)
CO2 SERPL-SCNC: 24 MMOL/L — SIGNIFICANT CHANGE UP (ref 22–31)
CREAT SERPL-MCNC: 0.8 MG/DL — SIGNIFICANT CHANGE UP (ref 0.5–1.3)
CREAT SERPL-MCNC: 0.8 MG/DL — SIGNIFICANT CHANGE UP (ref 0.5–1.3)
EGFR: 95 ML/MIN/1.73M2 — SIGNIFICANT CHANGE UP
EGFR: 95 ML/MIN/1.73M2 — SIGNIFICANT CHANGE UP
EOSINOPHIL # BLD AUTO: 0.1 K/UL — SIGNIFICANT CHANGE UP (ref 0–0.5)
EOSINOPHIL # BLD AUTO: 0.1 K/UL — SIGNIFICANT CHANGE UP (ref 0–0.5)
EOSINOPHIL NFR BLD AUTO: 1.6 % — SIGNIFICANT CHANGE UP (ref 0–6)
EOSINOPHIL NFR BLD AUTO: 1.6 % — SIGNIFICANT CHANGE UP (ref 0–6)
GLUCOSE SERPL-MCNC: 145 MG/DL — HIGH (ref 70–99)
GLUCOSE SERPL-MCNC: 145 MG/DL — HIGH (ref 70–99)
HCT VFR BLD CALC: 42.9 % — SIGNIFICANT CHANGE UP (ref 39–50)
HCT VFR BLD CALC: 42.9 % — SIGNIFICANT CHANGE UP (ref 39–50)
HGB BLD-MCNC: 14.7 G/DL — SIGNIFICANT CHANGE UP (ref 13–17)
HGB BLD-MCNC: 14.7 G/DL — SIGNIFICANT CHANGE UP (ref 13–17)
IANC: 4.81 K/UL — SIGNIFICANT CHANGE UP (ref 1.8–7.4)
IANC: 4.81 K/UL — SIGNIFICANT CHANGE UP (ref 1.8–7.4)
IMM GRANULOCYTES NFR BLD AUTO: 0.3 % — SIGNIFICANT CHANGE UP (ref 0–0.9)
IMM GRANULOCYTES NFR BLD AUTO: 0.3 % — SIGNIFICANT CHANGE UP (ref 0–0.9)
INR BLD: 0.97 RATIO — SIGNIFICANT CHANGE UP (ref 0.85–1.18)
INR BLD: 0.97 RATIO — SIGNIFICANT CHANGE UP (ref 0.85–1.18)
LYMPHOCYTES # BLD AUTO: 1.04 K/UL — SIGNIFICANT CHANGE UP (ref 1–3.3)
LYMPHOCYTES # BLD AUTO: 1.04 K/UL — SIGNIFICANT CHANGE UP (ref 1–3.3)
LYMPHOCYTES # BLD AUTO: 16.2 % — SIGNIFICANT CHANGE UP (ref 13–44)
LYMPHOCYTES # BLD AUTO: 16.2 % — SIGNIFICANT CHANGE UP (ref 13–44)
MCHC RBC-ENTMCNC: 30.5 PG — SIGNIFICANT CHANGE UP (ref 27–34)
MCHC RBC-ENTMCNC: 30.5 PG — SIGNIFICANT CHANGE UP (ref 27–34)
MCHC RBC-ENTMCNC: 34.3 GM/DL — SIGNIFICANT CHANGE UP (ref 32–36)
MCHC RBC-ENTMCNC: 34.3 GM/DL — SIGNIFICANT CHANGE UP (ref 32–36)
MCV RBC AUTO: 89 FL — SIGNIFICANT CHANGE UP (ref 80–100)
MCV RBC AUTO: 89 FL — SIGNIFICANT CHANGE UP (ref 80–100)
MONOCYTES # BLD AUTO: 0.43 K/UL — SIGNIFICANT CHANGE UP (ref 0–0.9)
MONOCYTES # BLD AUTO: 0.43 K/UL — SIGNIFICANT CHANGE UP (ref 0–0.9)
MONOCYTES NFR BLD AUTO: 6.7 % — SIGNIFICANT CHANGE UP (ref 2–14)
MONOCYTES NFR BLD AUTO: 6.7 % — SIGNIFICANT CHANGE UP (ref 2–14)
NEUTROPHILS # BLD AUTO: 4.81 K/UL — SIGNIFICANT CHANGE UP (ref 1.8–7.4)
NEUTROPHILS # BLD AUTO: 4.81 K/UL — SIGNIFICANT CHANGE UP (ref 1.8–7.4)
NEUTROPHILS NFR BLD AUTO: 74.9 % — SIGNIFICANT CHANGE UP (ref 43–77)
NEUTROPHILS NFR BLD AUTO: 74.9 % — SIGNIFICANT CHANGE UP (ref 43–77)
NRBC # BLD: 0 /100 WBCS — SIGNIFICANT CHANGE UP (ref 0–0)
NRBC # BLD: 0 /100 WBCS — SIGNIFICANT CHANGE UP (ref 0–0)
NRBC # FLD: 0 K/UL — SIGNIFICANT CHANGE UP (ref 0–0)
NRBC # FLD: 0 K/UL — SIGNIFICANT CHANGE UP (ref 0–0)
PLATELET # BLD AUTO: 174 K/UL — SIGNIFICANT CHANGE UP (ref 150–400)
PLATELET # BLD AUTO: 174 K/UL — SIGNIFICANT CHANGE UP (ref 150–400)
POTASSIUM SERPL-MCNC: 4.1 MMOL/L — SIGNIFICANT CHANGE UP (ref 3.5–5.3)
POTASSIUM SERPL-MCNC: 4.1 MMOL/L — SIGNIFICANT CHANGE UP (ref 3.5–5.3)
POTASSIUM SERPL-SCNC: 4.1 MMOL/L — SIGNIFICANT CHANGE UP (ref 3.5–5.3)
POTASSIUM SERPL-SCNC: 4.1 MMOL/L — SIGNIFICANT CHANGE UP (ref 3.5–5.3)
PROT SERPL-MCNC: 6 G/DL — SIGNIFICANT CHANGE UP (ref 6–8.3)
PROT SERPL-MCNC: 6 G/DL — SIGNIFICANT CHANGE UP (ref 6–8.3)
PROTHROM AB SERPL-ACNC: 10.9 SEC — SIGNIFICANT CHANGE UP (ref 9.5–13)
PROTHROM AB SERPL-ACNC: 10.9 SEC — SIGNIFICANT CHANGE UP (ref 9.5–13)
RBC # BLD: 4.82 M/UL — SIGNIFICANT CHANGE UP (ref 4.2–5.8)
RBC # BLD: 4.82 M/UL — SIGNIFICANT CHANGE UP (ref 4.2–5.8)
RBC # FLD: 13 % — SIGNIFICANT CHANGE UP (ref 10.3–14.5)
RBC # FLD: 13 % — SIGNIFICANT CHANGE UP (ref 10.3–14.5)
SODIUM SERPL-SCNC: 139 MMOL/L — SIGNIFICANT CHANGE UP (ref 135–145)
SODIUM SERPL-SCNC: 139 MMOL/L — SIGNIFICANT CHANGE UP (ref 135–145)
WBC # BLD: 6.42 K/UL — SIGNIFICANT CHANGE UP (ref 3.8–10.5)
WBC # BLD: 6.42 K/UL — SIGNIFICANT CHANGE UP (ref 3.8–10.5)
WBC # FLD AUTO: 6.42 K/UL — SIGNIFICANT CHANGE UP (ref 3.8–10.5)
WBC # FLD AUTO: 6.42 K/UL — SIGNIFICANT CHANGE UP (ref 3.8–10.5)

## 2023-12-22 PROCEDURE — 99223 1ST HOSP IP/OBS HIGH 75: CPT

## 2023-12-22 PROCEDURE — 99222 1ST HOSP IP/OBS MODERATE 55: CPT | Mod: GC

## 2023-12-22 PROCEDURE — 73552 X-RAY EXAM OF FEMUR 2/>: CPT | Mod: 26,RT

## 2023-12-22 PROCEDURE — 72131 CT LUMBAR SPINE W/O DYE: CPT | Mod: 26,MA

## 2023-12-22 PROCEDURE — 73700 CT LOWER EXTREMITY W/O DYE: CPT | Mod: 26,RT,MA

## 2023-12-22 PROCEDURE — 73502 X-RAY EXAM HIP UNI 2-3 VIEWS: CPT | Mod: 26,RT

## 2023-12-22 PROCEDURE — 99285 EMERGENCY DEPT VISIT HI MDM: CPT

## 2023-12-22 RX ORDER — OXYCODONE HYDROCHLORIDE 5 MG/1
5 TABLET ORAL EVERY 6 HOURS
Refills: 0 | Status: DISCONTINUED | OUTPATIENT
Start: 2023-12-22 | End: 2023-12-28

## 2023-12-22 RX ORDER — MORPHINE SULFATE 50 MG/1
4 CAPSULE, EXTENDED RELEASE ORAL ONCE
Refills: 0 | Status: DISCONTINUED | OUTPATIENT
Start: 2023-12-22 | End: 2023-12-22

## 2023-12-22 RX ORDER — ATORVASTATIN CALCIUM 80 MG/1
1 TABLET, FILM COATED ORAL
Refills: 0 | DISCHARGE

## 2023-12-22 RX ORDER — ACETAMINOPHEN 500 MG
650 TABLET ORAL EVERY 6 HOURS
Refills: 0 | Status: DISCONTINUED | OUTPATIENT
Start: 2023-12-22 | End: 2023-12-23

## 2023-12-22 RX ORDER — HYDROMORPHONE HYDROCHLORIDE 2 MG/ML
0.5 INJECTION INTRAMUSCULAR; INTRAVENOUS; SUBCUTANEOUS EVERY 6 HOURS
Refills: 0 | Status: DISCONTINUED | OUTPATIENT
Start: 2023-12-22 | End: 2023-12-27

## 2023-12-22 RX ADMIN — HYDROMORPHONE HYDROCHLORIDE 0.5 MILLIGRAM(S): 2 INJECTION INTRAMUSCULAR; INTRAVENOUS; SUBCUTANEOUS at 23:43

## 2023-12-22 RX ADMIN — MORPHINE SULFATE 4 MILLIGRAM(S): 50 CAPSULE, EXTENDED RELEASE ORAL at 19:14

## 2023-12-22 RX ADMIN — HYDROMORPHONE HYDROCHLORIDE 0.5 MILLIGRAM(S): 2 INJECTION INTRAMUSCULAR; INTRAVENOUS; SUBCUTANEOUS at 23:12

## 2023-12-22 RX ADMIN — MORPHINE SULFATE 4 MILLIGRAM(S): 50 CAPSULE, EXTENDED RELEASE ORAL at 13:09

## 2023-12-22 RX ADMIN — MORPHINE SULFATE 4 MILLIGRAM(S): 50 CAPSULE, EXTENDED RELEASE ORAL at 12:13

## 2023-12-22 NOTE — CONSULT NOTE ADULT - ATTENDING COMMENTS
I have reviewed the history, pertinent labs and imaging, and discussed the care with the consult resident.  More than 50% of this 55 minute encounter including face to face with the patient was spent counseling and/or coordination of care on Thigh hematoma.  Time included review of vitals, labs, imaging, discussion with consultants and coordination with the operating room/emergency department.    69yo M s/p fall presenting with Leg pain and swelling. Pt motor and sensory intact. Swelling of thigh. No signs of compartment syndrome.     - Trend CK, lactate, and CBC  - Serial exams of LE     The active issues are:  1. LE hematoma     The Acute Care Surgery (B Team) Attending Group Practice:  Dr. Bev Longo    urgent issues - spectra [id number]  nonurgent issues - [phone]  patient appointments or afterhours - [phone] I have reviewed the history, pertinent labs and imaging, and discussed the care with the consult resident.  More than 50% of this 55 minute encounter including face to face with the patient was spent counseling and/or coordination of care on Thigh hematoma.  Time included review of vitals, labs, imaging, discussion with consultants and coordination with the operating room/emergency department.    69yo M s/p fall presenting with Leg pain and swelling. Pt motor and sensory intact. Swelling of thigh. No signs of compartment syndrome.     - Trend CK, lactate, and CBC  - Serial exams of LE     The active issues are:  1. LE hematoma     The Acute Care Surgery (B Team) Attending Group Practice:  Dr. Bev Longo    urgent issues - spectra [id number]  nonurgent issues - [phone]  patient appointments or afterhours - [phone]

## 2023-12-22 NOTE — H&P ADULT - HISTORY OF PRESENT ILLNESS
69yo male with pmh HTN, HLD, prev DVT, history of lumbar laminectomy in April 2023, bariatric surgery 15 years ago presenting after a fall at work after not noticing oil on the floor, fell on his right side. Patient denied any dizziness or syncope prior to fall, denied any trauma to head or loss of consciousness. Patient is complaining of lower back pain and pain of the upper RLE, rates as 10/10, pressure sensation. Patient has not had any surgeries of RLE in the past. No numbness or tingling of the leg, however patient cannot ambulate on RLE. Denies fevers, chills, weakness, chest pain, sob, n/v, abdominal pain, diarrhea, dysuria.  67yo male with pmh HTN, HLD, prev DVT, history of lumbar laminectomy in April 2023, bariatric surgery 15 years ago presenting after a fall at work after not noticing oil on the floor, fell on his right side. Patient denied any dizziness or syncope prior to fall, denied any trauma to head or loss of consciousness. Patient is complaining of lower back pain and pain of the upper RLE, rates as 10/10, pressure sensation. Patient has not had any surgeries of RLE in the past. No numbness or tingling of the leg, however patient cannot ambulate on RLE. Denies fevers, chills, weakness, chest pain, sob, n/v, abdominal pain, diarrhea, dysuria. Admitted for management of fall.  67yo male with pmh HTN, HLD, prev DVT, history of lumbar laminectomy in April 2023, bariatric surgery 15 years ago, BPH presenting after a fall at work after not noticing oil on the floor, fell on his right side. Patient denied any dizziness or syncope prior to fall, denied any trauma to head or loss of consciousness. Patient is complaining of lower back pain and pain of the upper RLE, rates as 10/10, pressure sensation. Patient has not had any surgeries of RLE in the past. No numbness or tingling of the leg, however patient cannot ambulate on RLE. Denies fevers, chills, weakness, chest pain, sob, n/v, abdominal pain, diarrhea, dysuria. Admitted for management of fall.  69yo male with pmh HTN, HLD, prev DVT, history of lumbar laminectomy in April 2023, bariatric surgery 15 years ago, BPH presenting after a fall at work after not noticing oil on the floor, fell on his right side. Patient denied any dizziness or syncope prior to fall, denied any trauma to head or loss of consciousness. Patient is complaining of lower back pain and pain of the upper RLE, rates as 10/10, pressure sensation. Patient has not had any surgeries of RLE in the past. No numbness or tingling of the leg, however patient cannot ambulate on RLE. Denies fevers, chills, weakness, chest pain, sob, n/v, abdominal pain, diarrhea, dysuria. Admitted for management of fall.

## 2023-12-22 NOTE — H&P ADULT - PROBLEM SELECTOR PLAN 4
- hx of lumbar laminectomy, L4/L5 fusion in April 2023   - uses tramadol at home as needed  - pain control

## 2023-12-22 NOTE — CONSULT NOTE ADULT - SUBJECTIVE AND OBJECTIVE BOX
68 year old male with PMH of DVT on eliquis presents to [facility] ED on 12/22 s/p fall on ramp. Patient states that he landed on his R side and was subsequently unable to ambulate on his right lower extremity due to diffuse pain. His right thigh has been swollen since time or injury. Orthopedics consulted to rule out compartment syndrome without other orthopedic injury.    SUBJECTIVE  Patient reports pain upon palpation of right thigh. Denies numbness/tingling in his right lower extremity.    OBJECTIVE  Vital Signs Last 24 Hrs  T(C): 36.4 (22 Dec 2023 10:32), Max: 36.4 (22 Dec 2023 10:32)  T(F): 97.5 (22 Dec 2023 10:32), Max: 97.5 (22 Dec 2023 10:32)  HR: 81 (22 Dec 2023 10:32) (81 - 81)  BP: 145/73 (22 Dec 2023 10:32) (145/73 - 145/73)  BP(mean): --  RR: 18 (22 Dec 2023 10:32) (18 - 18)  SpO2: 94% (22 Dec 2023 10:32) (94% - 94%)        PHYSICAL EXAM  Gen: Lying in bed, NAD  Resp: No increased WOB  RLE:  Compartments full but compressible.  No pain with passive stretch.  Motor: PF/DF/EHL/FHL  Sensory: SILT  +DP pulse, WWP    XR: negative for fracture 68 year old male with PMH of DVT on eliquis presents to [facility] ED on 12/22 s/p fall on ramp. Patient states that he landed on his R side and was subsequently unable to ambulate on his right lower extremity due to diffuse pain. His right thigh has been swollen since time or injury. Orthopedics consulted to rule out compartment syndrome without other orthopedic injury.    SUBJECTIVE  Patient reports pain upon palpation of right thigh. Denies numbness/tingling in his right lower extremity.    OBJECTIVE  Vital Signs Last 24 Hrs  T(C): 36.4 (22 Dec 2023 10:32), Max: 36.4 (22 Dec 2023 10:32)  T(F): 97.5 (22 Dec 2023 10:32), Max: 97.5 (22 Dec 2023 10:32)  HR: 81 (22 Dec 2023 10:32) (81 - 81)  BP: 145/73 (22 Dec 2023 10:32) (145/73 - 145/73)  BP(mean): --  RR: 18 (22 Dec 2023 10:32) (18 - 18)  SpO2: 94% (22 Dec 2023 10:32) (94% - 94%)        PHYSICAL EXAM  Gen: Lying in bed, NAD  Resp: No increased WOB  RLE:  Compartments full but compressible.  No pain with passive stretch.  Motor: PF/DF/EHL/FHL  Sensory: SILT  +DP pulse, WWP    XR: negative for fracture 68 year old male with PMH of DVT on eliquis presents to [facility] ED on 12/22 s/p fall on ramp. Patient states that he landed on his R side and was subsequently unable to ambulate on his right lower extremity due to diffuse pain. His right thigh has been swollen since time or injury. Has been having difficulty bearing weight and ambulating since fall.  Orthopedics consulted to rule out compartment syndrome without other orthopedic injury.    SUBJECTIVE  Patient reports pain upon palpation of right thigh. Denies numbness/tingling in his right lower extremity.    OBJECTIVE  Vital Signs Last 24 Hrs  T(C): 36.4 (22 Dec 2023 10:32), Max: 36.4 (22 Dec 2023 10:32)  T(F): 97.5 (22 Dec 2023 10:32), Max: 97.5 (22 Dec 2023 10:32)  HR: 81 (22 Dec 2023 10:32) (81 - 81)  BP: 145/73 (22 Dec 2023 10:32) (145/73 - 145/73)  BP(mean): --  RR: 18 (22 Dec 2023 10:32) (18 - 18)  SpO2: 94% (22 Dec 2023 10:32) (94% - 94%)        PHYSICAL EXAM  Gen: Lying in bed, NAD  Resp: No increased WOB  RLE:  Mild swelling appreciated over R thigh  Right thigh is slightly tense on lateral aspect but otherwise compressible  No pain with passive stretch.  Motor: PF/DF/EHL/FHL (pt able to flex hip but says it causes pain in lower back and lateral thigh)  Sensory: SILT throughout LE   +DP pulse, WWP    Imaging  XR R hip: negative for fracture or dislocation  CT Hip with large thigh hematoma  68 year old male with PMH of DVT on eliquis presents to [facility] ED on 12/22 s/p fall on ramp. Patient states that he landed on his R side and was subsequently unable to ambulate on his right lower extremity due to diffuse pain. His right thigh has been swollen since time or injury. Has been having difficulty bearing weight and ambulating since fall.  Orthopedics consulted to rule out compartment syndrome without other orthopedic injury.    SUBJECTIVE  Patient reports pain upon palpation of right thigh. Denies numbness/tingling in his right lower extremity.    OBJECTIVE  Vital Signs Last 24 Hrs  T(C): 36.4 (22 Dec 2023 10:32), Max: 36.4 (22 Dec 2023 10:32)  T(F): 97.5 (22 Dec 2023 10:32), Max: 97.5 (22 Dec 2023 10:32)  HR: 81 (22 Dec 2023 10:32) (81 - 81)  BP: 145/73 (22 Dec 2023 10:32) (145/73 - 145/73)  BP(mean): --  RR: 18 (22 Dec 2023 10:32) (18 - 18)  SpO2: 94% (22 Dec 2023 10:32) (94% - 94%)        PHYSICAL EXAM  Gen: Lying in bed, NAD  Resp: No increased WOB  RLE:  Mild swelling appreciated over R thigh  Right thigh is slightly tense on lateral aspect but otherwise compressible  No pain with passive stretch.  Motor: PF/DF/EHL/FHL (pt able to flex hip but says it causes pain in lower back and lateral thigh)  Sensory: SILT throughout LE   +DP pulse, WWP    Imaging  XR R hip: negative for fracture or dislocation  CT Hip with large thigh hematoma

## 2023-12-22 NOTE — CONSULT NOTE ADULT - SUBJECTIVE AND OBJECTIVE BOX
ACUTE CARE SURGERY CONSULT NOTE  --------------------------------------------------------------------------------------------    Patient is a 68y old  Male who presents with a chief complaint of right leg pain     HPI: 68M with HTN on amlodipine and HLD on atorvastatin, underwent lumbar laminectomy in april 2023, bariatric surgery in [facility] many years ago presents after fall down ramp from standing onto right side. Neg hs neg LOC. Previous DVT was on eliquis but says he last took that one year ago.  C/o pain in RLE.  Fell at about 9:30am. No surgery on RLE in past, no fevers no chills, fall was mechanical.       ROS: 10-system review is otherwise negative except HPI above.      PAST MEDICAL & SURGICAL HISTORY:  HLD (hyperlipidemia)      Obstructive sleep apnea on C PAP  no longer using CPAP machine      Migraine headache      Bilateral otitis media      HTN (hypertension)      DVT (deep venous thrombosis)  LLE in 2019 treated with eliquis for 6 months      HLD (hyperlipidemia)      Vertigo      Chronic mastoiditis  right ear      Unspecified cholesteatoma, right ear      Otalgia, right ear      HTN (hypertension)      Poor historian      Renal cyst, left      Prediabetes  pt states A1c is 6. denies current use of meds      Lumbar herniated disc      History of umbilical hernia repair  x2      History of weight loss surgery  5 years ago in [place]      S/P shoulder surgery  left shoulder      S/P UPPP (uvulopalatopharyngoplasty)  >5 years ago      H/O prostate biopsy        FAMILY HISTORY:  FH: asthma  daughter, brother    SOCIAL HISTORY:      ALLERGIES: No Known Allergies      HOME MEDICATIONS:     CURRENT MEDICATIONS  MEDICATIONS (STANDING): morphine  - Injectable 4 milliGRAM(s) IV Push Once    MEDICATIONS (PRN):  --------------------------------------------------------------------------------------------    Vitals:   T(C): 36.7 (12-22-23 @ 16:15), Max: 36.7 (12-22-23 @ 16:15)  HR: 70 (12-22-23 @ 16:15) (70 - 81)  BP: 153/70 (12-22-23 @ 16:15) (145/73 - 153/70)  RR: 18 (12-22-23 @ 16:15) (18 - 18)  SpO2: 97% (12-22-23 @ 16:15) (94% - 97%)  CAPILLARY BLOOD GLUCOSE      PHYSICAL EXAM:   General: NAD, Lying in bed comfortably  Neuro: A+Ox3  HEENT: NC/AT, EOMI  Neck: Soft, supple  Cardio: RRR  Resp: Good effort, room air   GI/Abd: Soft, NT/ND, no rebound/guarding, no masses palpated, old lap port sites well healed   Vascular: palpable DP and PT pulses bilaterally  Skin: Intact, no breakdown no skin changes  Lymphatic/Nodes: No palpable lymphadenopathy  Musculoskeletal: RLE thigh anterior compartment firm, tender, not tense, posterior compartment soft and nontender, sensate, no skin changes over hematoma, moves toes but with pain limited flexion and extension at ankle, no sensory deficits at this time   --------------------------------------------------------------------------------------------    LABS  CBC (12-22 @ 12:05)                              14.7                           6.42    )----------------(  174        74.9  % Neutrophils, 16.2  % Lymphocytes, ANC: 4.81                                42.9      BMP (12-22 @ 12:05)             139     |  103     |  13    		Ca++ --      Ca 8.5                ---------------------------------( 145<H>		Mg --                 4.1     |  24      |  0.80  			Ph --        LFTs (12-22 @ 12:05)      TPro 6.0 / Alb 4.2 / TBili 0.4 / DBili -- / AST 21 / ALT 29 / AlkPhos 92    Coags (12-22 @ 12:05)  aPTT 28.7 / INR 0.97 / PT 10.9    Cardiac Markers (12-22 @ 12:05)     HSTrop: -- / CKMB: -- / CK: 123        --------------------------------------------------------------------------------------------    MICROBIOLOGY  Urinalysis (12-22 @ 12:05):     Color:  / Appearance:  / SG:  / pH:  / Gluc: 145<H> / Ketones:  / Bili:  / Urobili:  / Protein : / Nitrites:  / Leuk.Est:  / RBC:  / WBC:  / Sq Epi:  / Non Sq Epi:  / Bacteria          --------------------------------------------------------------------------------------------    IMAGING  < from: CT Lumbar Spine No Cont (12.22.23 @ 14:39) >  IMPRESSION:  Posterior instrumentation/fusion at L4-L5. No evidence for acute hardware   complication.  No acute fracture identified.    Please see separate CT hip report.    < end of copied text >  < from: Xray Femur 2 Views, Right (12.22.23 @ 13:09) >    Fractures:  None.    VISUALIZED LUMBAR SPINE: Pedicle screws and rods are partially imaged   extending to L5. L4-L5 disc spacer is noted.    HIP AND PELVIC JOINTS    Joint Space(s):  Maintained.  There are tiny marginal osteophytes of   both hips.    RIGHT KNEE JOINTS    Joint Space(s):  Maintained.    SOFT TISSUES:  Mild atherosclerotic calcifications are present.    IMPRESSION:  1.  No acute osseous abnormalities.    < end of copied text >     ACUTE CARE SURGERY CONSULT NOTE  --------------------------------------------------------------------------------------------    Patient is a 68y old  Male who presents with a chief complaint of right leg pain     HPI: 68M with HTN on amlodipine and HLD on atorvastatin, underwent lumbar laminectomy in april 2023, bariatric surgery in [facility] many years ago presents after fall down ramp from standing onto right side. Neg hs neg LOC. Previous DVT was on eliquis but says he last took that one year ago.  C/o pain in RLE.  Fell at about 9:30am. No surgery on RLE in past, no fevers no chills, fall was mechanical.       ROS: 10-system review is otherwise negative except HPI above.      PAST MEDICAL & SURGICAL HISTORY:  HLD (hyperlipidemia)      Obstructive sleep apnea on C PAP  no longer using CPAP machine      Migraine headache      Bilateral otitis media      HTN (hypertension)      DVT (deep venous thrombosis)  LLE in 2019 treated with eliquis for 6 months      HLD (hyperlipidemia)      Vertigo      Chronic mastoiditis  right ear      Unspecified cholesteatoma, right ear      Otalgia, right ear      HTN (hypertension)      Poor historian      Renal cyst, left      Prediabetes  pt states A1c is 6. denies current use of meds      Lumbar herniated disc      History of umbilical hernia repair  x2      History of weight loss surgery  5 years ago in [place]      S/P shoulder surgery  left shoulder      S/P UPPP (uvulopalatopharyngoplasty)  >5 years ago      H/O prostate biopsy        FAMILY HISTORY:  FH: asthma  daughter, brother    SOCIAL HISTORY:      ALLERGIES: No Known Allergies      HOME MEDICATIONS:     CURRENT MEDICATIONS  MEDICATIONS (STANDING): morphine  - Injectable 4 milliGRAM(s) IV Push Once    MEDICATIONS (PRN):  --------------------------------------------------------------------------------------------    Vitals:   T(C): 36.7 (12-22-23 @ 16:15), Max: 36.7 (12-22-23 @ 16:15)  HR: 70 (12-22-23 @ 16:15) (70 - 81)  BP: 153/70 (12-22-23 @ 16:15) (145/73 - 153/70)  RR: 18 (12-22-23 @ 16:15) (18 - 18)  SpO2: 97% (12-22-23 @ 16:15) (94% - 97%)  CAPILLARY BLOOD GLUCOSE      PHYSICAL EXAM:   General: NAD, Lying in bed comfortably  Neuro: A+Ox3  HEENT: NC/AT, EOMI  Neck: Soft, supple  Cardio: RRR  Resp: Good effort, room air   GI/Abd: Soft, NT/ND, no rebound/guarding, no masses palpated, old lap port sites well healed   Vascular: palpable DP and PT pulses bilaterally  Skin: Intact, no breakdown no skin changes  Lymphatic/Nodes: No palpable lymphadenopathy  Musculoskeletal: RLE thigh anterior compartment firm, tender, not tense, posterior compartment soft and nontender, sensate, no skin changes over hematoma, moves toes but with pain limited flexion and extension at ankle, no sensory deficits at this time   --------------------------------------------------------------------------------------------    LABS  CBC (12-22 @ 12:05)                              14.7                           6.42    )----------------(  174        74.9  % Neutrophils, 16.2  % Lymphocytes, ANC: 4.81                                42.9      BMP (12-22 @ 12:05)             139     |  103     |  13    		Ca++ --      Ca 8.5                ---------------------------------( 145<H>		Mg --                 4.1     |  24      |  0.80  			Ph --        LFTs (12-22 @ 12:05)      TPro 6.0 / Alb 4.2 / TBili 0.4 / DBili -- / AST 21 / ALT 29 / AlkPhos 92    Coags (12-22 @ 12:05)  aPTT 28.7 / INR 0.97 / PT 10.9    Cardiac Markers (12-22 @ 12:05)     HSTrop: -- / CKMB: -- / CK: 123        --------------------------------------------------------------------------------------------    MICROBIOLOGY  Urinalysis (12-22 @ 12:05):     Color:  / Appearance:  / SG:  / pH:  / Gluc: 145<H> / Ketones:  / Bili:  / Urobili:  / Protein : / Nitrites:  / Leuk.Est:  / RBC:  / WBC:  / Sq Epi:  / Non Sq Epi:  / Bacteria          --------------------------------------------------------------------------------------------    IMAGING  < from: CT Lumbar Spine No Cont (12.22.23 @ 14:39) >  IMPRESSION:  Posterior instrumentation/fusion at L4-L5. No evidence for acute hardware   complication.  No acute fracture identified.    Please see separate CT hip report.    < end of copied text >  < from: Xray Femur 2 Views, Right (12.22.23 @ 13:09) >    Fractures:  None.    VISUALIZED LUMBAR SPINE: Pedicle screws and rods are partially imaged   extending to L5. L4-L5 disc spacer is noted.    HIP AND PELVIC JOINTS    Joint Space(s):  Maintained.  There are tiny marginal osteophytes of   both hips.    RIGHT KNEE JOINTS    Joint Space(s):  Maintained.    SOFT TISSUES:  Mild atherosclerotic calcifications are present.    IMPRESSION:  1.  No acute osseous abnormalities.    < end of copied text >

## 2023-12-22 NOTE — H&P ADULT - NSICDXPASTSURGICALHX_GEN_ALL_CORE_FT
PAST SURGICAL HISTORY:  H/O prostate biopsy     History of umbilical hernia repair x2    History of weight loss surgery 5 years ago in [place]    S/P shoulder surgery left shoulder    S/P UPPP (uvulopalatopharyngoplasty) >5 years ago     PAST SURGICAL HISTORY:  H/O prostate biopsy     History of umbilical hernia repair x2    History of weight loss surgery 5 years ago in [place]    S/P shoulder surgery left shoulder    S/P UPPP (uvulopalatopharyngoplasty) >5 years ago

## 2023-12-22 NOTE — H&P ADULT - NSICDXPASTMEDICALHX_GEN_ALL_CORE_FT
PAST MEDICAL HISTORY:  Chronic mastoiditis right ear    DVT (deep venous thrombosis) LLE in 2019 treated with eliquis for 6 months    HLD (hyperlipidemia)     HTN (hypertension)     Lumbar herniated disc

## 2023-12-22 NOTE — ED PROVIDER NOTE - ADMIT DISPOSITION PRESENT ON ADMISSION SEPSIS
Per chart review, lab letter was mailed to patient.   
Pt called requesting lab results from 09/30/2021. Informed pt message will be sent to provider for review. She would get a call with providers recommendations.     Please advice on results.    Pt can be contacted at [phone].  
No

## 2023-12-22 NOTE — ED ADULT NURSE NOTE - OBJECTIVE STATEMENT
Received report from CHRISS bruno. Patient received in room 20. A&O4. Respirations even and unlabored. Came into ED s/p R sided fall. Patient with Right hematoma on hip. Patient appears well, no signs of acute distress noted. Denies SOb, chest pain, N/V/D, fevers. Comfort and safety maintainted. Pending bed assignment. Stretcher in lowest position. Call bell within reach.

## 2023-12-22 NOTE — ED PROVIDER NOTE - OBJECTIVE STATEMENT
5-year-old male with a past medical history of hypertension, hyperlipidemia, recent lumbar laminectomy presenting with right hip pain. Patient was walking in his apartment and was walking down the ramp and slipped. Patient landed on his right hip. Patient complaining of low back pain and right hip pain. Patient denies head trauma, loss of consciousness chest pain, difficulty breathing, palpitations, nausea, vomiting. Patient was on Eliquis for a right lower extremity DVT however has not taken Eliquis in 1 month. 68-year-old male with a past medical history of hypertension, hyperlipidemia, recent lumbar laminectomy presenting with right hip pain. Patient was walking in his apartment and was walking down the ramp and slipped. Patient landed on his right hip. Patient complaining of low back pain and right hip pain. Patient denies head trauma, loss of consciousness chest pain, difficulty breathing, palpitations, nausea, vomiting. Patient was on Eliquis for a right lower extremity DVT however has not taken Eliquis in 1 month.  Attending - Agree with above.  I evaluated patient myself. 69 y/o M with brother at bedside.  s/p lumbar laminectomy few months ago.  s/p trip and fall onto right hip.  c/o right hip and thigh pain and inability to move hip.  Also low back pain.  No head trauma/LOC.  No preceding lightheadedness. 68-year-old male with a past medical history of hypertension, hyperlipidemia, recent lumbar laminectomy presenting with right hip pain. Patient was walking in his apartment and was walking down the ramp and slipped. Patient landed on his right hip. Patient complaining of low back pain and right hip pain. Patient denies head trauma, loss of consciousness chest pain, difficulty breathing, palpitations, nausea, vomiting. Patient was on Eliquis for a right lower extremity DVT however has not taken Eliquis in 1 month.  Attending - Agree with above.  I evaluated patient myself. 67 y/o M with brother at bedside.  s/p lumbar laminectomy few months ago.  s/p trip and fall onto right hip.  c/o right hip and thigh pain and inability to move hip.  Also low back pain.  No head trauma/LOC.  No preceding lightheadedness.

## 2023-12-22 NOTE — CONSULT NOTE ADULT - ASSESSMENT
Assessment: 68 year old man with HTN, HLD, old dvt not on AC for 1 year with rle hematoma after fall, normal CK, pulses intact and motor and sensory intact, pain over hematoma.      Recs:  - no evidence of compartment syndrome at this time  - serial exams  - medical admission for pain control   - D/w attending Dr. Donta Chow MD, PGY3  B Team Surgery   [id number] Assessment: 68 year old man with HTN, HLD, old dvt not on AC for 1 year with rle hematoma after fall, normal CK, pulses intact and motor and sensory intact, pain over hematoma.      Recs:  - no evidence of compartment syndrome at this time  - serial exams  - medical admission for pain control   - D/w attending Dr. Donta Chow MD, PGY3  B Team Surgery   [id number]

## 2023-12-22 NOTE — H&P ADULT - NSHPLABSRESULTS_GEN_ALL_CORE
.  LABS:                         14.7   6.42  )-----------( 174      ( 22 Dec 2023 12:05 )             42.9     12-22    139  |  103  |  13  ----------------------------<  145<H>  4.1   |  24  |  0.80    Ca    8.5      22 Dec 2023 12:05    TPro  6.0  /  Alb  4.2  /  TBili  0.4  /  DBili  x   /  AST  21  /  ALT  29  /  AlkPhos  92  12-22    PT/INR - ( 22 Dec 2023 12:05 )   PT: 10.9 sec;   INR: 0.97 ratio         PTT - ( 22 Dec 2023 12:05 )  PTT:28.7 sec  Urinalysis Basic - ( 22 Dec 2023 12:05 )    Color: x / Appearance: x / SG: x / pH: x  Gluc: 145 mg/dL / Ketone: x  / Bili: x / Urobili: x   Blood: x / Protein: x / Nitrite: x   Leuk Esterase: x / RBC: x / WBC x   Sq Epi: x / Non Sq Epi: x / Bacteria: x            RADIOLOGY, EKG & ADDITIONAL TESTS: Reviewed.

## 2023-12-22 NOTE — H&P ADULT - PROBLEM SELECTOR PLAN 5
Diet: regular diet   DVT:   Dispo: Diet: regular diet   DVT: lovenox 40mg qday   Dispo: Pending clinical course

## 2023-12-22 NOTE — H&P ADULT - NSHPREVIEWOFSYSTEMS_GEN_ALL_CORE
REVIEW OF SYSTEMS:    CONSTITUTIONAL: No weakness, fevers or chills  EYES/ENT: No visual changes;  No vertigo or throat pain   NECK: No pain or stiffness  RESPIRATORY: No cough, wheezing, hemoptysis; No shortness of breath  CARDIOVASCULAR: No chest pain or palpitations  GASTROINTESTINAL: No abdominal or epigastric pain. No nausea, vomiting, or hematemesis; No diarrhea or constipation. No melena or hematochezia.  GENITOURINARY: No dysuria, frequency or hematuria  NEUROLOGICAL: + weakness of RLE. No numbness  SKIN: No itching, rashes

## 2023-12-22 NOTE — ED PROVIDER NOTE - PROGRESS NOTE DETAILS
Caleb Robles, PGY2 - patient signed out to me pending Ct hip non con read. Patient still in pain. 3rd dose of morphine ordered before the change of shift. Patient wants to stay. States cannot walk due to pain. Will admit after CT read is back to hospitalist.

## 2023-12-22 NOTE — H&P ADULT - NSHPPHYSICALEXAM_GEN_ALL_CORE
PHYSICAL EXAM:  GENERAL: NAD, well-groomed, well-developed  HEAD:  Atraumatic, Normocephalic  EYES: EOMI, PERRLA, conjunctiva and sclera clear  ENMT: No tonsillar erythema, exudates, or enlargement; Moist mucous membranes  NECK: Supple, No JVD, Normal thyroid  HEART: Regular rate and rhythm; distant heart sounds due to body habitus. No murmurs, rubs, or gallops  RESPIRATORY: CTA B/L, No W/R/R  ABDOMEN: Soft, Nontender, Nondistended; Bowel sounds present  NEUROLOGY: A&Ox3, nonfocal, moving upper bilat extremities, LLE 5/5 strength, RLE 3-/5 with extension, 20 degree flexion of RLE. Anterior upper R thigh tender to palpation, no erythema, compressible but tense.   EXTREMITIES:  2+ Peripheral Pulses, No clubbing, cyanosis, or edema  SKIN: warm, dry, normal color, no rash or abnormal lesions. RLE: anterior thigh minimal warm to touch.

## 2023-12-22 NOTE — ED PROVIDER NOTE - CLINICAL SUMMARY MEDICAL DECISION MAKING FREE TEXT BOX
68-year-old male with past medical history of hypertension, hyperlipidemia, recent lumbar laminectomy presenting with right hip pain and low back pain after a fall. Patient has pain with ambulation and movement of the right hip. Patient has tense right thigh. Bilateral DP pulses 2+. Concern for possible hip fracture versus femur fracture. Pain control, imaging.

## 2023-12-22 NOTE — H&P ADULT - PROBLEM SELECTOR PLAN 1
- RLE anterior thigh hematoma s/p fall   - CT R hip showing hematoma of RLE   - ortho evaluated pt for compartment syndrome, no evidence at this time. pulses intact/sensations intact   - pain control with tylenol and oxycodone prn   - serial exams for compartment syndrome   - PT/OT consult - RLE anterior thigh hematoma s/p fall   - CT R hip showing hematoma of RLE   - ortho evaluated pt for compartment syndrome, no evidence at this time. pulses intact/sensations intact   - pain control with tylenol and oxycodone prn  - lidocaine path as needed   - serial exams for compartment syndrome   - PT/OT consult

## 2023-12-22 NOTE — H&P ADULT - ASSESSMENT
67yo male with pmh HTN, HLD, prev DVT, history of lumbar laminectomy in April 2023, bariatric surgery 15 years ago presenting after a fall at work, fell on his right side. Patient denied any dizziness prior to fall, denied any head trauma, no LOC, reports pain at R anterior thigh. Xray of RLE showed no fractures, CT R hip hematoma. VSS, labs were unremarkable. Admitted for management of thigh hematoma and fall.

## 2023-12-22 NOTE — ED PROVIDER NOTE - PHYSICAL EXAMINATION
General: Appears well and nontoxic  Mentation: AAO x 3  psych: mood appropriate  HEENT: airway patent, conjunctivae clear bilaterally  Resp: symmetric chest rise, no resp distress, breath sounds CTA bilaterally  Cardio: RRR, no m/r/g, 2+ DP pulse bilaterally  GI: soft/nondistended/nontender  Neuro: sensation and motor function grossly intact  Skin: no cyanosis, no jaundice  MSK: normal movement of all extremities, right hip tenderness, pain with hip ROM  Lymph/Vasc: no LE edema General: Appears well and nontoxic  Mentation: AAO x 3  psych: mood appropriate  HEENT: airway patent, conjunctivae clear bilaterally  Resp: symmetric chest rise, no resp distress, breath sounds CTA bilaterally  Cardio: RRR, no m/r/g, 2+ DP pulse bilaterally  GI: soft/nondistended/nontender  Neuro: sensation and motor function grossly intact  Skin: no cyanosis, no jaundice  MSK: normal movement of all extremities, right hip tenderness, pain with hip ROM  Lymph/Vasc: no LE edema  ATTENDING PHYSICAL EXAM  GEN - NAD; well appearing; A+O x3  HEAD - NC/AT; EYES/NOSE - PERRL, EOMI, mucous membranes moist, no discharge; THROAT: Oral cavity and pharynx normal. No inflammation, swelling, exudate, or lesions  NECK: Neck supple, non-tender without lymphadenopathy, no masses, no JVD  PULMONARY - CTA b/l, symmetric breath sounds, no w/r/r  CARDIAC -s1s2, RRR, no M,R,G  ABDOMEN - +NABS, ND, NT, soft, no guarding, no rebound, no masses   BACK - + lumbar area TTP b/l and vertebral.    EXTREMITIES - + right hip pain.  No obvious deformity or shortening/rotation of leg.  + firm right thigh anterior compartment.  right foot cool.  2+ DP/PT pulses.  No LE pallor.  Decreased movement of right ankle, foot, toes possibly due to pain, but consider nerve or compartment syndrome as etiology  SKIN - no rash or bruising   NEUROLOGIC - alert, CN 2-12 intact, Motor intact besides right hip.

## 2023-12-22 NOTE — ED PROVIDER NOTE - NSICDXPASTSURGICALHX_GEN_ALL_CORE_FT
PAST SURGICAL HISTORY:  H/O prostate biopsy     History of umbilical hernia repair x2    History of weight loss surgery 5 years ago in [place]    S/P shoulder surgery left shoulder    S/P UPPP (uvulopalatopharyngoplasty) >5 years ago     PAST SURGICAL HISTORY:  H/O prostate biopsy     History of umbilical hernia repair x2    History of weight loss surgery 5 years ago in [place]    S/P shoulder surgery left shoulder    S/P UPPP (uvulopalatopharyngoplasty) >5 years ago

## 2023-12-22 NOTE — ED ADULT NURSE NOTE - NSICDXPASTSURGICALHX_GEN_ALL_CORE_FT
PAST SURGICAL HISTORY:  H/O prostate biopsy     History of umbilical hernia repair x2    History of weight loss surgery 5 years ago in [place]    S/P shoulder surgery left shoulder    S/P UPPP (uvulopalatopharyngoplasty) >5 years ago     PAST SURGICAL HISTORY:  H/O prostate biopsy     History of umbilical hernia repair x2    History of weight loss surgery 5 years ago in [place]    S/P shoulder surgery left shoulder    S/P UPPP (uvulopalatopharyngoplasty) >5 years ago

## 2023-12-22 NOTE — CONSULT NOTE ADULT - ASSESSMENT
ASSESSMENT & PLAN  A/P:  68 year old male s/p fall with swollen R thigh    WBAT RLE  Low suspicion for acute compartment syndrome at this time.    Jaquelin Howard PA-C  Team Pager #[phone]    *** INCOMPLETE NOTE ***     ASSESSMENT & PLAN  A/P:  68 year old male s/p fall with swollen R thigh    WBAT RLE  Low suspicion for acute compartment syndrome at this time.    Jaquelin Howard PA-C  Team Pager #[phone]    *** INCOMPLETE NOTE ***     ASSESSMENT & PLAN  A/P:  68 year old male s/p fall with difficulty ambulating and swollen R thigh though imaging negative for any fracture.  Ortho called to r/o compartment syndrome     PLAN  WBAT RLE  Pain control as needed  Low suspicion for acute compartment syndrome at this time  No fracture or dislocation appreciated on any imaging    ASSESSMENT & PLAN  A/P:  68 year old male s/p fall with difficulty ambulating and swollen R thigh though imaging negative for any fracture.  Ortho called to r/o compartment syndrome     PLAN  WBAT RLE  Pain control as needed  Low suspicion for acute compartment syndrome at this time  No fracture or dislocation appreciated on any imaging   Would advise general surgery vs vascular surgery evaluation if continued concern for soft tissue hematoma

## 2023-12-22 NOTE — H&P ADULT - NSHPSOCIALHISTORY_GEN_ALL_CORE
Patient lives at home by himself  Works as superintendent   Denies any tobacco usage, minimal occasional alcohol use, no drug use

## 2023-12-23 LAB
ANION GAP SERPL CALC-SCNC: 11 MMOL/L — SIGNIFICANT CHANGE UP (ref 7–14)
ANION GAP SERPL CALC-SCNC: 11 MMOL/L — SIGNIFICANT CHANGE UP (ref 7–14)
BUN SERPL-MCNC: 11 MG/DL — SIGNIFICANT CHANGE UP (ref 7–23)
BUN SERPL-MCNC: 11 MG/DL — SIGNIFICANT CHANGE UP (ref 7–23)
CALCIUM SERPL-MCNC: 8.3 MG/DL — LOW (ref 8.4–10.5)
CALCIUM SERPL-MCNC: 8.3 MG/DL — LOW (ref 8.4–10.5)
CHLORIDE SERPL-SCNC: 102 MMOL/L — SIGNIFICANT CHANGE UP (ref 98–107)
CHLORIDE SERPL-SCNC: 102 MMOL/L — SIGNIFICANT CHANGE UP (ref 98–107)
CO2 SERPL-SCNC: 23 MMOL/L — SIGNIFICANT CHANGE UP (ref 22–31)
CO2 SERPL-SCNC: 23 MMOL/L — SIGNIFICANT CHANGE UP (ref 22–31)
CREAT SERPL-MCNC: 0.74 MG/DL — SIGNIFICANT CHANGE UP (ref 0.5–1.3)
CREAT SERPL-MCNC: 0.74 MG/DL — SIGNIFICANT CHANGE UP (ref 0.5–1.3)
EGFR: 97 ML/MIN/1.73M2 — SIGNIFICANT CHANGE UP
EGFR: 97 ML/MIN/1.73M2 — SIGNIFICANT CHANGE UP
GLUCOSE SERPL-MCNC: 121 MG/DL — HIGH (ref 70–99)
GLUCOSE SERPL-MCNC: 121 MG/DL — HIGH (ref 70–99)
HCT VFR BLD CALC: 44.4 % — SIGNIFICANT CHANGE UP (ref 39–50)
HCT VFR BLD CALC: 44.4 % — SIGNIFICANT CHANGE UP (ref 39–50)
HCV AB S/CO SERPL IA: 0.11 S/CO — SIGNIFICANT CHANGE UP (ref 0–0.99)
HCV AB S/CO SERPL IA: 0.11 S/CO — SIGNIFICANT CHANGE UP (ref 0–0.99)
HCV AB SERPL-IMP: SIGNIFICANT CHANGE UP
HCV AB SERPL-IMP: SIGNIFICANT CHANGE UP
HGB BLD-MCNC: 14.7 G/DL — SIGNIFICANT CHANGE UP (ref 13–17)
HGB BLD-MCNC: 14.7 G/DL — SIGNIFICANT CHANGE UP (ref 13–17)
MAGNESIUM SERPL-MCNC: 1.8 MG/DL — SIGNIFICANT CHANGE UP (ref 1.6–2.6)
MAGNESIUM SERPL-MCNC: 1.8 MG/DL — SIGNIFICANT CHANGE UP (ref 1.6–2.6)
MCHC RBC-ENTMCNC: 29.9 PG — SIGNIFICANT CHANGE UP (ref 27–34)
MCHC RBC-ENTMCNC: 29.9 PG — SIGNIFICANT CHANGE UP (ref 27–34)
MCHC RBC-ENTMCNC: 33.1 GM/DL — SIGNIFICANT CHANGE UP (ref 32–36)
MCHC RBC-ENTMCNC: 33.1 GM/DL — SIGNIFICANT CHANGE UP (ref 32–36)
MCV RBC AUTO: 90.2 FL — SIGNIFICANT CHANGE UP (ref 80–100)
MCV RBC AUTO: 90.2 FL — SIGNIFICANT CHANGE UP (ref 80–100)
NRBC # BLD: 0 /100 WBCS — SIGNIFICANT CHANGE UP (ref 0–0)
NRBC # BLD: 0 /100 WBCS — SIGNIFICANT CHANGE UP (ref 0–0)
NRBC # FLD: 0 K/UL — SIGNIFICANT CHANGE UP (ref 0–0)
NRBC # FLD: 0 K/UL — SIGNIFICANT CHANGE UP (ref 0–0)
PHOSPHATE SERPL-MCNC: 2.9 MG/DL — SIGNIFICANT CHANGE UP (ref 2.5–4.5)
PHOSPHATE SERPL-MCNC: 2.9 MG/DL — SIGNIFICANT CHANGE UP (ref 2.5–4.5)
PLATELET # BLD AUTO: 154 K/UL — SIGNIFICANT CHANGE UP (ref 150–400)
PLATELET # BLD AUTO: 154 K/UL — SIGNIFICANT CHANGE UP (ref 150–400)
POTASSIUM SERPL-MCNC: 4.3 MMOL/L — SIGNIFICANT CHANGE UP (ref 3.5–5.3)
POTASSIUM SERPL-MCNC: 4.3 MMOL/L — SIGNIFICANT CHANGE UP (ref 3.5–5.3)
POTASSIUM SERPL-SCNC: 4.3 MMOL/L — SIGNIFICANT CHANGE UP (ref 3.5–5.3)
POTASSIUM SERPL-SCNC: 4.3 MMOL/L — SIGNIFICANT CHANGE UP (ref 3.5–5.3)
RBC # BLD: 4.92 M/UL — SIGNIFICANT CHANGE UP (ref 4.2–5.8)
RBC # BLD: 4.92 M/UL — SIGNIFICANT CHANGE UP (ref 4.2–5.8)
RBC # FLD: 13.3 % — SIGNIFICANT CHANGE UP (ref 10.3–14.5)
RBC # FLD: 13.3 % — SIGNIFICANT CHANGE UP (ref 10.3–14.5)
SODIUM SERPL-SCNC: 136 MMOL/L — SIGNIFICANT CHANGE UP (ref 135–145)
SODIUM SERPL-SCNC: 136 MMOL/L — SIGNIFICANT CHANGE UP (ref 135–145)
WBC # BLD: 6.45 K/UL — SIGNIFICANT CHANGE UP (ref 3.8–10.5)
WBC # BLD: 6.45 K/UL — SIGNIFICANT CHANGE UP (ref 3.8–10.5)
WBC # FLD AUTO: 6.45 K/UL — SIGNIFICANT CHANGE UP (ref 3.8–10.5)
WBC # FLD AUTO: 6.45 K/UL — SIGNIFICANT CHANGE UP (ref 3.8–10.5)

## 2023-12-23 PROCEDURE — 99233 SBSQ HOSP IP/OBS HIGH 50: CPT

## 2023-12-23 RX ORDER — ACETAMINOPHEN 500 MG
975 TABLET ORAL EVERY 6 HOURS
Refills: 0 | Status: DISCONTINUED | OUTPATIENT
Start: 2023-12-23 | End: 2023-12-27

## 2023-12-23 RX ORDER — ENOXAPARIN SODIUM 100 MG/ML
40 INJECTION SUBCUTANEOUS EVERY 24 HOURS
Refills: 0 | Status: DISCONTINUED | OUTPATIENT
Start: 2023-12-23 | End: 2023-12-27

## 2023-12-23 RX ORDER — AMLODIPINE BESYLATE 2.5 MG/1
10 TABLET ORAL DAILY
Refills: 0 | Status: DISCONTINUED | OUTPATIENT
Start: 2023-12-23 | End: 2023-12-23

## 2023-12-23 RX ADMIN — Medication 975 MILLIGRAM(S): at 17:07

## 2023-12-23 RX ADMIN — HYDROMORPHONE HYDROCHLORIDE 0.5 MILLIGRAM(S): 2 INJECTION INTRAMUSCULAR; INTRAVENOUS; SUBCUTANEOUS at 12:09

## 2023-12-23 RX ADMIN — ENOXAPARIN SODIUM 40 MILLIGRAM(S): 100 INJECTION SUBCUTANEOUS at 19:16

## 2023-12-23 RX ADMIN — Medication 975 MILLIGRAM(S): at 23:14

## 2023-12-23 RX ADMIN — Medication 975 MILLIGRAM(S): at 16:07

## 2023-12-23 RX ADMIN — HYDROMORPHONE HYDROCHLORIDE 0.5 MILLIGRAM(S): 2 INJECTION INTRAMUSCULAR; INTRAVENOUS; SUBCUTANEOUS at 06:29

## 2023-12-23 RX ADMIN — HYDROMORPHONE HYDROCHLORIDE 0.5 MILLIGRAM(S): 2 INJECTION INTRAMUSCULAR; INTRAVENOUS; SUBCUTANEOUS at 19:16

## 2023-12-23 RX ADMIN — HYDROMORPHONE HYDROCHLORIDE 0.5 MILLIGRAM(S): 2 INJECTION INTRAMUSCULAR; INTRAVENOUS; SUBCUTANEOUS at 05:49

## 2023-12-23 RX ADMIN — HYDROMORPHONE HYDROCHLORIDE 0.5 MILLIGRAM(S): 2 INJECTION INTRAMUSCULAR; INTRAVENOUS; SUBCUTANEOUS at 19:31

## 2023-12-23 NOTE — PROGRESS NOTE ADULT - ASSESSMENT
69yo male with pmh HTN, HLD, prev DVT, history of lumbar laminectomy in April 2023, bariatric surgery 15 years ago presenting after a fall at work, fell on his right side. Patient denied any dizziness prior to fall, denied any head trauma, no LOC, reports pain at R anterior thigh. Xray of RLE showed no fractures, CT R hip hematoma. VSS, labs were unremarkable. Admitted for management of thigh hematoma and fall.

## 2023-12-23 NOTE — PHYSICAL THERAPY INITIAL EVALUATION ADULT - ACTIVE RANGE OF MOTION EXAMINATION, REHAB EVAL
Pt unable to perform right hip and knee flexion in semisupine position secondary to pain./molly. upper extremity Active ROM was WNL (within normal limits)

## 2023-12-23 NOTE — PROGRESS NOTE ADULT - SUBJECTIVE AND OBJECTIVE BOX
SURGERY PROGRESS NOTE    SUBJECTIVE / 24H EVENTS:  Patient seen and examined on morning rounds. No acute events overnight. Patient endorsing pain but reports improved from overnight      OBJECTIVE:  VITAL SIGNS:  T(C): 37 (12-23-23 @ 13:40), Max: 37 (12-23-23 @ 13:40)  HR: 82 (12-23-23 @ 13:40) (64 - 96)  BP: 141/79 (12-23-23 @ 13:40) (122/73 - 141/79)  RR: 18 (12-23-23 @ 13:40) (18 - 20)  SpO2: 100% (12-23-23 @ 13:40) (95% - 100%)  Daily     Daily       PHYSICAL EXAM:  Gen: NAD  LS: Respirations unlabored on RA  Extremitiesl: RLE thigh anterior compartment firm, tender, not tense, posterior compartment soft and nontender, sensate, no skin changes over hematoma, moves toes but with pain limited flexion and extension at ankle, no sensory deficits at this time         LAB VALUES:  12-23    136  |  102  |  11  ----------------------------<  121<H>  4.3   |  23  |  0.74    Ca    8.3<L>      23 Dec 2023 06:59  Phos  2.9     12-23  Mg     1.80     12-23    TPro  6.0  /  Alb  4.2  /  TBili  0.4  /  DBili  x   /  AST  21  /  ALT  29  /  AlkPhos  92  12-22                               14.7   6.45  )-----------( 154      ( 23 Dec 2023 06:59 )             44.4     LIVER FUNCTIONS - ( 22 Dec 2023 12:05 )  Alb: 4.2 g/dL / Pro: 6.0 g/dL / ALK PHOS: 92 U/L / ALT: 29 U/L / AST: 21 U/L / GGT: x           PT/INR - ( 22 Dec 2023 12:05 )   PT: 10.9 sec;   INR: 0.97 ratio         PTT - ( 22 Dec 2023 12:05 )  PTT:28.7 sec    CARDIAC MARKERS ( 22 Dec 2023 12:05 )  x     / x     / 123 U/L / x     / x          Urinalysis Basic - ( 23 Dec 2023 06:59 )    Color: x / Appearance: x / SG: x / pH: x  Gluc: 121 mg/dL / Ketone: x  / Bili: x / Urobili: x   Blood: x / Protein: x / Nitrite: x   Leuk Esterase: x / RBC: x / WBC x   Sq Epi: x / Non Sq Epi: x / Bacteria: x        MICROBIOLOGY:      RADIOLOGY:        MEDICATIONS  (STANDING):  acetaminophen     Tablet .. 975 milliGRAM(s) Oral every 6 hours  amLODIPine   Tablet 10 milliGRAM(s) Oral daily  atorvastatin 80 milliGRAM(s) Oral at bedtime  enoxaparin Injectable 40 milliGRAM(s) SubCutaneous every 24 hours  lidocaine   4% Patch 1 Patch Transdermal daily  tamsulosin 0.4 milliGRAM(s) Oral at bedtime    MEDICATIONS  (PRN):  HYDROmorphone  Injectable 0.5 milliGRAM(s) IV Push every 6 hours PRN Severe Pain (7 - 10)  oxyCODONE    IR 5 milliGRAM(s) Oral every 6 hours PRN Moderate Pain (4 - 6)  polyethylene glycol 3350 17 Gram(s) Oral daily PRN Constipation

## 2023-12-23 NOTE — CHART NOTE - NSCHARTNOTEFT_GEN_A_CORE
Pt seen and examined at bedside in 9T. Daughter in room. Answered questions for both pt and daughter (from [place]). Pt states pain is stable and relieved by pain medication. On exam, right anterior thigh is soft and mildly tender to palpation. Posterior thigh soft non tender. R calf soft non tender. Palpable DP and PT on RLE. Sensation intact and equal in b/l lower extremities. Dorsiflexion, plantarflexion, hip flexion/extension intact. Improving exam. Please call ASAP if changes in exam/concerns. Explained this to family.     B team surgery   [id number] Pt seen and examined at bedside in 9T. Daughter in room. Answered questions for both pt and daughter (from [place]). Pt states pain is stable and relieved by pain medication. On exam, right anterior thigh is soft and mildly tender to palpation. Posterior thigh soft non tender. R calf soft non tender. Palpable DP and PT on RLE. Sensation intact and equal in b/l lower extremities. Dorsiflexion, plantarflexion, hip flexion/extension intact. Improving exam. Please call ASAP if changes in exam/concerns. Explained this to family.     B team surgery   [id number]

## 2023-12-23 NOTE — PROGRESS NOTE ADULT - ASSESSMENT
Assessment: 68 year old man with HTN, HLD, old dvt not on AC for 1 year with rle hematoma after fall, normal CK, pulses intact and motor and sensory intact, pain over hematoma.      Recs:  - no evidence of compartment syndrome at this time  - pain control  - PT  - serial exams      B Team Surgery   [id number] Assessment: 68 year old man with HTN, HLD, old dvt not on AC for 1 year with rle hematoma after fall, normal CK, pulses intact and motor and sensory intact, pain over hematoma.      Recs:  - no evidence of compartment syndrome at this time  - pain control  - PT  - serial exams      B Team Surgery   [id number]

## 2023-12-23 NOTE — CHART NOTE - NSCHARTNOTEFT_GEN_A_CORE
Came to patient bedside in ED for follow up exam of the RLE. Patient is complaining of continued pain, which has been stable/ unchanged , on pain medication. Patient states he is unhappy with level of care and wants a room. On exam of RLE, Right anterior thigh is very firm/tense and very tender to palpation. Right Posterior thigh soft, minimally tender. Right calf soft and mildly tender to palpation. Palpable DP and PT on RLE. Sensation of RLE diminished compared to LLE. Notified senior and chief resident.     B team surgery   [id number] Came to patient bedside in ED for follow up exam of the RLE. Patient is complaining of continued pain, which has been stable/ unchanged , on pain medication. Patient states he is unhappy with level of care and wants a room. On exam of RLE, Right anterior thigh is very firm/tense and very tender to palpation. Right Posterior thigh soft, minimally tender. Right calf soft and mildly tender to palpation. Palpable DP and PT on RLE. Sensation of RLE diminished compared to LLE. Notified senior and chief resident.     B team surgery   [id number]

## 2023-12-23 NOTE — PROGRESS NOTE ADULT - SUBJECTIVE AND OBJECTIVE BOX
[facility] Division of Hospital Medicine  Chante Douglas MD  Pager (M-F, 8A-5P): [phone]  Other Times:  [phone]    Patient is a 68y old  Male who presents with a chief complaint of Fall (22 Dec 2023 23:30)      SUBJECTIVE / OVERNIGHT EVENTS:  No acute events overnight.  Pt with RLE with pain laterally.      MEDICATIONS  (STANDING):  amLODIPine   Tablet 10 milliGRAM(s) Oral daily  atorvastatin 80 milliGRAM(s) Oral at bedtime  enoxaparin Injectable 40 milliGRAM(s) SubCutaneous every 24 hours  lidocaine   4% Patch 1 Patch Transdermal daily  tamsulosin 0.4 milliGRAM(s) Oral at bedtime    MEDICATIONS  (PRN):  acetaminophen     Tablet .. 650 milliGRAM(s) Oral every 6 hours PRN Temp greater or equal to 38C (100.4F), Mild Pain (1 - 3)  HYDROmorphone  Injectable 0.5 milliGRAM(s) IV Push every 6 hours PRN Severe Pain (7 - 10)  oxyCODONE    IR 5 milliGRAM(s) Oral every 6 hours PRN Moderate Pain (4 - 6)  polyethylene glycol 3350 17 Gram(s) Oral daily PRN Constipation      CAPILLARY BLOOD GLUCOSE        I&O's Summary      PHYSICAL EXAM:  Vital Signs Last 24 Hrs  T(C): 37 (23 Dec 2023 13:40), Max: 37 (23 Dec 2023 13:40)  T(F): 98.6 (23 Dec 2023 13:40), Max: 98.6 (23 Dec 2023 13:40)  HR: 82 (23 Dec 2023 13:40) (64 - 96)  BP: 141/79 (23 Dec 2023 13:40) (122/73 - 153/70)  BP(mean): --  RR: 18 (23 Dec 2023 13:40) (18 - 20)  SpO2: 100% (23 Dec 2023 13:40) (95% - 100%)    Parameters below as of 23 Dec 2023 13:40  Patient On (Oxygen Delivery Method): room air        CONSTITUTIONAL: NAD, well-developed, well-groomed  EYES: EOMI; conjunctiva and sclera clear  ENMT: Moist oral mucosa  RESPIRATORY: Normal respiratory effort; lungs are clear to auscultation bilaterally  CARDIOVASCULAR: Regular rate and rhythm, normal S1 and S2, no murmur; No lower extremity edema  ABDOMEN: Nontender to palpation, normoactive bowel sounds, no rebound/guarding  MUSCULOSKELETAL:   RLE lateral thigh with tenderness  PSYCH: A+O to person, place, and time; affect appropriate  NEUROLOGY: CN 2-12 are intact and symmetric; no gross sensory deficits   SKIN: No rashes; no palpable lesions    LABS:                        14.7   6.45  )-----------( 154      ( 23 Dec 2023 06:59 )             44.4     12-23    136  |  102  |  11  ----------------------------<  121<H>  4.3   |  23  |  0.74    Ca    8.3<L>      23 Dec 2023 06:59  Phos  2.9     12-23  Mg     1.80     12-23    TPro  6.0  /  Alb  4.2  /  TBili  0.4  /  DBili  x   /  AST  21  /  ALT  29  /  AlkPhos  92  12-22    PT/INR - ( 22 Dec 2023 12:05 )   PT: 10.9 sec;   INR: 0.97 ratio         PTT - ( 22 Dec 2023 12:05 )  PTT:28.7 sec  CARDIAC MARKERS ( 22 Dec 2023 12:05 )  x     / x     / 123 U/L / x     / x          Urinalysis Basic - ( 23 Dec 2023 06:59 )    Color: x / Appearance: x / SG: x / pH: x  Gluc: 121 mg/dL / Ketone: x  / Bili: x / Urobili: x   Blood: x / Protein: x / Nitrite: x   Leuk Esterase: x / RBC: x / WBC x   Sq Epi: x / Non Sq Epi: x / Bacteria: x        RADIOLOGY & ADDITIONAL TESTS:  Results Reviewed:   Imaging Personally Reviewed:  Electrocardiogram Personally Reviewed:    COORDINATION OF CARE:  Care Discussed with Consultants/Other Providers [Y/N]: Y  Prior or Outpatient Records Reviewed [Y/N]: Y   [facility] Division of Hospital Medicine  Chante Douglas MD  Pager (M-F, 8A-5P): [phone]  Other Times:  00017    Patient is a 68y old  Male who presents with a chief complaint of Fall (22 Dec 2023 23:30)      SUBJECTIVE / OVERNIGHT EVENTS:  No acute events overnight.  Pt with RLE with pain laterally.      MEDICATIONS  (STANDING):  amLODIPine   Tablet 10 milliGRAM(s) Oral daily  atorvastatin 80 milliGRAM(s) Oral at bedtime  enoxaparin Injectable 40 milliGRAM(s) SubCutaneous every 24 hours  lidocaine   4% Patch 1 Patch Transdermal daily  tamsulosin 0.4 milliGRAM(s) Oral at bedtime    MEDICATIONS  (PRN):  acetaminophen     Tablet .. 650 milliGRAM(s) Oral every 6 hours PRN Temp greater or equal to 38C (100.4F), Mild Pain (1 - 3)  HYDROmorphone  Injectable 0.5 milliGRAM(s) IV Push every 6 hours PRN Severe Pain (7 - 10)  oxyCODONE    IR 5 milliGRAM(s) Oral every 6 hours PRN Moderate Pain (4 - 6)  polyethylene glycol 3350 17 Gram(s) Oral daily PRN Constipation      CAPILLARY BLOOD GLUCOSE        I&O's Summary      PHYSICAL EXAM:  Vital Signs Last 24 Hrs  T(C): 37 (23 Dec 2023 13:40), Max: 37 (23 Dec 2023 13:40)  T(F): 98.6 (23 Dec 2023 13:40), Max: 98.6 (23 Dec 2023 13:40)  HR: 82 (23 Dec 2023 13:40) (64 - 96)  BP: 141/79 (23 Dec 2023 13:40) (122/73 - 153/70)  BP(mean): --  RR: 18 (23 Dec 2023 13:40) (18 - 20)  SpO2: 100% (23 Dec 2023 13:40) (95% - 100%)    Parameters below as of 23 Dec 2023 13:40  Patient On (Oxygen Delivery Method): room air        CONSTITUTIONAL: NAD, well-developed, well-groomed  EYES: EOMI; conjunctiva and sclera clear  ENMT: Moist oral mucosa  RESPIRATORY: Normal respiratory effort; lungs are clear to auscultation bilaterally  CARDIOVASCULAR: Regular rate and rhythm, normal S1 and S2, no murmur; No lower extremity edema  ABDOMEN: Nontender to palpation, normoactive bowel sounds, no rebound/guarding  MUSCULOSKELETAL:   RLE lateral thigh with tenderness  PSYCH: A+O to person, place, and time; affect appropriate  NEUROLOGY: CN 2-12 are intact and symmetric; no gross sensory deficits   SKIN: No rashes; no palpable lesions    LABS:                        14.7   6.45  )-----------( 154      ( 23 Dec 2023 06:59 )             44.4     12-23    136  |  102  |  11  ----------------------------<  121<H>  4.3   |  23  |  0.74    Ca    8.3<L>      23 Dec 2023 06:59  Phos  2.9     12-23  Mg     1.80     12-23    TPro  6.0  /  Alb  4.2  /  TBili  0.4  /  DBili  x   /  AST  21  /  ALT  29  /  AlkPhos  92  12-22    PT/INR - ( 22 Dec 2023 12:05 )   PT: 10.9 sec;   INR: 0.97 ratio         PTT - ( 22 Dec 2023 12:05 )  PTT:28.7 sec  CARDIAC MARKERS ( 22 Dec 2023 12:05 )  x     / x     / 123 U/L / x     / x          Urinalysis Basic - ( 23 Dec 2023 06:59 )    Color: x / Appearance: x / SG: x / pH: x  Gluc: 121 mg/dL / Ketone: x  / Bili: x / Urobili: x   Blood: x / Protein: x / Nitrite: x   Leuk Esterase: x / RBC: x / WBC x   Sq Epi: x / Non Sq Epi: x / Bacteria: x        RADIOLOGY & ADDITIONAL TESTS:  Results Reviewed:   Imaging Personally Reviewed:  Electrocardiogram Personally Reviewed:    COORDINATION OF CARE:  Care Discussed with Consultants/Other Providers [Y/N]: Y  Prior or Outpatient Records Reviewed [Y/N]: Y

## 2023-12-23 NOTE — PATIENT PROFILE ADULT - DOES PATIENT HAVE ADVANCE DIRECTIVE
will discuss with my brother- Barrie-[phone]/No will discuss with my brother- Barrie-[phone]/No

## 2023-12-23 NOTE — PATIENT PROFILE ADULT - FALL HARM RISK - UNIVERSAL INTERVENTIONS
Bed in lowest position, wheels locked, appropriate side rails in place/Call bell, personal items and telephone in reach/Instruct patient to call for assistance before getting out of bed or chair/Non-slip footwear when patient is out of bed/[place] to call system/Physically safe environment - no spills, clutter or unnecessary equipment/Purposeful Proactive Rounding/Room/bathroom lighting operational, light cord in reach Bed in lowest position, wheels locked, appropriate side rails in place/Call bell, personal items and telephone in reach/Instruct patient to call for assistance before getting out of bed or chair/Non-slip footwear when patient is out of bed/[place] to call system/Physically safe environment - no spills, clutter or unnecessary equipment/Purposeful Proactive Rounding/Room/bathroom lighting operational, light cord in reach

## 2023-12-23 NOTE — PHYSICAL THERAPY INITIAL EVALUATION ADULT - ADDITIONAL COMMENTS
Pt lives alone in an apartment on the 1st floor with 1 step to enter. Pt independent with all ADLs and ambulated without an assistive device at baseline.     Pt left semisupine in bed in NAD, all lines intact, call dominguez in reach, and RN Kevin aware.

## 2023-12-23 NOTE — PHYSICAL THERAPY INITIAL EVALUATION ADULT - GENERAL OBSERVATIONS, REHAB EVAL
Pt encountered in semi-supine position in NAD, all lines intact, a&ox4, BP: 144/79 mmHg, and RN Kevin aware.

## 2023-12-23 NOTE — OCCUPATIONAL THERAPY INITIAL EVALUATION ADULT - PERTINENT HX OF CURRENT PROBLEM, REHAB EVAL
68 year old male with history of HTN, HLD, DVT, history of lumbar laminectomy in April 2023 presenting after a fall at work, fell on his right side. Xray of right LE showed no fractures, CT revealing right thigh hematoma. Admitted for management of thigh hematoma and fall.

## 2023-12-23 NOTE — PHYSICAL THERAPY INITIAL EVALUATION ADULT - PERTINENT HX OF CURRENT PROBLEM, REHAB EVAL
Patient is a 68 year old male, PMH stated below, presents after a fall at work after not noticing oil on the floor; pt fell on his right side and complains of low back pain and right upper leg pain; admitted for management of thigh hematoma and fall.

## 2023-12-23 NOTE — OCCUPATIONAL THERAPY INITIAL EVALUATION ADULT - GENERAL OBSERVATIONS, REHAB EVAL
Patient received semisupine in bed in NAD; agreeable to participate in OT evaluation. BP: 144/79 mmHg.

## 2023-12-23 NOTE — PROGRESS NOTE ADULT - PROBLEM SELECTOR PLAN 1
- RLE anterior thigh hematoma s/p fall   - CT R hip showing hematoma of RLE   - ortho evaluated pt for compartment syndrome, no evidence at this time. pulses intact/sensations intact   - pain control with tylenol and oxycodone prn  - lidocaine path as needed   - serial exams for compartment syndrome   - PT/OT consult

## 2023-12-23 NOTE — PHYSICAL THERAPY INITIAL EVALUATION ADULT - NSPTDISCHREC_GEN_A_CORE
Anticipate Restorative rehab to improve strength, balance, ROM, and return to previous level of functional mobility.

## 2023-12-24 LAB
ALBUMIN SERPL ELPH-MCNC: 3.9 G/DL — SIGNIFICANT CHANGE UP (ref 3.3–5)
ALBUMIN SERPL ELPH-MCNC: 3.9 G/DL — SIGNIFICANT CHANGE UP (ref 3.3–5)
ALP SERPL-CCNC: 96 U/L — SIGNIFICANT CHANGE UP (ref 40–120)
ALP SERPL-CCNC: 96 U/L — SIGNIFICANT CHANGE UP (ref 40–120)
ALT FLD-CCNC: 28 U/L — SIGNIFICANT CHANGE UP (ref 4–41)
ALT FLD-CCNC: 28 U/L — SIGNIFICANT CHANGE UP (ref 4–41)
ANION GAP SERPL CALC-SCNC: 12 MMOL/L — SIGNIFICANT CHANGE UP (ref 7–14)
ANION GAP SERPL CALC-SCNC: 12 MMOL/L — SIGNIFICANT CHANGE UP (ref 7–14)
AST SERPL-CCNC: 27 U/L — SIGNIFICANT CHANGE UP (ref 4–40)
AST SERPL-CCNC: 27 U/L — SIGNIFICANT CHANGE UP (ref 4–40)
BASOPHILS # BLD AUTO: 0.01 K/UL — SIGNIFICANT CHANGE UP (ref 0–0.2)
BASOPHILS # BLD AUTO: 0.01 K/UL — SIGNIFICANT CHANGE UP (ref 0–0.2)
BASOPHILS NFR BLD AUTO: 0.2 % — SIGNIFICANT CHANGE UP (ref 0–2)
BASOPHILS NFR BLD AUTO: 0.2 % — SIGNIFICANT CHANGE UP (ref 0–2)
BILIRUB SERPL-MCNC: 0.7 MG/DL — SIGNIFICANT CHANGE UP (ref 0.2–1.2)
BILIRUB SERPL-MCNC: 0.7 MG/DL — SIGNIFICANT CHANGE UP (ref 0.2–1.2)
BUN SERPL-MCNC: 13 MG/DL — SIGNIFICANT CHANGE UP (ref 7–23)
BUN SERPL-MCNC: 13 MG/DL — SIGNIFICANT CHANGE UP (ref 7–23)
CALCIUM SERPL-MCNC: 8.4 MG/DL — SIGNIFICANT CHANGE UP (ref 8.4–10.5)
CALCIUM SERPL-MCNC: 8.4 MG/DL — SIGNIFICANT CHANGE UP (ref 8.4–10.5)
CHLORIDE SERPL-SCNC: 102 MMOL/L — SIGNIFICANT CHANGE UP (ref 98–107)
CHLORIDE SERPL-SCNC: 102 MMOL/L — SIGNIFICANT CHANGE UP (ref 98–107)
CO2 SERPL-SCNC: 25 MMOL/L — SIGNIFICANT CHANGE UP (ref 22–31)
CO2 SERPL-SCNC: 25 MMOL/L — SIGNIFICANT CHANGE UP (ref 22–31)
CREAT SERPL-MCNC: 0.82 MG/DL — SIGNIFICANT CHANGE UP (ref 0.5–1.3)
CREAT SERPL-MCNC: 0.82 MG/DL — SIGNIFICANT CHANGE UP (ref 0.5–1.3)
EGFR: 94 ML/MIN/1.73M2 — SIGNIFICANT CHANGE UP
EGFR: 94 ML/MIN/1.73M2 — SIGNIFICANT CHANGE UP
EOSINOPHIL # BLD AUTO: 0.17 K/UL — SIGNIFICANT CHANGE UP (ref 0–0.5)
EOSINOPHIL # BLD AUTO: 0.17 K/UL — SIGNIFICANT CHANGE UP (ref 0–0.5)
EOSINOPHIL NFR BLD AUTO: 3 % — SIGNIFICANT CHANGE UP (ref 0–6)
EOSINOPHIL NFR BLD AUTO: 3 % — SIGNIFICANT CHANGE UP (ref 0–6)
GLUCOSE SERPL-MCNC: 129 MG/DL — HIGH (ref 70–99)
GLUCOSE SERPL-MCNC: 129 MG/DL — HIGH (ref 70–99)
HCT VFR BLD CALC: 44.1 % — SIGNIFICANT CHANGE UP (ref 39–50)
HCT VFR BLD CALC: 44.1 % — SIGNIFICANT CHANGE UP (ref 39–50)
HGB BLD-MCNC: 14.8 G/DL — SIGNIFICANT CHANGE UP (ref 13–17)
HGB BLD-MCNC: 14.8 G/DL — SIGNIFICANT CHANGE UP (ref 13–17)
IANC: 3.51 K/UL — SIGNIFICANT CHANGE UP (ref 1.8–7.4)
IANC: 3.51 K/UL — SIGNIFICANT CHANGE UP (ref 1.8–7.4)
IMM GRANULOCYTES NFR BLD AUTO: 0.5 % — SIGNIFICANT CHANGE UP (ref 0–0.9)
IMM GRANULOCYTES NFR BLD AUTO: 0.5 % — SIGNIFICANT CHANGE UP (ref 0–0.9)
LYMPHOCYTES # BLD AUTO: 1.44 K/UL — SIGNIFICANT CHANGE UP (ref 1–3.3)
LYMPHOCYTES # BLD AUTO: 1.44 K/UL — SIGNIFICANT CHANGE UP (ref 1–3.3)
LYMPHOCYTES # BLD AUTO: 25 % — SIGNIFICANT CHANGE UP (ref 13–44)
LYMPHOCYTES # BLD AUTO: 25 % — SIGNIFICANT CHANGE UP (ref 13–44)
MAGNESIUM SERPL-MCNC: 2 MG/DL — SIGNIFICANT CHANGE UP (ref 1.6–2.6)
MAGNESIUM SERPL-MCNC: 2 MG/DL — SIGNIFICANT CHANGE UP (ref 1.6–2.6)
MCHC RBC-ENTMCNC: 30.5 PG — SIGNIFICANT CHANGE UP (ref 27–34)
MCHC RBC-ENTMCNC: 30.5 PG — SIGNIFICANT CHANGE UP (ref 27–34)
MCHC RBC-ENTMCNC: 33.6 GM/DL — SIGNIFICANT CHANGE UP (ref 32–36)
MCHC RBC-ENTMCNC: 33.6 GM/DL — SIGNIFICANT CHANGE UP (ref 32–36)
MCV RBC AUTO: 90.7 FL — SIGNIFICANT CHANGE UP (ref 80–100)
MCV RBC AUTO: 90.7 FL — SIGNIFICANT CHANGE UP (ref 80–100)
MONOCYTES # BLD AUTO: 0.59 K/UL — SIGNIFICANT CHANGE UP (ref 0–0.9)
MONOCYTES # BLD AUTO: 0.59 K/UL — SIGNIFICANT CHANGE UP (ref 0–0.9)
MONOCYTES NFR BLD AUTO: 10.3 % — SIGNIFICANT CHANGE UP (ref 2–14)
MONOCYTES NFR BLD AUTO: 10.3 % — SIGNIFICANT CHANGE UP (ref 2–14)
NEUTROPHILS # BLD AUTO: 3.51 K/UL — SIGNIFICANT CHANGE UP (ref 1.8–7.4)
NEUTROPHILS # BLD AUTO: 3.51 K/UL — SIGNIFICANT CHANGE UP (ref 1.8–7.4)
NEUTROPHILS NFR BLD AUTO: 61 % — SIGNIFICANT CHANGE UP (ref 43–77)
NEUTROPHILS NFR BLD AUTO: 61 % — SIGNIFICANT CHANGE UP (ref 43–77)
NRBC # BLD: 0 /100 WBCS — SIGNIFICANT CHANGE UP (ref 0–0)
NRBC # BLD: 0 /100 WBCS — SIGNIFICANT CHANGE UP (ref 0–0)
NRBC # FLD: 0 K/UL — SIGNIFICANT CHANGE UP (ref 0–0)
NRBC # FLD: 0 K/UL — SIGNIFICANT CHANGE UP (ref 0–0)
PHOSPHATE SERPL-MCNC: 4.1 MG/DL — SIGNIFICANT CHANGE UP (ref 2.5–4.5)
PHOSPHATE SERPL-MCNC: 4.1 MG/DL — SIGNIFICANT CHANGE UP (ref 2.5–4.5)
PLATELET # BLD AUTO: 160 K/UL — SIGNIFICANT CHANGE UP (ref 150–400)
PLATELET # BLD AUTO: 160 K/UL — SIGNIFICANT CHANGE UP (ref 150–400)
POTASSIUM SERPL-MCNC: 3.9 MMOL/L — SIGNIFICANT CHANGE UP (ref 3.5–5.3)
POTASSIUM SERPL-MCNC: 3.9 MMOL/L — SIGNIFICANT CHANGE UP (ref 3.5–5.3)
POTASSIUM SERPL-SCNC: 3.9 MMOL/L — SIGNIFICANT CHANGE UP (ref 3.5–5.3)
POTASSIUM SERPL-SCNC: 3.9 MMOL/L — SIGNIFICANT CHANGE UP (ref 3.5–5.3)
PROT SERPL-MCNC: 6.4 G/DL — SIGNIFICANT CHANGE UP (ref 6–8.3)
PROT SERPL-MCNC: 6.4 G/DL — SIGNIFICANT CHANGE UP (ref 6–8.3)
RBC # BLD: 4.86 M/UL — SIGNIFICANT CHANGE UP (ref 4.2–5.8)
RBC # BLD: 4.86 M/UL — SIGNIFICANT CHANGE UP (ref 4.2–5.8)
RBC # FLD: 13.2 % — SIGNIFICANT CHANGE UP (ref 10.3–14.5)
RBC # FLD: 13.2 % — SIGNIFICANT CHANGE UP (ref 10.3–14.5)
SODIUM SERPL-SCNC: 139 MMOL/L — SIGNIFICANT CHANGE UP (ref 135–145)
SODIUM SERPL-SCNC: 139 MMOL/L — SIGNIFICANT CHANGE UP (ref 135–145)
WBC # BLD: 5.75 K/UL — SIGNIFICANT CHANGE UP (ref 3.8–10.5)
WBC # BLD: 5.75 K/UL — SIGNIFICANT CHANGE UP (ref 3.8–10.5)
WBC # FLD AUTO: 5.75 K/UL — SIGNIFICANT CHANGE UP (ref 3.8–10.5)
WBC # FLD AUTO: 5.75 K/UL — SIGNIFICANT CHANGE UP (ref 3.8–10.5)

## 2023-12-24 PROCEDURE — 99232 SBSQ HOSP IP/OBS MODERATE 35: CPT

## 2023-12-24 RX ORDER — ZALEPLON 10 MG
5 CAPSULE ORAL AT BEDTIME
Refills: 0 | Status: DISCONTINUED | OUTPATIENT
Start: 2023-12-24 | End: 2023-12-28

## 2023-12-24 RX ADMIN — Medication 975 MILLIGRAM(S): at 16:32

## 2023-12-24 RX ADMIN — Medication 975 MILLIGRAM(S): at 22:59

## 2023-12-24 RX ADMIN — Medication 975 MILLIGRAM(S): at 05:13

## 2023-12-24 RX ADMIN — Medication 975 MILLIGRAM(S): at 00:14

## 2023-12-24 RX ADMIN — Medication 975 MILLIGRAM(S): at 17:32

## 2023-12-24 RX ADMIN — Medication 5 MILLIGRAM(S): at 23:53

## 2023-12-24 RX ADMIN — Medication 975 MILLIGRAM(S): at 12:14

## 2023-12-24 RX ADMIN — ENOXAPARIN SODIUM 40 MILLIGRAM(S): 100 INJECTION SUBCUTANEOUS at 18:36

## 2023-12-24 RX ADMIN — Medication 975 MILLIGRAM(S): at 23:58

## 2023-12-24 RX ADMIN — Medication 975 MILLIGRAM(S): at 11:14

## 2023-12-24 NOTE — CHART NOTE - NSCHARTNOTEFT_GEN_A_CORE
[employer] [facility].    Notified by RN, pt requesting Ambien for sleep and reports Melatonin does not work for him. iSTOP performed and reviewed (Reference #: [id number]), with no active prescriptions in [place] or [place]. Pt reports getting prescription from his heart doctor, however per RN, pt due for analgesic and appears comfortable now. Will order Sonata for PRN Insomnia. Will continue to monitor overnight.         Penny Estrada PA  Medicine [id number] [employer] [facility] [employer].    Notified by RN, pt requesting Ambien for sleep and reports Melatonin does not work for him. iSTOP performed and reviewed (Reference #: [id number]), with no active prescriptions in [place] or [place]. Pt reports getting prescription from his heart doctor, however per RN, pt due for analgesic and appears comfortable now. Will order Sonata for PRN Insomnia. Will continue to monitor overnight.         Penny Estrada PA  Medicine [id number] [facility] [facility] [employer].    Notified by RN, pt requesting Ambien for sleep and reports Melatonin does not work for him. iSTOP performed and reviewed (Reference #: [id number]), with no active prescriptions in [place] or [place]. Pt reports getting prescription from his heart doctor in [place], however can't recall when he last filled this prescription. Per RN, pt due for analgesic and appears comfortable now. Will order Sonata for PRN Insomnia. Can f/u with daughter in the morning. Will continue to monitor overnight.     Penny Estrada PA  Medicine [id number] [facility] [facility] [employer].    Notified by RN, pt requesting Ambien for sleep and reports Melatonin does not work for him. iSTOP performed and reviewed (Reference #: [id number]), with no active prescriptions in [place] or [place]. Pt reports getting prescription from his heart doctor in [place], however can't recall when he last filled this prescription. Per RN, pt due for analgesic and appears comfortable now. Will order Sonata for PRN Insomnia. Can f/u with daughter in the morning. Will continue to monitor overnight.     Penny Estrada PA  Medicine [id number]

## 2023-12-24 NOTE — PROGRESS NOTE ADULT - ASSESSMENT
69yo male with pmh HTN, HLD, prev DVT, history of lumbar laminectomy in April 2023, bariatric surgery 15 years ago presenting after a fall at work, fell on his right side. Patient denied any dizziness prior to fall, denied any head trauma, no LOC, reports pain at R anterior thigh. Xray of RLE showed no fractures, CT R hip hematoma. VSS, labs were unremarkable. Admitted for management of thigh hematoma and fall.  67yo male with pmh HTN, HLD, prev DVT, history of lumbar laminectomy in April 2023, bariatric surgery 15 years ago presenting after a fall at work, fell on his right side. Patient denied any dizziness prior to fall, denied any head trauma, no LOC, reports pain at R anterior thigh. Xray of RLE showed no fractures, CT R hip hematoma. VSS, labs were unremarkable. Admitted for management of thigh hematoma and fall.

## 2023-12-24 NOTE — PROGRESS NOTE ADULT - PROBLEM SELECTOR PLAN 1
- RLE anterior thigh hematoma s/p fall   - CT R hip showing hematoma of RLE   - surgery evaluated pt for compartment syndrome, no evidence at this time. pulses intact/sensations intact   - pain control with tylenol and oxycodone prn  - lidocaine patch as needed   - serial exams for compartment syndrome   - PT/OT consult

## 2023-12-24 NOTE — PROGRESS NOTE ADULT - ASSESSMENT
Assessment: 68 year old man with HTN, HLD, old dvt not on AC for 1 year with rle hematoma after fall, normal CK, pulses intact and motor and sensory intact, pain over hematoma.      Recs:  - no evidence of compartment syndrome at this time  - pain control  - PT  - serial exams      B Team Surgery   [id number] Assessment: 68 year old man with HTN, HLD, old dvt not on AC for 1 year with rle hematoma after fall, normal CK, pulses intact and motor and sensory intact, pain over hematoma.      Recs:  - no evidence of compartment syndrome at this time  - pain control  - PT  - serial exams      B Team Surgery   [id number] Assessment: 68 year old man with HTN, HLD, old dvt not on AC for 1 year with rle hematoma after fall, normal CK, pulses intact and motor and sensory intact, pain over hematoma. Exam improving, no evidence of compartment syndrome at this time.    Recs:  - Pain control  - PT  - Please re-page as needed    B Team Surgery   [id number] Assessment: 68 year old man with HTN, HLD, old dvt not on AC for 1 year with rle hematoma after fall, normal CK, pulses intact and motor and sensory intact, pain over hematoma. Exam improving, no evidence of compartment syndrome at this time.    Recs:  - Pain control  - PT  - Please re-page as needed    B Team Surgery   [id number]

## 2023-12-24 NOTE — PROGRESS NOTE ADULT - SUBJECTIVE AND OBJECTIVE BOX
[facility] Division of Hospital Medicine  Chante Douglas MD  Pager (M-F, 8A-5P): [phone]  Other Times:  [id number]    Patient is a 68y old  Male who presents with a chief complaint of Fall (24 Dec 2023 09:14)      SUBJECTIVE / OVERNIGHT EVENTS:  No acute events overnight.  continues to have pain at R lateral thigh.    MEDICATIONS  (STANDING):  acetaminophen     Tablet .. 975 milliGRAM(s) Oral every 6 hours  amLODIPine   Tablet 10 milliGRAM(s) Oral daily  atorvastatin 80 milliGRAM(s) Oral at bedtime  enoxaparin Injectable 40 milliGRAM(s) SubCutaneous every 24 hours  lidocaine   4% Patch 1 Patch Transdermal daily  tamsulosin 0.4 milliGRAM(s) Oral at bedtime    MEDICATIONS  (PRN):  HYDROmorphone  Injectable 0.5 milliGRAM(s) IV Push every 6 hours PRN Severe Pain (7 - 10)  oxyCODONE    IR 5 milliGRAM(s) Oral every 6 hours PRN Moderate Pain (4 - 6)  polyethylene glycol 3350 17 Gram(s) Oral daily PRN Constipation  zaleplon 5 milliGRAM(s) Oral at bedtime PRN Insomnia      CAPILLARY BLOOD GLUCOSE        I&O's Summary    23 Dec 2023 07:01  -  24 Dec 2023 07:00  --------------------------------------------------------  IN: 0 mL / OUT: 300 mL / NET: -300 mL        PHYSICAL EXAM:  Vital Signs Last 24 Hrs  T(C): 36.7 (24 Dec 2023 09:25), Max: 36.8 (23 Dec 2023 17:57)  T(F): 98.1 (24 Dec 2023 09:25), Max: 98.3 (24 Dec 2023 05:36)  HR: 90 (24 Dec 2023 09:25) (67 - 90)  BP: 128/68 (24 Dec 2023 09:25) (116/60 - 128/68)  BP(mean): --  RR: 17 (24 Dec 2023 09:25) (16 - 17)  SpO2: 95% (24 Dec 2023 09:25) (95% - 98%)    Parameters below as of 24 Dec 2023 09:25  Patient On (Oxygen Delivery Method): room air        CONSTITUTIONAL: NAD, well-developed, well-groomed  EYES: EOMI; conjunctiva and sclera clear  ENMT: Moist oral mucosa  RESPIRATORY: Normal respiratory effort; lungs are clear to auscultation bilaterally  CARDIOVASCULAR: Regular rate and rhythm, normal S1 and S2, no murmur; No lower extremity edema  ABDOMEN: Nontender to palpation, normoactive bowel sounds, no rebound/guarding  MUSCULOSKELETAL:   no clubbing or cyanosis of digits; no joint swelling or tenderness to palpation  PSYCH: A+O to person, place, and time; affect appropriate  NEUROLOGY: CN 2-12 are intact and symmetric; no gross sensory deficits   SKIN: No rashes; no palpable lesions    LABS:                        14.8   5.75  )-----------( 160      ( 24 Dec 2023 05:22 )             44.1     12-24    139  |  102  |  13  ----------------------------<  129<H>  3.9   |  25  |  0.82    Ca    8.4      24 Dec 2023 05:22  Phos  4.1     12-24  Mg     2.00     12-24    TPro  6.4  /  Alb  3.9  /  TBili  0.7  /  DBili  x   /  AST  27  /  ALT  28  /  AlkPhos  96  12-24          Urinalysis Basic - ( 24 Dec 2023 05:22 )    Color: x / Appearance: x / SG: x / pH: x  Gluc: 129 mg/dL / Ketone: x  / Bili: x / Urobili: x   Blood: x / Protein: x / Nitrite: x   Leuk Esterase: x / RBC: x / WBC x   Sq Epi: x / Non Sq Epi: x / Bacteria: x        RADIOLOGY & ADDITIONAL TESTS:  Results Reviewed:   Imaging Personally Reviewed:  Electrocardiogram Personally Reviewed:    COORDINATION OF CARE:  Care Discussed with Consultants/Other Providers [Y/N]: Y  Prior or Outpatient Records Reviewed [Y/N]: Y   [facility] of [facility] Medicine  Chante Douglas MD  Pager (M-F, 8A-5P): [phone]  Other Times:  [id number]    Patient is a 68y old  Male who presents with a chief complaint of Fall (24 Dec 2023 09:14)      SUBJECTIVE / OVERNIGHT EVENTS:  No acute events overnight.  continues to have pain at R lateral thigh.    MEDICATIONS  (STANDING):  acetaminophen     Tablet .. 975 milliGRAM(s) Oral every 6 hours  amLODIPine   Tablet 10 milliGRAM(s) Oral daily  atorvastatin 80 milliGRAM(s) Oral at bedtime  enoxaparin Injectable 40 milliGRAM(s) SubCutaneous every 24 hours  lidocaine   4% Patch 1 Patch Transdermal daily  tamsulosin 0.4 milliGRAM(s) Oral at bedtime    MEDICATIONS  (PRN):  HYDROmorphone  Injectable 0.5 milliGRAM(s) IV Push every 6 hours PRN Severe Pain (7 - 10)  oxyCODONE    IR 5 milliGRAM(s) Oral every 6 hours PRN Moderate Pain (4 - 6)  polyethylene glycol 3350 17 Gram(s) Oral daily PRN Constipation  zaleplon 5 milliGRAM(s) Oral at bedtime PRN Insomnia      CAPILLARY BLOOD GLUCOSE        I&O's Summary    23 Dec 2023 07:01  -  24 Dec 2023 07:00  --------------------------------------------------------  IN: 0 mL / OUT: 300 mL / NET: -300 mL        PHYSICAL EXAM:  Vital Signs Last 24 Hrs  T(C): 36.7 (24 Dec 2023 09:25), Max: 36.8 (23 Dec 2023 17:57)  T(F): 98.1 (24 Dec 2023 09:25), Max: 98.3 (24 Dec 2023 05:36)  HR: 90 (24 Dec 2023 09:25) (67 - 90)  BP: 128/68 (24 Dec 2023 09:25) (116/60 - 128/68)  BP(mean): --  RR: 17 (24 Dec 2023 09:25) (16 - 17)  SpO2: 95% (24 Dec 2023 09:25) (95% - 98%)    Parameters below as of 24 Dec 2023 09:25  Patient On (Oxygen Delivery Method): room air        CONSTITUTIONAL: NAD, well-developed, well-groomed  EYES: EOMI; conjunctiva and sclera clear  ENMT: Moist oral mucosa  RESPIRATORY: Normal respiratory effort; lungs are clear to auscultation bilaterally  CARDIOVASCULAR: Regular rate and rhythm, normal S1 and S2, no murmur; No lower extremity edema  ABDOMEN: Nontender to palpation, normoactive bowel sounds, no rebound/guarding  MUSCULOSKELETAL:   no clubbing or cyanosis of digits; no joint swelling or tenderness to palpation  PSYCH: A+O to person, place, and time; affect appropriate  NEUROLOGY: CN 2-12 are intact and symmetric; no gross sensory deficits   SKIN: No rashes; no palpable lesions    LABS:                        14.8   5.75  )-----------( 160      ( 24 Dec 2023 05:22 )             44.1     12-24    139  |  102  |  13  ----------------------------<  129<H>  3.9   |  25  |  0.82    Ca    8.4      24 Dec 2023 05:22  Phos  4.1     12-24  Mg     2.00     12-24    TPro  6.4  /  Alb  3.9  /  TBili  0.7  /  DBili  x   /  AST  27  /  ALT  28  /  AlkPhos  96  12-24          Urinalysis Basic - ( 24 Dec 2023 05:22 )    Color: x / Appearance: x / SG: x / pH: x  Gluc: 129 mg/dL / Ketone: x  / Bili: x / Urobili: x   Blood: x / Protein: x / Nitrite: x   Leuk Esterase: x / RBC: x / WBC x   Sq Epi: x / Non Sq Epi: x / Bacteria: x        RADIOLOGY & ADDITIONAL TESTS:  Results Reviewed:   Imaging Personally Reviewed:  Electrocardiogram Personally Reviewed:    COORDINATION OF CARE:  Care Discussed with Consultants/Other Providers [Y/N]: Y  Prior or Outpatient Records Reviewed [Y/N]: Y

## 2023-12-24 NOTE — PROGRESS NOTE ADULT - SUBJECTIVE AND OBJECTIVE BOX
SURGERY PROGRESS NOTE    SUBJECTIVE / 24H EVENTS:  Patient seen and examined on morning rounds. No acute events overnight. Patient reports leg pain improving      OBJECTIVE:  VITAL SIGNS:  T(C): 36.8 (12-24-23 @ 05:36), Max: 37 (12-23-23 @ 13:40)  HR: 83 (12-24-23 @ 05:36) (67 - 83)  BP: 125/78 (12-24-23 @ 05:36) (116/60 - 141/79)  RR: 17 (12-24-23 @ 05:36) (16 - 18)  SpO2: 98% (12-24-23 @ 05:36) (95% - 100%)  Daily     Daily     PHYSICAL EXAM:  Gen: NAD  LS: Respirations unlabored on RA  Extremitiesl: RLE thigh anterior compartment firm, tender, not tense, posterior compartment soft and nontender, sensate, no skin changes over hematoma, moves toes but with pain limited flexion and extension at ankle, no sensory deficits at this time           12-23-23 @ 07:01  -  12-24-23 @ 07:00  --------------------------------------------------------  IN:  Total IN: 0 mL    OUT:    Voided (mL): 300 mL  Total OUT: 300 mL    Total NET: -300 mL          LAB VALUES:  12-24    139  |  102  |  13  ----------------------------<  129<H>  3.9   |  25  |  0.82    Ca    8.4      24 Dec 2023 05:22  Phos  4.1     12-24  Mg     2.00     12-24    TPro  6.4  /  Alb  3.9  /  TBili  0.7  /  DBili  x   /  AST  27  /  ALT  28  /  AlkPhos  96  12-24                               14.8   5.75  )-----------( 160      ( 24 Dec 2023 05:22 )             44.1     LIVER FUNCTIONS - ( 24 Dec 2023 05:22 )  Alb: 3.9 g/dL / Pro: 6.4 g/dL / ALK PHOS: 96 U/L / ALT: 28 U/L / AST: 27 U/L / GGT: x           PT/INR - ( 22 Dec 2023 12:05 )   PT: 10.9 sec;   INR: 0.97 ratio         PTT - ( 22 Dec 2023 12:05 )  PTT:28.7 sec    CARDIAC MARKERS ( 22 Dec 2023 12:05 )  x     / x     / 123 U/L / x     / x          Urinalysis Basic - ( 24 Dec 2023 05:22 )    Color: x / Appearance: x / SG: x / pH: x  Gluc: 129 mg/dL / Ketone: x  / Bili: x / Urobili: x   Blood: x / Protein: x / Nitrite: x   Leuk Esterase: x / RBC: x / WBC x   Sq Epi: x / Non Sq Epi: x / Bacteria: x        MICROBIOLOGY:      RADIOLOGY:        MEDICATIONS  (STANDING):  acetaminophen     Tablet .. 975 milliGRAM(s) Oral every 6 hours  amLODIPine   Tablet 10 milliGRAM(s) Oral daily  atorvastatin 80 milliGRAM(s) Oral at bedtime  enoxaparin Injectable 40 milliGRAM(s) SubCutaneous every 24 hours  lidocaine   4% Patch 1 Patch Transdermal daily  tamsulosin 0.4 milliGRAM(s) Oral at bedtime    MEDICATIONS  (PRN):  HYDROmorphone  Injectable 0.5 milliGRAM(s) IV Push every 6 hours PRN Severe Pain (7 - 10)  oxyCODONE    IR 5 milliGRAM(s) Oral every 6 hours PRN Moderate Pain (4 - 6)  polyethylene glycol 3350 17 Gram(s) Oral daily PRN Constipation  zaleplon 5 milliGRAM(s) Oral at bedtime PRN Insomnia        SURGERY PROGRESS NOTE    SUBJECTIVE / 24H EVENTS:  Patient seen and examined on morning rounds. No acute events overnight. Patient reports leg pain improving      OBJECTIVE:  VITAL SIGNS:  T(C): 36.8 (12-24-23 @ 05:36), Max: 37 (12-23-23 @ 13:40)  HR: 83 (12-24-23 @ 05:36) (67 - 83)  BP: 125/78 (12-24-23 @ 05:36) (116/60 - 141/79)  RR: 17 (12-24-23 @ 05:36) (16 - 18)  SpO2: 98% (12-24-23 @ 05:36) (95% - 100%)  Daily     Daily     PHYSICAL EXAM:  Gen: NAD  LS: Respirations unlabored on RA  Extremities: RLE thigh anterior compartment firm, tender. medial and posterior compartments soft and nontender. no skin changes over hematoma, improved flexion at knee and ankle with less limitation 2/2 pain. no sensory deficits. Palpable R DP pulse          12-23-23 @ 07:01  -  12-24-23 @ 07:00  --------------------------------------------------------  IN:  Total IN: 0 mL    OUT:    Voided (mL): 300 mL  Total OUT: 300 mL    Total NET: -300 mL          LAB VALUES:  12-24    139  |  102  |  13  ----------------------------<  129<H>  3.9   |  25  |  0.82    Ca    8.4      24 Dec 2023 05:22  Phos  4.1     12-24  Mg     2.00     12-24    TPro  6.4  /  Alb  3.9  /  TBili  0.7  /  DBili  x   /  AST  27  /  ALT  28  /  AlkPhos  96  12-24                               14.8   5.75  )-----------( 160      ( 24 Dec 2023 05:22 )             44.1     LIVER FUNCTIONS - ( 24 Dec 2023 05:22 )  Alb: 3.9 g/dL / Pro: 6.4 g/dL / ALK PHOS: 96 U/L / ALT: 28 U/L / AST: 27 U/L / GGT: x           PT/INR - ( 22 Dec 2023 12:05 )   PT: 10.9 sec;   INR: 0.97 ratio         PTT - ( 22 Dec 2023 12:05 )  PTT:28.7 sec    CARDIAC MARKERS ( 22 Dec 2023 12:05 )  x     / x     / 123 U/L / x     / x          Urinalysis Basic - ( 24 Dec 2023 05:22 )    Color: x / Appearance: x / SG: x / pH: x  Gluc: 129 mg/dL / Ketone: x  / Bili: x / Urobili: x   Blood: x / Protein: x / Nitrite: x   Leuk Esterase: x / RBC: x / WBC x   Sq Epi: x / Non Sq Epi: x / Bacteria: x        MEDICATIONS  (STANDING):  acetaminophen     Tablet .. 975 milliGRAM(s) Oral every 6 hours  amLODIPine   Tablet 10 milliGRAM(s) Oral daily  atorvastatin 80 milliGRAM(s) Oral at bedtime  enoxaparin Injectable 40 milliGRAM(s) SubCutaneous every 24 hours  lidocaine   4% Patch 1 Patch Transdermal daily  tamsulosin 0.4 milliGRAM(s) Oral at bedtime    MEDICATIONS  (PRN):  HYDROmorphone  Injectable 0.5 milliGRAM(s) IV Push every 6 hours PRN Severe Pain (7 - 10)  oxyCODONE    IR 5 milliGRAM(s) Oral every 6 hours PRN Moderate Pain (4 - 6)  polyethylene glycol 3350 17 Gram(s) Oral daily PRN Constipation  zaleplon 5 milliGRAM(s) Oral at bedtime PRN Insomnia

## 2023-12-25 LAB
ALBUMIN SERPL ELPH-MCNC: 3.9 G/DL — SIGNIFICANT CHANGE UP (ref 3.3–5)
ALBUMIN SERPL ELPH-MCNC: 3.9 G/DL — SIGNIFICANT CHANGE UP (ref 3.3–5)
ALP SERPL-CCNC: 90 U/L — SIGNIFICANT CHANGE UP (ref 40–120)
ALP SERPL-CCNC: 90 U/L — SIGNIFICANT CHANGE UP (ref 40–120)
ALT FLD-CCNC: 26 U/L — SIGNIFICANT CHANGE UP (ref 4–41)
ALT FLD-CCNC: 26 U/L — SIGNIFICANT CHANGE UP (ref 4–41)
ANION GAP SERPL CALC-SCNC: 11 MMOL/L — SIGNIFICANT CHANGE UP (ref 7–14)
ANION GAP SERPL CALC-SCNC: 11 MMOL/L — SIGNIFICANT CHANGE UP (ref 7–14)
AST SERPL-CCNC: 23 U/L — SIGNIFICANT CHANGE UP (ref 4–40)
AST SERPL-CCNC: 23 U/L — SIGNIFICANT CHANGE UP (ref 4–40)
BASOPHILS # BLD AUTO: 0.03 K/UL — SIGNIFICANT CHANGE UP (ref 0–0.2)
BASOPHILS # BLD AUTO: 0.03 K/UL — SIGNIFICANT CHANGE UP (ref 0–0.2)
BASOPHILS NFR BLD AUTO: 0.5 % — SIGNIFICANT CHANGE UP (ref 0–2)
BASOPHILS NFR BLD AUTO: 0.5 % — SIGNIFICANT CHANGE UP (ref 0–2)
BILIRUB SERPL-MCNC: 0.7 MG/DL — SIGNIFICANT CHANGE UP (ref 0.2–1.2)
BILIRUB SERPL-MCNC: 0.7 MG/DL — SIGNIFICANT CHANGE UP (ref 0.2–1.2)
BUN SERPL-MCNC: 11 MG/DL — SIGNIFICANT CHANGE UP (ref 7–23)
BUN SERPL-MCNC: 11 MG/DL — SIGNIFICANT CHANGE UP (ref 7–23)
CALCIUM SERPL-MCNC: 8.6 MG/DL — SIGNIFICANT CHANGE UP (ref 8.4–10.5)
CALCIUM SERPL-MCNC: 8.6 MG/DL — SIGNIFICANT CHANGE UP (ref 8.4–10.5)
CHLORIDE SERPL-SCNC: 106 MMOL/L — SIGNIFICANT CHANGE UP (ref 98–107)
CHLORIDE SERPL-SCNC: 106 MMOL/L — SIGNIFICANT CHANGE UP (ref 98–107)
CO2 SERPL-SCNC: 23 MMOL/L — SIGNIFICANT CHANGE UP (ref 22–31)
CO2 SERPL-SCNC: 23 MMOL/L — SIGNIFICANT CHANGE UP (ref 22–31)
CREAT SERPL-MCNC: 0.8 MG/DL — SIGNIFICANT CHANGE UP (ref 0.5–1.3)
CREAT SERPL-MCNC: 0.8 MG/DL — SIGNIFICANT CHANGE UP (ref 0.5–1.3)
EGFR: 95 ML/MIN/1.73M2 — SIGNIFICANT CHANGE UP
EGFR: 95 ML/MIN/1.73M2 — SIGNIFICANT CHANGE UP
EOSINOPHIL # BLD AUTO: 0.19 K/UL — SIGNIFICANT CHANGE UP (ref 0–0.5)
EOSINOPHIL # BLD AUTO: 0.19 K/UL — SIGNIFICANT CHANGE UP (ref 0–0.5)
EOSINOPHIL NFR BLD AUTO: 3.1 % — SIGNIFICANT CHANGE UP (ref 0–6)
EOSINOPHIL NFR BLD AUTO: 3.1 % — SIGNIFICANT CHANGE UP (ref 0–6)
GLUCOSE SERPL-MCNC: 105 MG/DL — HIGH (ref 70–99)
GLUCOSE SERPL-MCNC: 105 MG/DL — HIGH (ref 70–99)
HCT VFR BLD CALC: 45.4 % — SIGNIFICANT CHANGE UP (ref 39–50)
HCT VFR BLD CALC: 45.4 % — SIGNIFICANT CHANGE UP (ref 39–50)
HGB BLD-MCNC: 15.2 G/DL — SIGNIFICANT CHANGE UP (ref 13–17)
HGB BLD-MCNC: 15.2 G/DL — SIGNIFICANT CHANGE UP (ref 13–17)
IANC: 3.39 K/UL — SIGNIFICANT CHANGE UP (ref 1.8–7.4)
IANC: 3.39 K/UL — SIGNIFICANT CHANGE UP (ref 1.8–7.4)
IMM GRANULOCYTES NFR BLD AUTO: 0.5 % — SIGNIFICANT CHANGE UP (ref 0–0.9)
IMM GRANULOCYTES NFR BLD AUTO: 0.5 % — SIGNIFICANT CHANGE UP (ref 0–0.9)
LYMPHOCYTES # BLD AUTO: 1.86 K/UL — SIGNIFICANT CHANGE UP (ref 1–3.3)
LYMPHOCYTES # BLD AUTO: 1.86 K/UL — SIGNIFICANT CHANGE UP (ref 1–3.3)
LYMPHOCYTES # BLD AUTO: 30.7 % — SIGNIFICANT CHANGE UP (ref 13–44)
LYMPHOCYTES # BLD AUTO: 30.7 % — SIGNIFICANT CHANGE UP (ref 13–44)
MAGNESIUM SERPL-MCNC: 2 MG/DL — SIGNIFICANT CHANGE UP (ref 1.6–2.6)
MAGNESIUM SERPL-MCNC: 2 MG/DL — SIGNIFICANT CHANGE UP (ref 1.6–2.6)
MCHC RBC-ENTMCNC: 30.3 PG — SIGNIFICANT CHANGE UP (ref 27–34)
MCHC RBC-ENTMCNC: 30.3 PG — SIGNIFICANT CHANGE UP (ref 27–34)
MCHC RBC-ENTMCNC: 33.5 GM/DL — SIGNIFICANT CHANGE UP (ref 32–36)
MCHC RBC-ENTMCNC: 33.5 GM/DL — SIGNIFICANT CHANGE UP (ref 32–36)
MCV RBC AUTO: 90.4 FL — SIGNIFICANT CHANGE UP (ref 80–100)
MCV RBC AUTO: 90.4 FL — SIGNIFICANT CHANGE UP (ref 80–100)
MONOCYTES # BLD AUTO: 0.56 K/UL — SIGNIFICANT CHANGE UP (ref 0–0.9)
MONOCYTES # BLD AUTO: 0.56 K/UL — SIGNIFICANT CHANGE UP (ref 0–0.9)
MONOCYTES NFR BLD AUTO: 9.2 % — SIGNIFICANT CHANGE UP (ref 2–14)
MONOCYTES NFR BLD AUTO: 9.2 % — SIGNIFICANT CHANGE UP (ref 2–14)
NEUTROPHILS # BLD AUTO: 3.39 K/UL — SIGNIFICANT CHANGE UP (ref 1.8–7.4)
NEUTROPHILS # BLD AUTO: 3.39 K/UL — SIGNIFICANT CHANGE UP (ref 1.8–7.4)
NEUTROPHILS NFR BLD AUTO: 56 % — SIGNIFICANT CHANGE UP (ref 43–77)
NEUTROPHILS NFR BLD AUTO: 56 % — SIGNIFICANT CHANGE UP (ref 43–77)
NRBC # BLD: 0 /100 WBCS — SIGNIFICANT CHANGE UP (ref 0–0)
NRBC # BLD: 0 /100 WBCS — SIGNIFICANT CHANGE UP (ref 0–0)
NRBC # FLD: 0 K/UL — SIGNIFICANT CHANGE UP (ref 0–0)
NRBC # FLD: 0 K/UL — SIGNIFICANT CHANGE UP (ref 0–0)
PHOSPHATE SERPL-MCNC: 3.9 MG/DL — SIGNIFICANT CHANGE UP (ref 2.5–4.5)
PHOSPHATE SERPL-MCNC: 3.9 MG/DL — SIGNIFICANT CHANGE UP (ref 2.5–4.5)
PLATELET # BLD AUTO: 175 K/UL — SIGNIFICANT CHANGE UP (ref 150–400)
PLATELET # BLD AUTO: 175 K/UL — SIGNIFICANT CHANGE UP (ref 150–400)
POTASSIUM SERPL-MCNC: 3.8 MMOL/L — SIGNIFICANT CHANGE UP (ref 3.5–5.3)
POTASSIUM SERPL-MCNC: 3.8 MMOL/L — SIGNIFICANT CHANGE UP (ref 3.5–5.3)
POTASSIUM SERPL-SCNC: 3.8 MMOL/L — SIGNIFICANT CHANGE UP (ref 3.5–5.3)
POTASSIUM SERPL-SCNC: 3.8 MMOL/L — SIGNIFICANT CHANGE UP (ref 3.5–5.3)
PROT SERPL-MCNC: 6.4 G/DL — SIGNIFICANT CHANGE UP (ref 6–8.3)
PROT SERPL-MCNC: 6.4 G/DL — SIGNIFICANT CHANGE UP (ref 6–8.3)
RBC # BLD: 5.02 M/UL — SIGNIFICANT CHANGE UP (ref 4.2–5.8)
RBC # BLD: 5.02 M/UL — SIGNIFICANT CHANGE UP (ref 4.2–5.8)
RBC # FLD: 13.2 % — SIGNIFICANT CHANGE UP (ref 10.3–14.5)
RBC # FLD: 13.2 % — SIGNIFICANT CHANGE UP (ref 10.3–14.5)
SODIUM SERPL-SCNC: 140 MMOL/L — SIGNIFICANT CHANGE UP (ref 135–145)
SODIUM SERPL-SCNC: 140 MMOL/L — SIGNIFICANT CHANGE UP (ref 135–145)
WBC # BLD: 6.06 K/UL — SIGNIFICANT CHANGE UP (ref 3.8–10.5)
WBC # BLD: 6.06 K/UL — SIGNIFICANT CHANGE UP (ref 3.8–10.5)
WBC # FLD AUTO: 6.06 K/UL — SIGNIFICANT CHANGE UP (ref 3.8–10.5)
WBC # FLD AUTO: 6.06 K/UL — SIGNIFICANT CHANGE UP (ref 3.8–10.5)

## 2023-12-25 PROCEDURE — 99232 SBSQ HOSP IP/OBS MODERATE 35: CPT

## 2023-12-25 RX ADMIN — Medication 975 MILLIGRAM(S): at 18:32

## 2023-12-25 RX ADMIN — Medication 975 MILLIGRAM(S): at 11:44

## 2023-12-25 RX ADMIN — Medication 975 MILLIGRAM(S): at 06:47

## 2023-12-25 RX ADMIN — Medication 975 MILLIGRAM(S): at 12:44

## 2023-12-25 RX ADMIN — ENOXAPARIN SODIUM 40 MILLIGRAM(S): 100 INJECTION SUBCUTANEOUS at 18:32

## 2023-12-25 RX ADMIN — Medication 975 MILLIGRAM(S): at 05:48

## 2023-12-25 NOTE — PROGRESS NOTE ADULT - SUBJECTIVE AND OBJECTIVE BOX
[facility] Division of Hospital Medicine  Chante Douglas MD  Pager (M-F, 8A-5P): [phone]  Other Times:  [id number]    Patient is a 68y old  Male who presents with a chief complaint of Fall (24 Dec 2023 14:21)      SUBJECTIVE / OVERNIGHT EVENTS:  No acute events overnight.  Pt continues to have pain but improved from admission.     MEDICATIONS  (STANDING):  acetaminophen     Tablet .. 975 milliGRAM(s) Oral every 6 hours  amLODIPine   Tablet 10 milliGRAM(s) Oral daily  atorvastatin 80 milliGRAM(s) Oral at bedtime  enoxaparin Injectable 40 milliGRAM(s) SubCutaneous every 24 hours  lidocaine   4% Patch 1 Patch Transdermal daily  tamsulosin 0.4 milliGRAM(s) Oral at bedtime    MEDICATIONS  (PRN):  HYDROmorphone  Injectable 0.5 milliGRAM(s) IV Push every 6 hours PRN Severe Pain (7 - 10)  oxyCODONE    IR 5 milliGRAM(s) Oral every 6 hours PRN Moderate Pain (4 - 6)  polyethylene glycol 3350 17 Gram(s) Oral daily PRN Constipation  zaleplon 5 milliGRAM(s) Oral at bedtime PRN Insomnia      CAPILLARY BLOOD GLUCOSE        I&O's Summary    24 Dec 2023 07:01  -  25 Dec 2023 07:00  --------------------------------------------------------  IN: 150 mL / OUT: 350 mL / NET: -200 mL        PHYSICAL EXAM:  Vital Signs Last 24 Hrs  T(C): 36.7 (25 Dec 2023 09:50), Max: 36.9 (25 Dec 2023 05:41)  T(F): 98 (25 Dec 2023 09:50), Max: 98.5 (25 Dec 2023 05:41)  HR: 94 (25 Dec 2023 09:50) (79 - 94)  BP: 122/75 (25 Dec 2023 09:50) (118/78 - 133/79)  BP(mean): --  RR: 17 (25 Dec 2023 09:50) (16 - 18)  SpO2: 95% (25 Dec 2023 09:50) (95% - 95%)    Parameters below as of 25 Dec 2023 09:50  Patient On (Oxygen Delivery Method): room air        CONSTITUTIONAL: NAD, well-developed, well-groomed  EYES: EOMI; conjunctiva and sclera clear  ENMT: Moist oral mucosa  RESPIRATORY: Normal respiratory effort; lungs are clear to auscultation bilaterally  CARDIOVASCULAR: Regular rate and rhythm, normal S1 and S2, no murmur; No lower extremity edema  ABDOMEN: Nontender to palpation, normoactive bowel sounds, no rebound/guarding  MUSCULOSKELETAL:   R vanna-lateral thigh pain with palpation   PSYCH: A+O to person, place, and time; affect appropriate  NEUROLOGY: CN 2-12 are intact and symmetric; no gross sensory deficits   SKIN: No rashes; no palpable lesions    LABS:                        15.2   6.06  )-----------( 175      ( 25 Dec 2023 06:17 )             45.4     12-25    140  |  106  |  11  ----------------------------<  105<H>  3.8   |  23  |  0.80    Ca    8.6      25 Dec 2023 06:17  Phos  3.9     12-25  Mg     2.00     12-25    TPro  6.4  /  Alb  3.9  /  TBili  0.7  /  DBili  x   /  AST  23  /  ALT  26  /  AlkPhos  90  12-25          Urinalysis Basic - ( 25 Dec 2023 06:17 )    Color: x / Appearance: x / SG: x / pH: x  Gluc: 105 mg/dL / Ketone: x  / Bili: x / Urobili: x   Blood: x / Protein: x / Nitrite: x   Leuk Esterase: x / RBC: x / WBC x   Sq Epi: x / Non Sq Epi: x / Bacteria: x        RADIOLOGY & ADDITIONAL TESTS:  Results Reviewed:   Imaging Personally Reviewed:  Electrocardiogram Personally Reviewed:    COORDINATION OF CARE:  Care Discussed with Consultants/Other Providers [Y/N]: Y  Prior or Outpatient Records Reviewed [Y/N]: Y   [facility]  Chante Douglas MD  Pager (M-F, 8A-5P): [phone]  Other Times:  [id number]    Patient is a 68y old  Male who presents with a chief complaint of Fall (24 Dec 2023 14:21)      SUBJECTIVE / OVERNIGHT EVENTS:  No acute events overnight.  Pt continues to have pain but improved from admission.     MEDICATIONS  (STANDING):  acetaminophen     Tablet .. 975 milliGRAM(s) Oral every 6 hours  amLODIPine   Tablet 10 milliGRAM(s) Oral daily  atorvastatin 80 milliGRAM(s) Oral at bedtime  enoxaparin Injectable 40 milliGRAM(s) SubCutaneous every 24 hours  lidocaine   4% Patch 1 Patch Transdermal daily  tamsulosin 0.4 milliGRAM(s) Oral at bedtime    MEDICATIONS  (PRN):  HYDROmorphone  Injectable 0.5 milliGRAM(s) IV Push every 6 hours PRN Severe Pain (7 - 10)  oxyCODONE    IR 5 milliGRAM(s) Oral every 6 hours PRN Moderate Pain (4 - 6)  polyethylene glycol 3350 17 Gram(s) Oral daily PRN Constipation  zaleplon 5 milliGRAM(s) Oral at bedtime PRN Insomnia      CAPILLARY BLOOD GLUCOSE        I&O's Summary    24 Dec 2023 07:01  -  25 Dec 2023 07:00  --------------------------------------------------------  IN: 150 mL / OUT: 350 mL / NET: -200 mL        PHYSICAL EXAM:  Vital Signs Last 24 Hrs  T(C): 36.7 (25 Dec 2023 09:50), Max: 36.9 (25 Dec 2023 05:41)  T(F): 98 (25 Dec 2023 09:50), Max: 98.5 (25 Dec 2023 05:41)  HR: 94 (25 Dec 2023 09:50) (79 - 94)  BP: 122/75 (25 Dec 2023 09:50) (118/78 - 133/79)  BP(mean): --  RR: 17 (25 Dec 2023 09:50) (16 - 18)  SpO2: 95% (25 Dec 2023 09:50) (95% - 95%)    Parameters below as of 25 Dec 2023 09:50  Patient On (Oxygen Delivery Method): room air        CONSTITUTIONAL: NAD, well-developed, well-groomed  EYES: EOMI; conjunctiva and sclera clear  ENMT: Moist oral mucosa  RESPIRATORY: Normal respiratory effort; lungs are clear to auscultation bilaterally  CARDIOVASCULAR: Regular rate and rhythm, normal S1 and S2, no murmur; No lower extremity edema  ABDOMEN: Nontender to palpation, normoactive bowel sounds, no rebound/guarding  MUSCULOSKELETAL:   R vanna-lateral thigh pain with palpation   PSYCH: A+O to person, place, and time; affect appropriate  NEUROLOGY: CN 2-12 are intact and symmetric; no gross sensory deficits   SKIN: No rashes; no palpable lesions    LABS:                        15.2   6.06  )-----------( 175      ( 25 Dec 2023 06:17 )             45.4     12-25    140  |  106  |  11  ----------------------------<  105<H>  3.8   |  23  |  0.80    Ca    8.6      25 Dec 2023 06:17  Phos  3.9     12-25  Mg     2.00     12-25    TPro  6.4  /  Alb  3.9  /  TBili  0.7  /  DBili  x   /  AST  23  /  ALT  26  /  AlkPhos  90  12-25          Urinalysis Basic - ( 25 Dec 2023 06:17 )    Color: x / Appearance: x / SG: x / pH: x  Gluc: 105 mg/dL / Ketone: x  / Bili: x / Urobili: x   Blood: x / Protein: x / Nitrite: x   Leuk Esterase: x / RBC: x / WBC x   Sq Epi: x / Non Sq Epi: x / Bacteria: x        RADIOLOGY & ADDITIONAL TESTS:  Results Reviewed:   Imaging Personally Reviewed:  Electrocardiogram Personally Reviewed:    COORDINATION OF CARE:  Care Discussed with Consultants/Other Providers [Y/N]: Y  Prior or Outpatient Records Reviewed [Y/N]: Y

## 2023-12-26 LAB
ALBUMIN SERPL ELPH-MCNC: 3.6 G/DL — SIGNIFICANT CHANGE UP (ref 3.3–5)
ALBUMIN SERPL ELPH-MCNC: 3.6 G/DL — SIGNIFICANT CHANGE UP (ref 3.3–5)
ALP SERPL-CCNC: 79 U/L — SIGNIFICANT CHANGE UP (ref 40–120)
ALP SERPL-CCNC: 79 U/L — SIGNIFICANT CHANGE UP (ref 40–120)
ALT FLD-CCNC: 29 U/L — SIGNIFICANT CHANGE UP (ref 4–41)
ALT FLD-CCNC: 29 U/L — SIGNIFICANT CHANGE UP (ref 4–41)
ANION GAP SERPL CALC-SCNC: 10 MMOL/L — SIGNIFICANT CHANGE UP (ref 7–14)
ANION GAP SERPL CALC-SCNC: 10 MMOL/L — SIGNIFICANT CHANGE UP (ref 7–14)
AST SERPL-CCNC: 28 U/L — SIGNIFICANT CHANGE UP (ref 4–40)
AST SERPL-CCNC: 28 U/L — SIGNIFICANT CHANGE UP (ref 4–40)
BASOPHILS # BLD AUTO: 0.01 K/UL — SIGNIFICANT CHANGE UP (ref 0–0.2)
BASOPHILS # BLD AUTO: 0.01 K/UL — SIGNIFICANT CHANGE UP (ref 0–0.2)
BASOPHILS NFR BLD AUTO: 0.2 % — SIGNIFICANT CHANGE UP (ref 0–2)
BASOPHILS NFR BLD AUTO: 0.2 % — SIGNIFICANT CHANGE UP (ref 0–2)
BILIRUB SERPL-MCNC: 0.6 MG/DL — SIGNIFICANT CHANGE UP (ref 0.2–1.2)
BILIRUB SERPL-MCNC: 0.6 MG/DL — SIGNIFICANT CHANGE UP (ref 0.2–1.2)
BUN SERPL-MCNC: 13 MG/DL — SIGNIFICANT CHANGE UP (ref 7–23)
BUN SERPL-MCNC: 13 MG/DL — SIGNIFICANT CHANGE UP (ref 7–23)
CALCIUM SERPL-MCNC: 8.3 MG/DL — LOW (ref 8.4–10.5)
CALCIUM SERPL-MCNC: 8.3 MG/DL — LOW (ref 8.4–10.5)
CHLORIDE SERPL-SCNC: 107 MMOL/L — SIGNIFICANT CHANGE UP (ref 98–107)
CHLORIDE SERPL-SCNC: 107 MMOL/L — SIGNIFICANT CHANGE UP (ref 98–107)
CO2 SERPL-SCNC: 25 MMOL/L — SIGNIFICANT CHANGE UP (ref 22–31)
CO2 SERPL-SCNC: 25 MMOL/L — SIGNIFICANT CHANGE UP (ref 22–31)
CREAT SERPL-MCNC: 0.78 MG/DL — SIGNIFICANT CHANGE UP (ref 0.5–1.3)
CREAT SERPL-MCNC: 0.78 MG/DL — SIGNIFICANT CHANGE UP (ref 0.5–1.3)
EGFR: 96 ML/MIN/1.73M2 — SIGNIFICANT CHANGE UP
EGFR: 96 ML/MIN/1.73M2 — SIGNIFICANT CHANGE UP
EOSINOPHIL # BLD AUTO: 0.18 K/UL — SIGNIFICANT CHANGE UP (ref 0–0.5)
EOSINOPHIL # BLD AUTO: 0.18 K/UL — SIGNIFICANT CHANGE UP (ref 0–0.5)
EOSINOPHIL NFR BLD AUTO: 3.3 % — SIGNIFICANT CHANGE UP (ref 0–6)
EOSINOPHIL NFR BLD AUTO: 3.3 % — SIGNIFICANT CHANGE UP (ref 0–6)
GLUCOSE SERPL-MCNC: 121 MG/DL — HIGH (ref 70–99)
GLUCOSE SERPL-MCNC: 121 MG/DL — HIGH (ref 70–99)
HCT VFR BLD CALC: 40.3 % — SIGNIFICANT CHANGE UP (ref 39–50)
HCT VFR BLD CALC: 40.3 % — SIGNIFICANT CHANGE UP (ref 39–50)
HGB BLD-MCNC: 13.7 G/DL — SIGNIFICANT CHANGE UP (ref 13–17)
HGB BLD-MCNC: 13.7 G/DL — SIGNIFICANT CHANGE UP (ref 13–17)
IANC: 3.22 K/UL — SIGNIFICANT CHANGE UP (ref 1.8–7.4)
IANC: 3.22 K/UL — SIGNIFICANT CHANGE UP (ref 1.8–7.4)
IMM GRANULOCYTES NFR BLD AUTO: 0.6 % — SIGNIFICANT CHANGE UP (ref 0–0.9)
IMM GRANULOCYTES NFR BLD AUTO: 0.6 % — SIGNIFICANT CHANGE UP (ref 0–0.9)
LYMPHOCYTES # BLD AUTO: 1.46 K/UL — SIGNIFICANT CHANGE UP (ref 1–3.3)
LYMPHOCYTES # BLD AUTO: 1.46 K/UL — SIGNIFICANT CHANGE UP (ref 1–3.3)
LYMPHOCYTES # BLD AUTO: 27.1 % — SIGNIFICANT CHANGE UP (ref 13–44)
LYMPHOCYTES # BLD AUTO: 27.1 % — SIGNIFICANT CHANGE UP (ref 13–44)
MAGNESIUM SERPL-MCNC: 2 MG/DL — SIGNIFICANT CHANGE UP (ref 1.6–2.6)
MAGNESIUM SERPL-MCNC: 2 MG/DL — SIGNIFICANT CHANGE UP (ref 1.6–2.6)
MCHC RBC-ENTMCNC: 30.3 PG — SIGNIFICANT CHANGE UP (ref 27–34)
MCHC RBC-ENTMCNC: 30.3 PG — SIGNIFICANT CHANGE UP (ref 27–34)
MCHC RBC-ENTMCNC: 34 GM/DL — SIGNIFICANT CHANGE UP (ref 32–36)
MCHC RBC-ENTMCNC: 34 GM/DL — SIGNIFICANT CHANGE UP (ref 32–36)
MCV RBC AUTO: 89.2 FL — SIGNIFICANT CHANGE UP (ref 80–100)
MCV RBC AUTO: 89.2 FL — SIGNIFICANT CHANGE UP (ref 80–100)
MONOCYTES # BLD AUTO: 0.48 K/UL — SIGNIFICANT CHANGE UP (ref 0–0.9)
MONOCYTES # BLD AUTO: 0.48 K/UL — SIGNIFICANT CHANGE UP (ref 0–0.9)
MONOCYTES NFR BLD AUTO: 8.9 % — SIGNIFICANT CHANGE UP (ref 2–14)
MONOCYTES NFR BLD AUTO: 8.9 % — SIGNIFICANT CHANGE UP (ref 2–14)
NEUTROPHILS # BLD AUTO: 3.22 K/UL — SIGNIFICANT CHANGE UP (ref 1.8–7.4)
NEUTROPHILS # BLD AUTO: 3.22 K/UL — SIGNIFICANT CHANGE UP (ref 1.8–7.4)
NEUTROPHILS NFR BLD AUTO: 59.9 % — SIGNIFICANT CHANGE UP (ref 43–77)
NEUTROPHILS NFR BLD AUTO: 59.9 % — SIGNIFICANT CHANGE UP (ref 43–77)
NRBC # BLD: 0 /100 WBCS — SIGNIFICANT CHANGE UP (ref 0–0)
NRBC # BLD: 0 /100 WBCS — SIGNIFICANT CHANGE UP (ref 0–0)
NRBC # FLD: 0 K/UL — SIGNIFICANT CHANGE UP (ref 0–0)
NRBC # FLD: 0 K/UL — SIGNIFICANT CHANGE UP (ref 0–0)
PHOSPHATE SERPL-MCNC: 3.5 MG/DL — SIGNIFICANT CHANGE UP (ref 2.5–4.5)
PHOSPHATE SERPL-MCNC: 3.5 MG/DL — SIGNIFICANT CHANGE UP (ref 2.5–4.5)
PLATELET # BLD AUTO: 164 K/UL — SIGNIFICANT CHANGE UP (ref 150–400)
PLATELET # BLD AUTO: 164 K/UL — SIGNIFICANT CHANGE UP (ref 150–400)
POTASSIUM SERPL-MCNC: 4 MMOL/L — SIGNIFICANT CHANGE UP (ref 3.5–5.3)
POTASSIUM SERPL-MCNC: 4 MMOL/L — SIGNIFICANT CHANGE UP (ref 3.5–5.3)
POTASSIUM SERPL-SCNC: 4 MMOL/L — SIGNIFICANT CHANGE UP (ref 3.5–5.3)
POTASSIUM SERPL-SCNC: 4 MMOL/L — SIGNIFICANT CHANGE UP (ref 3.5–5.3)
PROT SERPL-MCNC: 6.2 G/DL — SIGNIFICANT CHANGE UP (ref 6–8.3)
PROT SERPL-MCNC: 6.2 G/DL — SIGNIFICANT CHANGE UP (ref 6–8.3)
RBC # BLD: 4.52 M/UL — SIGNIFICANT CHANGE UP (ref 4.2–5.8)
RBC # BLD: 4.52 M/UL — SIGNIFICANT CHANGE UP (ref 4.2–5.8)
RBC # FLD: 13.2 % — SIGNIFICANT CHANGE UP (ref 10.3–14.5)
RBC # FLD: 13.2 % — SIGNIFICANT CHANGE UP (ref 10.3–14.5)
SODIUM SERPL-SCNC: 142 MMOL/L — SIGNIFICANT CHANGE UP (ref 135–145)
SODIUM SERPL-SCNC: 142 MMOL/L — SIGNIFICANT CHANGE UP (ref 135–145)
WBC # BLD: 5.38 K/UL — SIGNIFICANT CHANGE UP (ref 3.8–10.5)
WBC # BLD: 5.38 K/UL — SIGNIFICANT CHANGE UP (ref 3.8–10.5)
WBC # FLD AUTO: 5.38 K/UL — SIGNIFICANT CHANGE UP (ref 3.8–10.5)
WBC # FLD AUTO: 5.38 K/UL — SIGNIFICANT CHANGE UP (ref 3.8–10.5)

## 2023-12-26 PROCEDURE — 99232 SBSQ HOSP IP/OBS MODERATE 35: CPT

## 2023-12-26 RX ADMIN — Medication 975 MILLIGRAM(S): at 06:15

## 2023-12-26 RX ADMIN — Medication 975 MILLIGRAM(S): at 13:02

## 2023-12-26 RX ADMIN — Medication 975 MILLIGRAM(S): at 23:26

## 2023-12-26 RX ADMIN — Medication 5 MILLIGRAM(S): at 00:05

## 2023-12-26 RX ADMIN — Medication 975 MILLIGRAM(S): at 01:10

## 2023-12-26 RX ADMIN — Medication 975 MILLIGRAM(S): at 19:11

## 2023-12-26 RX ADMIN — ENOXAPARIN SODIUM 40 MILLIGRAM(S): 100 INJECTION SUBCUTANEOUS at 19:11

## 2023-12-26 RX ADMIN — Medication 5 MILLIGRAM(S): at 23:26

## 2023-12-26 RX ADMIN — OXYCODONE HYDROCHLORIDE 5 MILLIGRAM(S): 5 TABLET ORAL at 09:00

## 2023-12-26 RX ADMIN — Medication 975 MILLIGRAM(S): at 07:15

## 2023-12-26 RX ADMIN — OXYCODONE HYDROCHLORIDE 5 MILLIGRAM(S): 5 TABLET ORAL at 10:00

## 2023-12-26 RX ADMIN — Medication 975 MILLIGRAM(S): at 00:06

## 2023-12-26 NOTE — PROGRESS NOTE ADULT - PROBLEM SELECTOR PLAN 1
- RLE anterior thigh hematoma s/p fall   - CT R hip showing hematoma of RLE , Ill-defined intramuscular hematoma in the anterior/lateral thigh.  - surgery evaluated pt for compartment syndrome, no evidence at this time. pulses intact/sensations intact   - pain control with tylenol and oxycodone prn  - lidocaine patch as needed   - serial exams for compartment syndrome   - PT recs KWAN  - Dispo: medically cleared for DC to rehab, f/u SW

## 2023-12-26 NOTE — PROGRESS NOTE ADULT - SUBJECTIVE AND OBJECTIVE BOX
Dr. Yenny Dozier  Pager [phone]    PROGRESS NOTE:     Patient is a 68y old  Male who presents with a chief complaint of Fall (25 Dec 2023 12:20)      SUBJECTIVE / OVERNIGHT EVENTS: denies chest pain or sob   ADDITIONAL REVIEW OF SYSTEMS: c/o right thigh swelling and pain from hematoma    MEDICATIONS  (STANDING):  acetaminophen     Tablet .. 975 milliGRAM(s) Oral every 6 hours  amLODIPine   Tablet 10 milliGRAM(s) Oral daily  atorvastatin 80 milliGRAM(s) Oral at bedtime  enoxaparin Injectable 40 milliGRAM(s) SubCutaneous every 24 hours  lidocaine   4% Patch 1 Patch Transdermal daily  tamsulosin 0.4 milliGRAM(s) Oral at bedtime    MEDICATIONS  (PRN):  HYDROmorphone  Injectable 0.5 milliGRAM(s) IV Push every 6 hours PRN Severe Pain (7 - 10)  oxyCODONE    IR 5 milliGRAM(s) Oral every 6 hours PRN Moderate Pain (4 - 6)  polyethylene glycol 3350 17 Gram(s) Oral daily PRN Constipation  zaleplon 5 milliGRAM(s) Oral at bedtime PRN Insomnia      CAPILLARY BLOOD GLUCOSE        I&O's Summary    26 Dec 2023 07:01  -  26 Dec 2023 13:27  --------------------------------------------------------  IN: 0 mL / OUT: 500 mL / NET: -500 mL        PHYSICAL EXAM:  Vital Signs Last 24 Hrs  T(C): 36.9 (26 Dec 2023 09:18), Max: 36.9 (26 Dec 2023 09:18)  T(F): 98.4 (26 Dec 2023 09:18), Max: 98.4 (26 Dec 2023 09:18)  HR: 85 (26 Dec 2023 09:18) (64 - 93)  BP: 132/74 (26 Dec 2023 09:18) (119/70 - 132/74)  BP(mean): --  RR: 17 (26 Dec 2023 09:18) (16 - 17)  SpO2: 98% (26 Dec 2023 09:18) (94% - 98%)    Parameters below as of 26 Dec 2023 09:18  Patient On (Oxygen Delivery Method): room air    CONSTITUTIONAL: NAD, well-developed, well-groomed  EYES: EOMI; conjunctiva and sclera clear  ENMT: Moist oral mucosa  RESPIRATORY: Normal respiratory effort; lungs are clear to auscultation bilaterally  CARDIOVASCULAR: Regular rate and rhythm, normal S1 and S2, no murmur; No lower extremity edema  ABDOMEN: Nontender to palpation, normoactive bowel sounds, no rebound/guarding  MUSCULOSKELETAL:   right thigh swelling, TTP  PSYCH: A+O to person, place, and time; affect appropriate  NEUROLOGY: CN 2-12 are intact and symmetric; no gross sensory deficits   SKIN: No rashes; no palpable lesions    LABS:                        13.7   5.38  )-----------( 164      ( 26 Dec 2023 07:06 )             40.3     12-26    142  |  107  |  13  ----------------------------<  121<H>  4.0   |  25  |  0.78    Ca    8.3<L>      26 Dec 2023 07:06  Phos  3.5     12-26  Mg     2.00     12-26    TPro  6.2  /  Alb  3.6  /  TBili  0.6  /  DBili  x   /  AST  28  /  ALT  29  /  AlkPhos  79  12-26          Urinalysis Basic - ( 26 Dec 2023 07:06 )    Color: x / Appearance: x / SG: x / pH: x  Gluc: 121 mg/dL / Ketone: x  / Bili: x / Urobili: x   Blood: x / Protein: x / Nitrite: x   Leuk Esterase: x / RBC: x / WBC x   Sq Epi: x / Non Sq Epi: x / Bacteria: x      RADIOLOGY & ADDITIONAL TESTS:  Results Reviewed:   Imaging Personally Reviewed:  < from: CT Lumbar Spine No Cont (12.22.23 @ 14:39) >  IMPRESSION:  Posterior instrumentation/fusion at L4-L5. No evidence for acute hardware   complication.  No acute fracture identified.      < from: CT Hip No Cont, Right (12.22.23 @ 14:38) >  IMPRESSION:  1.  No acute fracture or dislocation.  2.  Ill-defined intramuscular hematoma in the anterior/lateral thigh.    < from: Xray Femur 2 Views, Right (12.22.23 @ 13:09) >    IMPRESSION:  1.  No acute osseous abnormalities.        Electrocardiogram Personally Reviewed:    COORDINATION OF CARE:  Care Discussed with Consultants/Other Providers [Y/N]:  Prior or Outpatient Records Reviewed [Y/N]:   Dr. Yenny Dozier  Pager [phone]    PROGRESS NOTE:     Patient is a 68y old  Male who presents with a chief complaint of Fall (25 Dec 2023 12:20)      SUBJECTIVE / OVERNIGHT EVENTS: denies chest pain or sob   ADDITIONAL REVIEW OF SYSTEMS: c/o right thigh swelling and pain from hematoma    MEDICATIONS  (STANDING):  acetaminophen     Tablet .. 975 milliGRAM(s) Oral every 6 hours  amLODIPine   Tablet 10 milliGRAM(s) Oral daily  atorvastatin 80 milliGRAM(s) Oral at bedtime  enoxaparin Injectable 40 milliGRAM(s) SubCutaneous every 24 hours  lidocaine   4% Patch 1 Patch Transdermal daily  tamsulosin 0.4 milliGRAM(s) Oral at bedtime    MEDICATIONS  (PRN):  HYDROmorphone  Injectable 0.5 milliGRAM(s) IV Push every 6 hours PRN Severe Pain (7 - 10)  oxyCODONE    IR 5 milliGRAM(s) Oral every 6 hours PRN Moderate Pain (4 - 6)  polyethylene glycol 3350 17 Gram(s) Oral daily PRN Constipation  zaleplon 5 milliGRAM(s) Oral at bedtime PRN Insomnia      CAPILLARY BLOOD GLUCOSE        I&O's Summary    26 Dec 2023 07:01  -  26 Dec 2023 13:27  --------------------------------------------------------  IN: 0 mL / OUT: 500 mL / NET: -500 mL        PHYSICAL EXAM:  Vital Signs Last 24 Hrs  T(C): 36.9 (26 Dec 2023 09:18), Max: 36.9 (26 Dec 2023 09:18)  T(F): 98.4 (26 Dec 2023 09:18), Max: 98.4 (26 Dec 2023 09:18)  HR: 85 (26 Dec 2023 09:18) (64 - 93)  BP: 132/74 (26 Dec 2023 09:18) (119/70 - 132/74)  BP(mean): --  RR: 17 (26 Dec 2023 09:18) (16 - 17)  SpO2: 98% (26 Dec 2023 09:18) (94% - 98%)    Parameters below as of 26 Dec 2023 09:18  Patient On (Oxygen Delivery Method): room air    CONSTITUTIONAL: NAD, well-developed, well-groomed  EYES: EOMI; conjunctiva and sclera clear  ENMT: Moist oral mucosa  RESPIRATORY: Normal respiratory effort; lungs are clear to auscultation bilaterally  CARDIOVASCULAR: Regular rate and rhythm, normal S1 and S2, no murmur; No lower extremity edema  ABDOMEN: Nontender to palpation, normoactive bowel sounds, no rebound/guarding  MUSCULOSKELETAL:   right thigh swelling, TTP  PSYCH: A+O to person, place, and time; affect appropriate  NEUROLOGY: CN 2-12 are intact and symmetric; no gross sensory deficits   SKIN: No rashes; no palpable lesions    LABS:                        13.7   5.38  )-----------( 164      ( 26 Dec 2023 07:06 )             40.3     12-26    142  |  107  |  13  ----------------------------<  121<H>  4.0   |  25  |  0.78    Ca    8.3<L>      26 Dec 2023 07:06  Phos  3.5     12-26  Mg     2.00     12-26    TPro  6.2  /  Alb  3.6  /  TBili  0.6  /  DBili  x   /  AST  28  /  ALT  29  /  AlkPhos  79  12-26          Urinalysis Basic - ( 26 Dec 2023 07:06 )    Color: x / Appearance: x / SG: x / pH: x  Gluc: 121 mg/dL / Ketone: x  / Bili: x / Urobili: x   Blood: x / Protein: x / Nitrite: x   Leuk Esterase: x / RBC: x / WBC x   Sq Epi: x / Non Sq Epi: x / Bacteria: x      RADIOLOGY & ADDITIONAL TESTS:  Results Reviewed:   Imaging Personally Reviewed:  < from: CT Lumbar Spine No Cont (12.22.23 @ 14:39) >  IMPRESSION:  Posterior instrumentation/fusion at L4-L5. No evidence for acute hardware   complication.  No acute fracture identified.      < from: CT Hip No Cont, Right (12.22.23 @ 14:38) >  IMPRESSION:  1.  No acute fracture or dislocation.  2.  Ill-defined intramuscular hematoma in the anterior/lateral thigh.    < from: Xray Femur 2 Views, Right (12.22.23 @ 13:09) >    IMPRESSION:  1.  No acute osseous abnormalities.        Electrocardiogram Personally Reviewed:    COORDINATION OF CARE:  Care Discussed with Consultants/Other Providers [Y/N]:  Prior or Outpatient Records Reviewed [Y/N]:

## 2023-12-26 NOTE — PROGRESS NOTE ADULT - ASSESSMENT
67yo male with pmh HTN, HLD, prev DVT, history of lumbar laminectomy in April 2023, bariatric surgery 15 years ago presenting after a fall at work, fell on his right side. Patient denied any dizziness prior to fall, denied any head trauma, no LOC, reports pain at R anterior thigh. Xray of RLE showed no fractures, CT R hip hematoma. VSS, labs were unremarkable. Admitted for management of thigh hematoma and fall.  69yo male with pmh HTN, HLD, prev DVT, history of lumbar laminectomy in April 2023, bariatric surgery 15 years ago presenting after a fall at work, fell on his right side. Patient denied any dizziness prior to fall, denied any head trauma, no LOC, reports pain at R anterior thigh. Xray of RLE showed no fractures, CT R hip hematoma. VSS, labs were unremarkable. Admitted for management of thigh hematoma and fall.

## 2023-12-27 ENCOUNTER — TRANSCRIPTION ENCOUNTER (OUTPATIENT)
Age: 68
End: 2023-12-27

## 2023-12-27 LAB
HCT VFR BLD CALC: 45 % — SIGNIFICANT CHANGE UP (ref 39–50)
HCT VFR BLD CALC: 45 % — SIGNIFICANT CHANGE UP (ref 39–50)
HGB BLD-MCNC: 15.3 G/DL — SIGNIFICANT CHANGE UP (ref 13–17)
HGB BLD-MCNC: 15.3 G/DL — SIGNIFICANT CHANGE UP (ref 13–17)
MCHC RBC-ENTMCNC: 30.2 PG — SIGNIFICANT CHANGE UP (ref 27–34)
MCHC RBC-ENTMCNC: 30.2 PG — SIGNIFICANT CHANGE UP (ref 27–34)
MCHC RBC-ENTMCNC: 34 GM/DL — SIGNIFICANT CHANGE UP (ref 32–36)
MCHC RBC-ENTMCNC: 34 GM/DL — SIGNIFICANT CHANGE UP (ref 32–36)
MCV RBC AUTO: 88.8 FL — SIGNIFICANT CHANGE UP (ref 80–100)
MCV RBC AUTO: 88.8 FL — SIGNIFICANT CHANGE UP (ref 80–100)
NRBC # BLD: 0 /100 WBCS — SIGNIFICANT CHANGE UP (ref 0–0)
NRBC # BLD: 0 /100 WBCS — SIGNIFICANT CHANGE UP (ref 0–0)
NRBC # FLD: 0 K/UL — SIGNIFICANT CHANGE UP (ref 0–0)
NRBC # FLD: 0 K/UL — SIGNIFICANT CHANGE UP (ref 0–0)
PLATELET # BLD AUTO: 179 K/UL — SIGNIFICANT CHANGE UP (ref 150–400)
PLATELET # BLD AUTO: 179 K/UL — SIGNIFICANT CHANGE UP (ref 150–400)
RBC # BLD: 5.07 M/UL — SIGNIFICANT CHANGE UP (ref 4.2–5.8)
RBC # BLD: 5.07 M/UL — SIGNIFICANT CHANGE UP (ref 4.2–5.8)
RBC # FLD: 13.3 % — SIGNIFICANT CHANGE UP (ref 10.3–14.5)
RBC # FLD: 13.3 % — SIGNIFICANT CHANGE UP (ref 10.3–14.5)
WBC # BLD: 7.37 K/UL — SIGNIFICANT CHANGE UP (ref 3.8–10.5)
WBC # BLD: 7.37 K/UL — SIGNIFICANT CHANGE UP (ref 3.8–10.5)
WBC # FLD AUTO: 7.37 K/UL — SIGNIFICANT CHANGE UP (ref 3.8–10.5)
WBC # FLD AUTO: 7.37 K/UL — SIGNIFICANT CHANGE UP (ref 3.8–10.5)

## 2023-12-27 PROCEDURE — 99232 SBSQ HOSP IP/OBS MODERATE 35: CPT

## 2023-12-27 RX ADMIN — OXYCODONE HYDROCHLORIDE 5 MILLIGRAM(S): 5 TABLET ORAL at 10:48

## 2023-12-27 RX ADMIN — OXYCODONE HYDROCHLORIDE 5 MILLIGRAM(S): 5 TABLET ORAL at 16:46

## 2023-12-27 RX ADMIN — Medication 975 MILLIGRAM(S): at 07:00

## 2023-12-27 RX ADMIN — OXYCODONE HYDROCHLORIDE 5 MILLIGRAM(S): 5 TABLET ORAL at 11:48

## 2023-12-27 RX ADMIN — Medication 975 MILLIGRAM(S): at 00:26

## 2023-12-27 RX ADMIN — OXYCODONE HYDROCHLORIDE 5 MILLIGRAM(S): 5 TABLET ORAL at 17:46

## 2023-12-27 RX ADMIN — OXYCODONE HYDROCHLORIDE 5 MILLIGRAM(S): 5 TABLET ORAL at 22:59

## 2023-12-27 RX ADMIN — Medication 975 MILLIGRAM(S): at 05:57

## 2023-12-27 NOTE — DISCHARGE NOTE NURSING/CASE MANAGEMENT/SOCIAL WORK - NSDCPNINST_GEN_ALL_CORE
Maintain  incisions and dressings clean dry and intact. Call MD with any signs of bleeding, unrelieved increased pain.  Avoid strenuous activity and constipation which may be a side effect from taking certain medications and narcotics.  Continue safety and fall prevention measures as instructed to avoid injury.   Notify Dr Figueroa if you experience any increase in pain not relieved with medication or any fever >100.5.  Drink plenty of fluids.  No heavy lifting or straining.  Maintain safety precautions in the home to reduce the risk of falling.

## 2023-12-27 NOTE — PROGRESS NOTE ADULT - SUBJECTIVE AND OBJECTIVE BOX
Dr. Yenny Dozier  Pager [phone]    PROGRESS NOTE:     Patient is a 68y old  Male who presents with a chief complaint of Fall (26 Dec 2023 13:26)      SUBJECTIVE / OVERNIGHT EVENTS: denies chest pain or sob   ADDITIONAL REVIEW OF SYSTEMS: afebrile     MEDICATIONS  (STANDING):  acetaminophen     Tablet .. 650 milliGRAM(s) Oral every 6 hours  amLODIPine   Tablet 10 milliGRAM(s) Oral daily  atorvastatin 80 milliGRAM(s) Oral at bedtime  enoxaparin Injectable 40 milliGRAM(s) SubCutaneous every 24 hours  lidocaine   4% Patch 1 Patch Transdermal daily  tamsulosin 0.4 milliGRAM(s) Oral at bedtime    MEDICATIONS  (PRN):  HYDROmorphone  Injectable 0.5 milliGRAM(s) IV Push every 6 hours PRN Severe Pain (7 - 10)  oxyCODONE    IR 5 milliGRAM(s) Oral every 6 hours PRN Moderate Pain (4 - 6)  polyethylene glycol 3350 17 Gram(s) Oral daily PRN Constipation  zaleplon 5 milliGRAM(s) Oral at bedtime PRN Insomnia      CAPILLARY BLOOD GLUCOSE        I&O's Summary    26 Dec 2023 07:01  -  27 Dec 2023 07:00  --------------------------------------------------------  IN: 0 mL / OUT: 750 mL / NET: -750 mL    27 Dec 2023 07:01  -  27 Dec 2023 15:01  --------------------------------------------------------  IN: 0 mL / OUT: 500 mL / NET: -500 mL        PHYSICAL EXAM:  Vital Signs Last 24 Hrs  T(C): 36.9 (27 Dec 2023 09:32), Max: 36.9 (26 Dec 2023 17:40)  T(F): 98.4 (27 Dec 2023 09:32), Max: 98.4 (26 Dec 2023 17:40)  HR: 87 (27 Dec 2023 09:32) (69 - 93)  BP: 122/72 (27 Dec 2023 09:32) (119/68 - 128/78)  BP(mean): --  RR: 18 (27 Dec 2023 09:32) (16 - 18)  SpO2: 100% (27 Dec 2023 09:32) (94% - 100%)    Parameters below as of 27 Dec 2023 09:32  Patient On (Oxygen Delivery Method): room air      CONSTITUTIONAL: NAD, well-developed, well-groomed  EYES: EOMI; conjunctiva and sclera clear  ENMT: Moist oral mucosa  RESPIRATORY: Normal respiratory effort; lungs are clear to auscultation bilaterally  CARDIOVASCULAR: Regular rate and rhythm, normal S1 and S2, no murmur; No lower extremity edema  ABDOMEN: Nontender to palpation, normoactive bowel sounds, no rebound/guarding  MUSCULOSKELETAL:   right thigh swelling, TTP  PSYCH: A+O to person, place, and time; affect appropriate  NEUROLOGY: CN 2-12 are intact and symmetric; no gross sensory deficits   SKIN: No rashes; no palpable lesions  LABS:                        13.7   5.38  )-----------( 164      ( 26 Dec 2023 07:06 )             40.3     12-26    142  |  107  |  13  ----------------------------<  121<H>  4.0   |  25  |  0.78    Ca    8.3<L>      26 Dec 2023 07:06  Phos  3.5     12-26  Mg     2.00     12-26    TPro  6.2  /  Alb  3.6  /  TBili  0.6  /  DBili  x   /  AST  28  /  ALT  29  /  AlkPhos  79  12-26          Urinalysis Basic - ( 26 Dec 2023 07:06 )    Color: x / Appearance: x / SG: x / pH: x  Gluc: 121 mg/dL / Ketone: x  / Bili: x / Urobili: x   Blood: x / Protein: x / Nitrite: x   Leuk Esterase: x / RBC: x / WBC x   Sq Epi: x / Non Sq Epi: x / Bacteria: x          RADIOLOGY & ADDITIONAL TESTS:  Results Reviewed:   Imaging Personally Reviewed:  Electrocardiogram Personally Reviewed:    COORDINATION OF CARE:  Care Discussed with Consultants/Other Providers [Y/N]:  Prior or Outpatient Records Reviewed [Y/N]:   Dr. Yenny Dozier  Pager [phone]    PROGRESS NOTE:     Patient is a 68y old  Male who presents with a chief complaint of Fall (26 Dec 2023 13:26)      SUBJECTIVE / OVERNIGHT EVENTS: denies chest pain or sob   ADDITIONAL REVIEW OF SYSTEMS: afebrile     MEDICATIONS  (STANDING):  acetaminophen     Tablet .. 650 milliGRAM(s) Oral every 6 hours  amLODIPine   Tablet 10 milliGRAM(s) Oral daily  atorvastatin 80 milliGRAM(s) Oral at bedtime  enoxaparin Injectable 40 milliGRAM(s) SubCutaneous every 24 hours  lidocaine   4% Patch 1 Patch Transdermal daily  tamsulosin 0.4 milliGRAM(s) Oral at bedtime    MEDICATIONS  (PRN):  HYDROmorphone  Injectable 0.5 milliGRAM(s) IV Push every 6 hours PRN Severe Pain (7 - 10)  oxyCODONE    IR 5 milliGRAM(s) Oral every 6 hours PRN Moderate Pain (4 - 6)  polyethylene glycol 3350 17 Gram(s) Oral daily PRN Constipation  zaleplon 5 milliGRAM(s) Oral at bedtime PRN Insomnia      CAPILLARY BLOOD GLUCOSE        I&O's Summary    26 Dec 2023 07:01  -  27 Dec 2023 07:00  --------------------------------------------------------  IN: 0 mL / OUT: 750 mL / NET: -750 mL    27 Dec 2023 07:01  -  27 Dec 2023 15:01  --------------------------------------------------------  IN: 0 mL / OUT: 500 mL / NET: -500 mL        PHYSICAL EXAM:  Vital Signs Last 24 Hrs  T(C): 36.9 (27 Dec 2023 09:32), Max: 36.9 (26 Dec 2023 17:40)  T(F): 98.4 (27 Dec 2023 09:32), Max: 98.4 (26 Dec 2023 17:40)  HR: 87 (27 Dec 2023 09:32) (69 - 93)  BP: 122/72 (27 Dec 2023 09:32) (119/68 - 128/78)  BP(mean): --  RR: 18 (27 Dec 2023 09:32) (16 - 18)  SpO2: 100% (27 Dec 2023 09:32) (94% - 100%)    Parameters below as of 27 Dec 2023 09:32  Patient On (Oxygen Delivery Method): room air      CONSTITUTIONAL: NAD, well-developed, well-groomed  EYES: EOMI; conjunctiva and sclera clear  ENMT: Moist oral mucosa  RESPIRATORY: Normal respiratory effort; lungs are clear to auscultation bilaterally  CARDIOVASCULAR: Regular rate and rhythm, normal S1 and S2, no murmur; No lower extremity edema  ABDOMEN: Nontender to palpation, normoactive bowel sounds, no rebound/guarding  MUSCULOSKELETAL:   right thigh swelling, TTP  PSYCH: A+O to person, place, and time; affect appropriate  NEUROLOGY: CN 2-12 are intact and symmetric; no gross sensory deficits   SKIN: No rashes; no palpable lesions  LABS:                        13.7   5.38  )-----------( 164      ( 26 Dec 2023 07:06 )             40.3     12-26    142  |  107  |  13  ----------------------------<  121<H>  4.0   |  25  |  0.78    Ca    8.3<L>      26 Dec 2023 07:06  Phos  3.5     12-26  Mg     2.00     12-26    TPro  6.2  /  Alb  3.6  /  TBili  0.6  /  DBili  x   /  AST  28  /  ALT  29  /  AlkPhos  79  12-26          Urinalysis Basic - ( 26 Dec 2023 07:06 )    Color: x / Appearance: x / SG: x / pH: x  Gluc: 121 mg/dL / Ketone: x  / Bili: x / Urobili: x   Blood: x / Protein: x / Nitrite: x   Leuk Esterase: x / RBC: x / WBC x   Sq Epi: x / Non Sq Epi: x / Bacteria: x          RADIOLOGY & ADDITIONAL TESTS:  Results Reviewed:   Imaging Personally Reviewed:  Electrocardiogram Personally Reviewed:    COORDINATION OF CARE:  Care Discussed with Consultants/Other Providers [Y/N]:  Prior or Outpatient Records Reviewed [Y/N]:

## 2023-12-27 NOTE — DISCHARGE NOTE NURSING/CASE MANAGEMENT/SOCIAL WORK - NSDCPEFALRISK_GEN_ALL_CORE
For information on Fall & Injury Prevention, visit: [email or web address] OR  [email or web address] OR  https://www.cdc.gov/steadi/patient.html For information on Fall & Injury Prevention, visit: [email or web address] OR  [email or web address] OR  https://www.cdc.gov/steadi/patient.html

## 2023-12-27 NOTE — DISCHARGE NOTE NURSING/CASE MANAGEMENT/SOCIAL WORK - NSDCPECAREGIVERED_GEN_ALL_CORE
Medline and carenotes for Hematoma. Fall prevention, as well as DC Medications and side effects literature for patient reference.

## 2023-12-27 NOTE — PROGRESS NOTE ADULT - SUBJECTIVE AND OBJECTIVE BOX
SURGERY PROGRESS NOTE    SUBJECTIVE / 24H EVENTS:  Re-paged due to worsening RLE pain. Patient seen and examined. Describes worsening right thigh and calf pain starting this afternoon after working with PT, believes calf and toes are more swollen than previous.       OBJECTIVE:  VITAL SIGNS:  T(C): 36.2 (12-27-23 @ 17:32), Max: 36.9 (12-27-23 @ 09:32)  HR: 66 (12-27-23 @ 17:32) (66 - 87)  BP: 130/80 (12-27-23 @ 17:32) (119/71 - 130/80)  RR: 17 (12-27-23 @ 17:32) (16 - 18)  SpO2: 97% (12-27-23 @ 17:32) (94% - 100%)  Daily     Daily         PHYSICAL EXAM:  Gen: NAD  Neuro: AOx3  LS: Respirations unlabored on RA  Extremities: RLE thigh anterior compartment firm, tender. medial and posterior compartments soft and nontender. no skin changes over thigh, mild ecchymosis at R knee and upper posterolateral calf. 5/5 motor strength with dorsi and planter flexion on L vs. 4/5 on R. Flexion at knee and ankle with limited 2/2 pain, no pain on passive flexion.. Palpable R DP pulse          12-26-23 @ 07:01  -  12-27-23 @ 07:00  --------------------------------------------------------  IN:  Total IN: 0 mL    OUT:    Voided (mL): 750 mL  Total OUT: 750 mL    Total NET: -750 mL      12-27-23 @ 07:01  -  12-27-23 @ 18:39  --------------------------------------------------------  IN:  Total IN: 0 mL    OUT:    Voided (mL): 500 mL  Total OUT: 500 mL    Total NET: -500 mL          LAB VALUES:  12-26    142  |  107  |  13  ----------------------------<  121<H>  4.0   |  25  |  0.78    Ca    8.3<L>      26 Dec 2023 07:06  Phos  3.5     12-26  Mg     2.00     12-26    TPro  6.2  /  Alb  3.6  /  TBili  0.6  /  DBili  x   /  AST  28  /  ALT  29  /  AlkPhos  79  12-26                               13.7   5.38  )-----------( 164      ( 26 Dec 2023 07:06 )             40.3     LIVER FUNCTIONS - ( 26 Dec 2023 07:06 )  Alb: 3.6 g/dL / Pro: 6.2 g/dL / ALK PHOS: 79 U/L / ALT: 29 U/L / AST: 28 U/L / GGT: x                   Urinalysis Basic - ( 26 Dec 2023 07:06 )    Color: x / Appearance: x / SG: x / pH: x  Gluc: 121 mg/dL / Ketone: x  / Bili: x / Urobili: x   Blood: x / Protein: x / Nitrite: x   Leuk Esterase: x / RBC: x / WBC x   Sq Epi: x / Non Sq Epi: x / Bacteria: x              MEDICATIONS  (STANDING):  acetaminophen     Tablet .. 650 milliGRAM(s) Oral every 6 hours  amLODIPine   Tablet 10 milliGRAM(s) Oral daily  atorvastatin 80 milliGRAM(s) Oral at bedtime  enoxaparin Injectable 40 milliGRAM(s) SubCutaneous every 24 hours  lidocaine   4% Patch 1 Patch Transdermal daily  tamsulosin 0.4 milliGRAM(s) Oral at bedtime    MEDICATIONS  (PRN):  oxyCODONE    IR 5 milliGRAM(s) Oral every 6 hours PRN Moderate Pain (4 - 6)  polyethylene glycol 3350 17 Gram(s) Oral daily PRN Constipation  zaleplon 5 milliGRAM(s) Oral at bedtime PRN Insomnia

## 2023-12-27 NOTE — DISCHARGE NOTE NURSING/CASE MANAGEMENT/SOCIAL WORK - NSDPFAC_GEN_ALL_CORE
[facility] [place] [facility]

## 2023-12-27 NOTE — CHART NOTE - NSCHARTNOTEFT_GEN_A_CORE
Patient c/o 8/10 leg pain after working with PT this afternoon.   He reports feeling of decreased sensation in his toes and bottom of foot. He feels that his R leg is more swollen then before all the way down to his feet and toes.   On exam, he is in no distress.   R thigh is soft to palpation, no significant eccymosis except for posterior thigh above right knee.   Motor exam is limited by pain. He is able to lift his leg off the bed just barely, and able to plantar flex and wiggle toes.   Edema is non pitting.   His DP/PT pulse is 2+ and his sensation is intact on exam.   Neuro exam otherwise grossly intact.   Suspect symptoms related to his thigh hematoma.   Will check CT R lower extremity for further evaluation and stat CBC.   Discussed case with surgery who will see patient tonight.   Discussed above events and plan with Dr. Dozier who is in agreement.

## 2023-12-27 NOTE — DISCHARGE NOTE NURSING/CASE MANAGEMENT/SOCIAL WORK - NSDCDMENAME_GEN_ALL_CORE_FT
[facility]  Former[facility]

## 2023-12-27 NOTE — PROGRESS NOTE ADULT - PROBLEM SELECTOR PLAN 1
- RLE anterior thigh hematoma s/p fall   - CT R hip showing hematoma of RLE , Ill-defined intramuscular hematoma in the anterior/lateral thigh.  - surgery evaluated pt for compartment syndrome, no evidence at this time. pulses intact/sensations intact   - pain control with tylenol and oxycodone prn, add cold compress  - lidocaine patch as needed   - serial exams for compartment syndrome   - PT recs KWAN  - Dispo: medically cleared for DC to rehab, f/u SW

## 2023-12-27 NOTE — DISCHARGE NOTE NURSING/CASE MANAGEMENT/SOCIAL WORK - PATIENT PORTAL LINK FT
You can access the FollowMyHealth Patient Portal offered by [facility] by registering at the following website: [email or web address] By joining [employer]’s FollowMyHealth portal, you will also be able to view your health information using other applications (apps) compatible with our system. You can access the FollowMyHealth Patient Portal offered by [facility] by registering at the following website: [email or web address]. By joining [employer]’s FollowMyHealth portal, you will also be able to view your health information using other applications (apps) compatible with our system.

## 2023-12-28 LAB
ANION GAP SERPL CALC-SCNC: 11 MMOL/L — SIGNIFICANT CHANGE UP (ref 7–14)
ANION GAP SERPL CALC-SCNC: 11 MMOL/L — SIGNIFICANT CHANGE UP (ref 7–14)
BUN SERPL-MCNC: 14 MG/DL — SIGNIFICANT CHANGE UP (ref 7–23)
BUN SERPL-MCNC: 14 MG/DL — SIGNIFICANT CHANGE UP (ref 7–23)
CALCIUM SERPL-MCNC: 8.1 MG/DL — LOW (ref 8.4–10.5)
CALCIUM SERPL-MCNC: 8.1 MG/DL — LOW (ref 8.4–10.5)
CHLORIDE SERPL-SCNC: 106 MMOL/L — SIGNIFICANT CHANGE UP (ref 98–107)
CHLORIDE SERPL-SCNC: 106 MMOL/L — SIGNIFICANT CHANGE UP (ref 98–107)
CO2 SERPL-SCNC: 24 MMOL/L — SIGNIFICANT CHANGE UP (ref 22–31)
CO2 SERPL-SCNC: 24 MMOL/L — SIGNIFICANT CHANGE UP (ref 22–31)
CREAT SERPL-MCNC: 0.76 MG/DL — SIGNIFICANT CHANGE UP (ref 0.5–1.3)
CREAT SERPL-MCNC: 0.76 MG/DL — SIGNIFICANT CHANGE UP (ref 0.5–1.3)
EGFR: 97 ML/MIN/1.73M2 — SIGNIFICANT CHANGE UP
EGFR: 97 ML/MIN/1.73M2 — SIGNIFICANT CHANGE UP
GLUCOSE SERPL-MCNC: 94 MG/DL — SIGNIFICANT CHANGE UP (ref 70–99)
GLUCOSE SERPL-MCNC: 94 MG/DL — SIGNIFICANT CHANGE UP (ref 70–99)
HCT VFR BLD CALC: 39.9 % — SIGNIFICANT CHANGE UP (ref 39–50)
HCT VFR BLD CALC: 39.9 % — SIGNIFICANT CHANGE UP (ref 39–50)
HGB BLD-MCNC: 13.2 G/DL — SIGNIFICANT CHANGE UP (ref 13–17)
HGB BLD-MCNC: 13.2 G/DL — SIGNIFICANT CHANGE UP (ref 13–17)
MAGNESIUM SERPL-MCNC: 1.9 MG/DL — SIGNIFICANT CHANGE UP (ref 1.6–2.6)
MAGNESIUM SERPL-MCNC: 1.9 MG/DL — SIGNIFICANT CHANGE UP (ref 1.6–2.6)
MCHC RBC-ENTMCNC: 29.8 PG — SIGNIFICANT CHANGE UP (ref 27–34)
MCHC RBC-ENTMCNC: 29.8 PG — SIGNIFICANT CHANGE UP (ref 27–34)
MCHC RBC-ENTMCNC: 33.1 GM/DL — SIGNIFICANT CHANGE UP (ref 32–36)
MCHC RBC-ENTMCNC: 33.1 GM/DL — SIGNIFICANT CHANGE UP (ref 32–36)
MCV RBC AUTO: 90.1 FL — SIGNIFICANT CHANGE UP (ref 80–100)
MCV RBC AUTO: 90.1 FL — SIGNIFICANT CHANGE UP (ref 80–100)
NRBC # BLD: 0 /100 WBCS — SIGNIFICANT CHANGE UP (ref 0–0)
NRBC # BLD: 0 /100 WBCS — SIGNIFICANT CHANGE UP (ref 0–0)
NRBC # FLD: 0 K/UL — SIGNIFICANT CHANGE UP (ref 0–0)
NRBC # FLD: 0 K/UL — SIGNIFICANT CHANGE UP (ref 0–0)
PHOSPHATE SERPL-MCNC: 3.9 MG/DL — SIGNIFICANT CHANGE UP (ref 2.5–4.5)
PHOSPHATE SERPL-MCNC: 3.9 MG/DL — SIGNIFICANT CHANGE UP (ref 2.5–4.5)
PLATELET # BLD AUTO: 162 K/UL — SIGNIFICANT CHANGE UP (ref 150–400)
PLATELET # BLD AUTO: 162 K/UL — SIGNIFICANT CHANGE UP (ref 150–400)
POTASSIUM SERPL-MCNC: 4.1 MMOL/L — SIGNIFICANT CHANGE UP (ref 3.5–5.3)
POTASSIUM SERPL-MCNC: 4.1 MMOL/L — SIGNIFICANT CHANGE UP (ref 3.5–5.3)
POTASSIUM SERPL-SCNC: 4.1 MMOL/L — SIGNIFICANT CHANGE UP (ref 3.5–5.3)
POTASSIUM SERPL-SCNC: 4.1 MMOL/L — SIGNIFICANT CHANGE UP (ref 3.5–5.3)
RBC # BLD: 4.43 M/UL — SIGNIFICANT CHANGE UP (ref 4.2–5.8)
RBC # BLD: 4.43 M/UL — SIGNIFICANT CHANGE UP (ref 4.2–5.8)
RBC # FLD: 13.4 % — SIGNIFICANT CHANGE UP (ref 10.3–14.5)
RBC # FLD: 13.4 % — SIGNIFICANT CHANGE UP (ref 10.3–14.5)
SODIUM SERPL-SCNC: 141 MMOL/L — SIGNIFICANT CHANGE UP (ref 135–145)
SODIUM SERPL-SCNC: 141 MMOL/L — SIGNIFICANT CHANGE UP (ref 135–145)
WBC # BLD: 5.31 K/UL — SIGNIFICANT CHANGE UP (ref 3.8–10.5)
WBC # BLD: 5.31 K/UL — SIGNIFICANT CHANGE UP (ref 3.8–10.5)
WBC # FLD AUTO: 5.31 K/UL — SIGNIFICANT CHANGE UP (ref 3.8–10.5)
WBC # FLD AUTO: 5.31 K/UL — SIGNIFICANT CHANGE UP (ref 3.8–10.5)

## 2023-12-28 PROCEDURE — 73706 CT ANGIO LWR EXTR W/O&W/DYE: CPT | Mod: 26,RT,52

## 2023-12-28 PROCEDURE — 99232 SBSQ HOSP IP/OBS MODERATE 35: CPT

## 2023-12-28 RX ORDER — POLYETHYLENE GLYCOL 3350 17 G/17G
17 POWDER, FOR SOLUTION ORAL DAILY
Refills: 0 | Status: DISCONTINUED | OUTPATIENT
Start: 2023-12-28 | End: 2024-01-05

## 2023-12-28 RX ADMIN — Medication 5 MILLIGRAM(S): at 00:29

## 2023-12-28 RX ADMIN — POLYETHYLENE GLYCOL 3350 17 GRAM(S): 17 POWDER, FOR SOLUTION ORAL at 12:58

## 2023-12-28 RX ADMIN — OXYCODONE HYDROCHLORIDE 5 MILLIGRAM(S): 5 TABLET ORAL at 05:33

## 2023-12-28 RX ADMIN — OXYCODONE HYDROCHLORIDE 5 MILLIGRAM(S): 5 TABLET ORAL at 06:40

## 2023-12-28 RX ADMIN — OXYCODONE HYDROCHLORIDE 5 MILLIGRAM(S): 5 TABLET ORAL at 00:29

## 2023-12-28 NOTE — PROGRESS NOTE ADULT - SUBJECTIVE AND OBJECTIVE BOX
Dr. Yenny Dozier  Pager [phone]    PROGRESS NOTE:     Patient is a 68y old  Male who presents with a chief complaint of Fall (28 Dec 2023 07:50)      SUBJECTIVE / OVERNIGHT EVENTS: denies chest pain or sob , overnight right thigh worsening pain, examined by vascular sx, exam stable, ordered CTA RLE  ADDITIONAL REVIEW OF SYSTEMS: afebrile     MEDICATIONS  (STANDING):  acetaminophen     Tablet .. 650 milliGRAM(s) Oral every 6 hours  amLODIPine   Tablet 10 milliGRAM(s) Oral daily  atorvastatin 80 milliGRAM(s) Oral at bedtime  lidocaine   4% Patch 1 Patch Transdermal daily  polyethylene glycol 3350 17 Gram(s) Oral daily  tamsulosin 0.4 milliGRAM(s) Oral at bedtime    MEDICATIONS  (PRN):  oxyCODONE    IR 5 milliGRAM(s) Oral every 6 hours PRN Moderate Pain (4 - 6)  zaleplon 5 milliGRAM(s) Oral at bedtime PRN Insomnia      CAPILLARY BLOOD GLUCOSE        I&O's Summary    27 Dec 2023 07:01  -  28 Dec 2023 07:00  --------------------------------------------------------  IN: 0 mL / OUT: 750 mL / NET: -750 mL        PHYSICAL EXAM:  Vital Signs Last 24 Hrs  T(C): 36.7 (28 Dec 2023 10:06), Max: 36.7 (28 Dec 2023 10:06)  T(F): 98.1 (28 Dec 2023 10:06), Max: 98.1 (28 Dec 2023 10:06)  HR: 77 (28 Dec 2023 10:06) (66 - 77)  BP: 128/62 (28 Dec 2023 10:06) (126/68 - 130/80)  BP(mean): --  RR: 17 (28 Dec 2023 10:06) (17 - 17)  SpO2: 98% (28 Dec 2023 10:06) (96% - 98%)    Parameters below as of 28 Dec 2023 10:06  Patient On (Oxygen Delivery Method): room air      CONSTITUTIONAL: NAD, well-developed  RESPIRATORY: Normal respiratory effort; lungs are clear to auscultation bilaterally  CARDIOVASCULAR: Regular rate and rhythm, normal S1 and S2, no murmur/rub/gallop; No lower extremity edema; Peripheral pulses are 2+ bilaterally  ABDOMEN: Nontender to palpation, normoactive bowel sounds, no rebound/guarding; No hepatosplenomegaly  MUSCULOSKELETAL:  right thigh swelling, ACE wrapped from foot to thigh, soft, mildly ttp  PSYCH: A+O to person, place, and time; affect appropriate    LABS:                        13.2   5.31  )-----------( 162      ( 28 Dec 2023 05:43 )             39.9     12-28    141  |  106  |  14  ----------------------------<  94  4.1   |  24  |  0.76    Ca    8.1<L>      28 Dec 2023 05:43  Phos  3.9     12-28  Mg     1.90     12-28        CARDIAC MARKERS ( 28 Dec 2023 05:43 )  x     / x     / 122 U/L / x     / x          Urinalysis Basic - ( 28 Dec 2023 05:43 )    Color: x / Appearance: x / SG: x / pH: x  Gluc: 94 mg/dL / Ketone: x  / Bili: x / Urobili: x   Blood: x / Protein: x / Nitrite: x   Leuk Esterase: x / RBC: x / WBC x   Sq Epi: x / Non Sq Epi: x / Bacteria: x      RADIOLOGY & ADDITIONAL TESTS:  Results Reviewed:   Imaging Personally Reviewed:    Electrocardiogram Personally Reviewed:    COORDINATION OF CARE:  Care Discussed with Consultants/Other Providers [Y/N]:  Prior or Outpatient Records Reviewed [Y/N]:   Dr. Yenny Dozier  Pager [phone]    PROGRESS NOTE:     Patient is a 68y old  Male who presents with a chief complaint of Fall (28 Dec 2023 07:50)      SUBJECTIVE / OVERNIGHT EVENTS: denies chest pain or sob , overnight right thigh worsening pain, examined by vascular sx, exam stable, ordered CTA RLE  ADDITIONAL REVIEW OF SYSTEMS: afebrile     MEDICATIONS  (STANDING):  acetaminophen     Tablet .. 650 milliGRAM(s) Oral every 6 hours  amLODIPine   Tablet 10 milliGRAM(s) Oral daily  atorvastatin 80 milliGRAM(s) Oral at bedtime  lidocaine   4% Patch 1 Patch Transdermal daily  polyethylene glycol 3350 17 Gram(s) Oral daily  tamsulosin 0.4 milliGRAM(s) Oral at bedtime    MEDICATIONS  (PRN):  oxyCODONE    IR 5 milliGRAM(s) Oral every 6 hours PRN Moderate Pain (4 - 6)  zaleplon 5 milliGRAM(s) Oral at bedtime PRN Insomnia      CAPILLARY BLOOD GLUCOSE        I&O's Summary    27 Dec 2023 07:01  -  28 Dec 2023 07:00  --------------------------------------------------------  IN: 0 mL / OUT: 750 mL / NET: -750 mL        PHYSICAL EXAM:  Vital Signs Last 24 Hrs  T(C): 36.7 (28 Dec 2023 10:06), Max: 36.7 (28 Dec 2023 10:06)  T(F): 98.1 (28 Dec 2023 10:06), Max: 98.1 (28 Dec 2023 10:06)  HR: 77 (28 Dec 2023 10:06) (66 - 77)  BP: 128/62 (28 Dec 2023 10:06) (126/68 - 130/80)  BP(mean): --  RR: 17 (28 Dec 2023 10:06) (17 - 17)  SpO2: 98% (28 Dec 2023 10:06) (96% - 98%)    Parameters below as of 28 Dec 2023 10:06  Patient On (Oxygen Delivery Method): room air      CONSTITUTIONAL: NAD, well-developed  RESPIRATORY: Normal respiratory effort; lungs are clear to auscultation bilaterally  CARDIOVASCULAR: Regular rate and rhythm, normal S1 and S2, no murmur/rub/gallop; No lower extremity edema; Peripheral pulses are 2+ bilaterally  ABDOMEN: Nontender to palpation, normoactive bowel sounds, no rebound/guarding; No hepatosplenomegaly  MUSCULOSKELETAL:  right thigh swelling, ACE wrapped from foot to thigh, soft, mildly ttp  PSYCH: A+O to person, place, and time; affect appropriate    LABS:                        13.2   5.31  )-----------( 162      ( 28 Dec 2023 05:43 )             39.9     12-28    141  |  106  |  14  ----------------------------<  94  4.1   |  24  |  0.76    Ca    8.1<L>      28 Dec 2023 05:43  Phos  3.9     12-28  Mg     1.90     12-28        CARDIAC MARKERS ( 28 Dec 2023 05:43 )  x     / x     / 122 U/L / x     / x          Urinalysis Basic - ( 28 Dec 2023 05:43 )    Color: x / Appearance: x / SG: x / pH: x  Gluc: 94 mg/dL / Ketone: x  / Bili: x / Urobili: x   Blood: x / Protein: x / Nitrite: x   Leuk Esterase: x / RBC: x / WBC x   Sq Epi: x / Non Sq Epi: x / Bacteria: x      RADIOLOGY & ADDITIONAL TESTS:  Results Reviewed:   Imaging Personally Reviewed:    Electrocardiogram Personally Reviewed:    COORDINATION OF CARE:  Care Discussed with Consultants/Other Providers [Y/N]:  Prior or Outpatient Records Reviewed [Y/N]:

## 2023-12-28 NOTE — PROGRESS NOTE ADULT - SUBJECTIVE AND OBJECTIVE BOX
SURGERY PROGRESS NOTE    SUBJECTIVE / 24H EVENTS:  Patient seen and examined on morning rounds. No acute events overnight. Patient reports improved pain       OBJECTIVE:  VITAL SIGNS:  T(C): 36.4 (12-28-23 @ 05:55), Max: 36.9 (12-27-23 @ 09:32)  HR: 66 (12-28-23 @ 05:55) (66 - 87)  BP: 130/65 (12-28-23 @ 05:55) (122/72 - 130/80)  RR: 17 (12-28-23 @ 05:55) (17 - 18)  SpO2: 96% (12-28-23 @ 05:55) (96% - 100%)  Daily     Daily       PHYSICAL EXAM:  Gen: NAD  Neuro: AOx3  LS: Respirations unlabored on RA  Extremities: RLE thigh anterior compartment firm, tender. medial and posterior compartments soft and nontender. no skin changes over thigh, mild ecchymosis at R knee and upper posterolateral calf. 5/5 motor strength with dorsi and planter flexion on L vs. 4/5 on R. Flexion at knee and ankle with limited 2/2 pain, no pain on passive flexion. Palpable R DP pulse        12-27-23 @ 07:01  -  12-28-23 @ 07:00  --------------------------------------------------------  IN:  Total IN: 0 mL    OUT:    Voided (mL): 750 mL  Total OUT: 750 mL    Total NET: -750 mL          LAB VALUES:                                   15.3   7.37  )-----------( 179      ( 27 Dec 2023 18:33 )             45.0             MEDICATIONS  (STANDING):  acetaminophen     Tablet .. 650 milliGRAM(s) Oral every 6 hours  amLODIPine   Tablet 10 milliGRAM(s) Oral daily  atorvastatin 80 milliGRAM(s) Oral at bedtime  lidocaine   4% Patch 1 Patch Transdermal daily  tamsulosin 0.4 milliGRAM(s) Oral at bedtime    MEDICATIONS  (PRN):  oxyCODONE    IR 5 milliGRAM(s) Oral every 6 hours PRN Moderate Pain (4 - 6)  polyethylene glycol 3350 17 Gram(s) Oral daily PRN Constipation  zaleplon 5 milliGRAM(s) Oral at bedtime PRN Insomnia

## 2023-12-28 NOTE — PROGRESS NOTE ADULT - ASSESSMENT
Assessment: 68 year old man with HTN, HLD, old dvt not on AC for 1 year with rle hematoma after fall. Surgery reconsulted for worsening pain. Swelling not noticeably different from prior exam. Pulses and motor/sensory intact with slight limitation secondary to pain, no pain out of proportion to exam or pain on passive flexion. H/H and exam stable this morning and pt reporting improved pain. Low suspicion for expanding hematoma or compartment syndrome. Pain likely 2/2 nerve irritation.     Recs:  - F/u repeat CK  - F/u CTA RLE to assess for active extravasation  - RLE wrapped from foot to thigh with Ace   - Pain control  - Please re-page if exam worsening    B Team Surgery   [id number] Assessment: 68 year old man with HTN, HLD, old dvt not on AC for 1 year with rle hematoma after fall. Surgery reconsulted for worsening pain. Swelling not noticeably different from prior exam. Pulses and motor/sensory intact with slight limitation secondary to pain, no pain out of proportion to exam or pain on passive flexion. H/H and exam stable this morning and pt reporting improved pain. Low suspicion for expanding hematoma or compartment syndrome. Pain likely 2/2 nerve irritation.     Recs:  - F/u repeat CK  - F/u CTA RLE to assess for active extravasation  - RLE wrapped from foot to thigh with Ace   - Pain control  - Please re-page if exam worsening    B Team Surgery   [id number]

## 2023-12-28 NOTE — CHART NOTE - NSCHARTNOTEFT_GEN_A_CORE
Came to the patient bedside for evaluation of RLE. Came to the patient bedside for evaluation of RLE. Patient states that his RLE pain and swelling is stable from earlier this afternoon. On exam the RLE extremity was wrapped in ACE. It was unwrapped for exam. RLE is swollen compared to LLE (stable). RLE is moderately tender in the upper vanna-lateral thigh, softer than in previous days, not tense. Some ecchymosis around posterior calf and medial knee (consistent with previous exam). Sensation intact, with stable mild diminished sensation compared to LLE, possibly due to swelling. Patient able to lift leg off [place] but limited due to pain, Dorsi/plantar flexion motor intact, and patient able to wiggle toes. Palpable right DP pulse.     Impression: Exam stable. No evident of compartment syndrome at this time.     B team surgery   [id number] Came to the patient bedside for evaluation of RLE. Patient states that his RLE pain and swelling is stable from earlier this afternoon. On exam the RLE extremity was wrapped in ACE. It was unwrapped for exam. RLE is swollen compared to LLE (stable). RLE is moderately tender in the upper vanna-lateral thigh, softer than in previous days, not tense. Some ecchymosis around posterior calf and medial knee (consistent with previous exam). Sensation intact, with stable mild diminished sensation compared to LLE, possibly due to swelling. Patient able to lift leg off mar[place] but limited due to pain, Dorsi/plantar flexion motor intact, and patient able to wiggle toes. Palpable right DP pulse.     Impression: Exam stable. No evident of compartment syndrome at this time.     B team surgery   [id number] Came to the patient bedside for evaluation of RLE. Patient states that his RLE pain and swelling is stable from earlier this afternoon. On exam the RLE extremity was wrapped in ACE. It was unwrapped for exam. RLE is swollen compared to LLE (stable). RLE is moderately tender in the upper vanna-lateral thigh, softer than in previous days, not tense. Some ecchymosis around posterior calf and medial knee (consistent with previous exam). Sensation intact, with stable mild diminished sensation compared to LLE, possibly due to swelling. Patient able to lift leg off marb[place] but limited due to pain, Dorsi/plantar flexion motor intact, and patient able to wiggle toes. Palpable right DP pulse.     Impression: Exam stable. No evidence of compartment syndrome at this time.     B team surgery   [id number] Came to the patient bedside for evaluation of RLE. Patient states that his RLE pain and swelling is stable from earlier this afternoon. On exam the RLE extremity was wrapped in ACE. It was unwrapped for exam. RLE is swollen compared to LLE (stable). RLE is moderately tender in the upper vanna-lateral thigh, softer than in previous days, not tense. Some ecchymosis around posterior calf and medial knee (consistent with previous exam). Sensation intact, with stable mild diminished sensation compared to LLE, possibly due to swelling. Patient able to lift leg off marb[place] but limited due to pain, Dorsi/plantar flexion motor intact, and patient able to wiggle toes. Palpable right DP pulse.     Impression: Exam stable. No evidence of compartment syndrome at this time.     B team surgery   [id number] Came to the patient bedside for evaluation of RLE. Patient states that his RLE pain and swelling is stable from earlier this afternoon. On exam the RLE extremity was wrapped in ACE. It was unwrapped for exam. RLE is swollen compared to LLE (stable). RLE is moderately tender in the upper vanna-lateral thigh, softer than in previous days, not tense. Medial and posterior compartments soft, nontender. Some ecchymosis around posterior calf and medial knee (consistent with previous exam). Sensation intact, with stable mild diminished sensation compared to LLE, possibly due to swelling. Patient able to lift leg off marbella but limited due to pain, Dorsi/plantar flexion motor intact, and patient able to wiggle toes. Palpable right DP pulse.     Impression: Exam stable. No evidence of compartment syndrome at this time.     B team surgery   [id number] Came to the patient bedside for evaluation of RLE. Patient states that his RLE pain and swelling is stable from earlier this afternoon. On exam the RLE extremity was wrapped in ACE. It was unwrapped for exam. RLE is swollen compared to LLE (stable). RLE is moderately tender in the upper vanna-lateral thigh, softer than in previous days, not tense. Medial and posterior compartments soft, nontender. Some ecchymosis around posterior calf and medial knee (consistent with previous exam). Sensation intact, with stable mild diminished sensation compared to LLE, possibly due to swelling. Patient able to lift leg off marb[place] but limited due to pain, Dorsi/plantar flexion motor intact, and patient able to wiggle toes. Palpable right DP pulse.     Impression: Exam stable. No evidence of compartment syndrome at this time.     B team surgery   [id number]

## 2023-12-28 NOTE — PROGRESS NOTE ADULT - PROBLEM SELECTOR PLAN 1
- RLE anterior thigh hematoma s/p fall   - CT R hip showing hematoma of RLE , Ill-defined intramuscular hematoma in the anterior/lateral thigh.  - surgery evaluated pt for compartment syndrome, no evidence at this time. pulses intact/sensations intact   - pain control with tylenol and oxycodone prn, ACE wrapped from foot to right thigh  - vascular surgery following, no compartment syndrome, CTA RLE ordered to r/o active extravasation. I have low suspicion. cpk wnl 122  - lidocaine patch, tylenol/oxycodone prn for pain control  - serial exams for compartment syndrome   - PT recs KWAN  - Dispo: medically cleared for DC to rehab pending CTA RLE, f/u SW

## 2023-12-28 NOTE — CHART NOTE - NSCHARTNOTEFT_GEN_A_CORE
Prelim CTA RLE noted: Multifocal areas of high density in the anterior/lateral   thigh musculature compatible with intramuscular hematoma without evidence   of active bleed. Follow-up official report.    Reviewed findings with B team surgery - no change in management at this time.  Follow up final report.   Patient currently stable, will continue to monitor.

## 2023-12-29 LAB
ANION GAP SERPL CALC-SCNC: 11 MMOL/L — SIGNIFICANT CHANGE UP (ref 7–14)
ANION GAP SERPL CALC-SCNC: 11 MMOL/L — SIGNIFICANT CHANGE UP (ref 7–14)
BUN SERPL-MCNC: 16 MG/DL — SIGNIFICANT CHANGE UP (ref 7–23)
BUN SERPL-MCNC: 16 MG/DL — SIGNIFICANT CHANGE UP (ref 7–23)
CALCIUM SERPL-MCNC: 8.5 MG/DL — SIGNIFICANT CHANGE UP (ref 8.4–10.5)
CALCIUM SERPL-MCNC: 8.5 MG/DL — SIGNIFICANT CHANGE UP (ref 8.4–10.5)
CHLORIDE SERPL-SCNC: 105 MMOL/L — SIGNIFICANT CHANGE UP (ref 98–107)
CHLORIDE SERPL-SCNC: 105 MMOL/L — SIGNIFICANT CHANGE UP (ref 98–107)
CK SERPL-CCNC: 90 U/L — SIGNIFICANT CHANGE UP (ref 30–200)
CK SERPL-CCNC: 90 U/L — SIGNIFICANT CHANGE UP (ref 30–200)
CO2 SERPL-SCNC: 25 MMOL/L — SIGNIFICANT CHANGE UP (ref 22–31)
CO2 SERPL-SCNC: 25 MMOL/L — SIGNIFICANT CHANGE UP (ref 22–31)
CREAT SERPL-MCNC: 0.81 MG/DL — SIGNIFICANT CHANGE UP (ref 0.5–1.3)
CREAT SERPL-MCNC: 0.81 MG/DL — SIGNIFICANT CHANGE UP (ref 0.5–1.3)
EGFR: 95 ML/MIN/1.73M2 — SIGNIFICANT CHANGE UP
EGFR: 95 ML/MIN/1.73M2 — SIGNIFICANT CHANGE UP
GLUCOSE SERPL-MCNC: 114 MG/DL — HIGH (ref 70–99)
GLUCOSE SERPL-MCNC: 114 MG/DL — HIGH (ref 70–99)
HCT VFR BLD CALC: 40.8 % — SIGNIFICANT CHANGE UP (ref 39–50)
HCT VFR BLD CALC: 40.8 % — SIGNIFICANT CHANGE UP (ref 39–50)
HGB BLD-MCNC: 13.4 G/DL — SIGNIFICANT CHANGE UP (ref 13–17)
HGB BLD-MCNC: 13.4 G/DL — SIGNIFICANT CHANGE UP (ref 13–17)
MCHC RBC-ENTMCNC: 29.6 PG — SIGNIFICANT CHANGE UP (ref 27–34)
MCHC RBC-ENTMCNC: 29.6 PG — SIGNIFICANT CHANGE UP (ref 27–34)
MCHC RBC-ENTMCNC: 32.8 GM/DL — SIGNIFICANT CHANGE UP (ref 32–36)
MCHC RBC-ENTMCNC: 32.8 GM/DL — SIGNIFICANT CHANGE UP (ref 32–36)
MCV RBC AUTO: 90.3 FL — SIGNIFICANT CHANGE UP (ref 80–100)
MCV RBC AUTO: 90.3 FL — SIGNIFICANT CHANGE UP (ref 80–100)
NRBC # BLD: 0 /100 WBCS — SIGNIFICANT CHANGE UP (ref 0–0)
NRBC # BLD: 0 /100 WBCS — SIGNIFICANT CHANGE UP (ref 0–0)
NRBC # FLD: 0 K/UL — SIGNIFICANT CHANGE UP (ref 0–0)
NRBC # FLD: 0 K/UL — SIGNIFICANT CHANGE UP (ref 0–0)
PLATELET # BLD AUTO: 160 K/UL — SIGNIFICANT CHANGE UP (ref 150–400)
PLATELET # BLD AUTO: 160 K/UL — SIGNIFICANT CHANGE UP (ref 150–400)
POTASSIUM SERPL-MCNC: 4.3 MMOL/L — SIGNIFICANT CHANGE UP (ref 3.5–5.3)
POTASSIUM SERPL-MCNC: 4.3 MMOL/L — SIGNIFICANT CHANGE UP (ref 3.5–5.3)
POTASSIUM SERPL-SCNC: 4.3 MMOL/L — SIGNIFICANT CHANGE UP (ref 3.5–5.3)
POTASSIUM SERPL-SCNC: 4.3 MMOL/L — SIGNIFICANT CHANGE UP (ref 3.5–5.3)
RBC # BLD: 4.52 M/UL — SIGNIFICANT CHANGE UP (ref 4.2–5.8)
RBC # BLD: 4.52 M/UL — SIGNIFICANT CHANGE UP (ref 4.2–5.8)
RBC # FLD: 13.6 % — SIGNIFICANT CHANGE UP (ref 10.3–14.5)
RBC # FLD: 13.6 % — SIGNIFICANT CHANGE UP (ref 10.3–14.5)
SODIUM SERPL-SCNC: 141 MMOL/L — SIGNIFICANT CHANGE UP (ref 135–145)
SODIUM SERPL-SCNC: 141 MMOL/L — SIGNIFICANT CHANGE UP (ref 135–145)
WBC # BLD: 5.99 K/UL — SIGNIFICANT CHANGE UP (ref 3.8–10.5)
WBC # BLD: 5.99 K/UL — SIGNIFICANT CHANGE UP (ref 3.8–10.5)
WBC # FLD AUTO: 5.99 K/UL — SIGNIFICANT CHANGE UP (ref 3.8–10.5)
WBC # FLD AUTO: 5.99 K/UL — SIGNIFICANT CHANGE UP (ref 3.8–10.5)

## 2023-12-29 PROCEDURE — 99232 SBSQ HOSP IP/OBS MODERATE 35: CPT

## 2023-12-29 RX ORDER — ENOXAPARIN SODIUM 100 MG/ML
40 INJECTION SUBCUTANEOUS EVERY 24 HOURS
Refills: 0 | Status: DISCONTINUED | OUTPATIENT
Start: 2023-12-29 | End: 2024-01-05

## 2023-12-29 RX ADMIN — ENOXAPARIN SODIUM 40 MILLIGRAM(S): 100 INJECTION SUBCUTANEOUS at 19:22

## 2023-12-29 NOTE — PROGRESS NOTE ADULT - SUBJECTIVE AND OBJECTIVE BOX
Dr. Yenny Dozier  Pager [phone]    PROGRESS NOTE:     Patient is a 68y old  Male who presents with a chief complaint of Fall (28 Dec 2023 14:15)      SUBJECTIVE / OVERNIGHT EVENTS: denies chest pain or sob   ADDITIONAL REVIEW OF SYSTEMS: afebrile     MEDICATIONS  (STANDING):  acetaminophen     Tablet .. 650 milliGRAM(s) Oral every 6 hours  amLODIPine   Tablet 10 milliGRAM(s) Oral daily  atorvastatin 80 milliGRAM(s) Oral at bedtime  lidocaine   4% Patch 1 Patch Transdermal daily  polyethylene glycol 3350 17 Gram(s) Oral daily  tamsulosin 0.4 milliGRAM(s) Oral at bedtime    MEDICATIONS  (PRN):  oxyCODONE    IR 5 milliGRAM(s) Oral every 6 hours PRN Moderate Pain (4 - 6)  zaleplon 5 milliGRAM(s) Oral at bedtime PRN Insomnia      CAPILLARY BLOOD GLUCOSE        I&O's Summary    28 Dec 2023 07:01  -  29 Dec 2023 07:00  --------------------------------------------------------  IN: 0 mL / OUT: 250 mL / NET: -250 mL    29 Dec 2023 07:01  -  29 Dec 2023 15:40  --------------------------------------------------------  IN: 0 mL / OUT: 250 mL / NET: -250 mL        PHYSICAL EXAM:  Vital Signs Last 24 Hrs  T(C): 36.7 (29 Dec 2023 09:00), Max: 36.8 (28 Dec 2023 18:15)  T(F): 98 (29 Dec 2023 09:00), Max: 98.3 (28 Dec 2023 18:15)  HR: 85 (29 Dec 2023 09:00) (64 - 85)  BP: 136/69 (29 Dec 2023 09:00) (111/60 - 136/69)  BP(mean): --  RR: 19 (29 Dec 2023 09:00) (17 - 19)  SpO2: 95% (29 Dec 2023 09:00) (94% - 97%)    Parameters below as of 29 Dec 2023 09:00  Patient On (Oxygen Delivery Method): room air      CONSTITUTIONAL: NAD, well-developed  RESPIRATORY: Normal respiratory effort; lungs are clear to auscultation bilaterally  CARDIOVASCULAR: Regular rate and rhythm, normal S1 and S2, no murmur/rub/gallop; No lower extremity edema; Peripheral pulses are 2+ bilaterally  ABDOMEN: Nontender to palpation, normoactive bowel sounds, no rebound/guarding; No hepatosplenomegaly  MUSCULOSKELETAL:  right thigh swelling, ACE wrapped from foot to thigh, soft, mildly ttp  PSYCH: A+O to person, place, and time; affect appropriate      LABS:                        13.4   5.99  )-----------( 160      ( 29 Dec 2023 05:25 )             40.8     12-29    141  |  105  |  16  ----------------------------<  114<H>  4.3   |  25  |  0.81    Ca    8.5      29 Dec 2023 05:25  Phos  3.9     12-28  Mg     1.90     12-28        CARDIAC MARKERS ( 29 Dec 2023 05:25 )  x     / x     / 90 U/L / x     / x      CARDIAC MARKERS ( 28 Dec 2023 05:43 )  x     / x     / 122 U/L / x     / x          Urinalysis Basic - ( 29 Dec 2023 05:25 )    Color: x / Appearance: x / SG: x / pH: x  Gluc: 114 mg/dL / Ketone: x  / Bili: x / Urobili: x   Blood: x / Protein: x / Nitrite: x   Leuk Esterase: x / RBC: x / WBC x   Sq Epi: x / Non Sq Epi: x / Bacteria: x          RADIOLOGY & ADDITIONAL TESTS:  Results Reviewed:   Imaging Personally Reviewed:  < from: CT Angio Lower Extremity w/ IV Cont, Right (12.28.23 @ 17:20) >    IMPRESSION:  Intramuscular hematoma in the right anterior and lateral thigh   musculature as described without active hemorrhage. Patent right lower   extremity arterial vasculature.        Electrocardiogram Personally Reviewed:    COORDINATION OF CARE:  Care Discussed with Consultants/Other Providers [Y/N]:  Prior or Outpatient Records Reviewed [Y/N]:   Dr. Yenny Dozier  Pager [phone]    PROGRESS NOTE:     Patient is a 68y old  Male who presents with a chief complaint of Fall (28 Dec 2023 14:15)      SUBJECTIVE / OVERNIGHT EVENTS: denies chest pain or sob   ADDITIONAL REVIEW OF SYSTEMS: afebrile     MEDICATIONS  (STANDING):  acetaminophen     Tablet .. 650 milliGRAM(s) Oral every 6 hours  amLODIPine   Tablet 10 milliGRAM(s) Oral daily  atorvastatin 80 milliGRAM(s) Oral at bedtime  lidocaine   4% Patch 1 Patch Transdermal daily  polyethylene glycol 3350 17 Gram(s) Oral daily  tamsulosin 0.4 milliGRAM(s) Oral at bedtime    MEDICATIONS  (PRN):  oxyCODONE    IR 5 milliGRAM(s) Oral every 6 hours PRN Moderate Pain (4 - 6)  zaleplon 5 milliGRAM(s) Oral at bedtime PRN Insomnia      CAPILLARY BLOOD GLUCOSE        I&O's Summary    28 Dec 2023 07:01  -  29 Dec 2023 07:00  --------------------------------------------------------  IN: 0 mL / OUT: 250 mL / NET: -250 mL    29 Dec 2023 07:01  -  29 Dec 2023 15:40  --------------------------------------------------------  IN: 0 mL / OUT: 250 mL / NET: -250 mL        PHYSICAL EXAM:  Vital Signs Last 24 Hrs  T(C): 36.7 (29 Dec 2023 09:00), Max: 36.8 (28 Dec 2023 18:15)  T(F): 98 (29 Dec 2023 09:00), Max: 98.3 (28 Dec 2023 18:15)  HR: 85 (29 Dec 2023 09:00) (64 - 85)  BP: 136/69 (29 Dec 2023 09:00) (111/60 - 136/69)  BP(mean): --  RR: 19 (29 Dec 2023 09:00) (17 - 19)  SpO2: 95% (29 Dec 2023 09:00) (94% - 97%)    Parameters below as of 29 Dec 2023 09:00  Patient On (Oxygen Delivery Method): room air      CONSTITUTIONAL: NAD, well-developed  RESPIRATORY: Normal respiratory effort; lungs are clear to auscultation bilaterally  CARDIOVASCULAR: Regular rate and rhythm, normal S1 and S2, no murmur/rub/gallop; No lower extremity edema; Peripheral pulses are 2+ bilaterally  ABDOMEN: Nontender to palpation, normoactive bowel sounds, no rebound/guarding; No hepatosplenomegaly  MUSCULOSKELETAL:  right thigh swelling, ACE wrapped from foot to thigh, soft, mildly ttp  PSYCH: A+O to person, place, and time; affect appropriate      LABS:                        13.4   5.99  )-----------( 160      ( 29 Dec 2023 05:25 )             40.8     12-29    141  |  105  |  16  ----------------------------<  114<H>  4.3   |  25  |  0.81    Ca    8.5      29 Dec 2023 05:25  Phos  3.9     12-28  Mg     1.90     12-28        CARDIAC MARKERS ( 29 Dec 2023 05:25 )  x     / x     / 90 U/L / x     / x      CARDIAC MARKERS ( 28 Dec 2023 05:43 )  x     / x     / 122 U/L / x     / x          Urinalysis Basic - ( 29 Dec 2023 05:25 )    Color: x / Appearance: x / SG: x / pH: x  Gluc: 114 mg/dL / Ketone: x  / Bili: x / Urobili: x   Blood: x / Protein: x / Nitrite: x   Leuk Esterase: x / RBC: x / WBC x   Sq Epi: x / Non Sq Epi: x / Bacteria: x          RADIOLOGY & ADDITIONAL TESTS:  Results Reviewed:   Imaging Personally Reviewed:  < from: CT Angio Lower Extremity w/ IV Cont, Right (12.28.23 @ 17:20) >    IMPRESSION:  Intramuscular hematoma in the right anterior and lateral thigh   musculature as described without active hemorrhage. Patent right lower   extremity arterial vasculature.        Electrocardiogram Personally Reviewed:    COORDINATION OF CARE:  Care Discussed with Consultants/Other Providers [Y/N]:  Prior or Outpatient Records Reviewed [Y/N]:

## 2023-12-29 NOTE — DIETITIAN INITIAL EVALUATION ADULT - PERTINENT LABORATORY DATA
12-29    141  |  105  |  16  ----------------------------<  114<H>  4.3   |  25  |  0.81    Ca    8.5      29 Dec 2023 05:25  Phos  3.9     12-28  Mg     1.90     12-28    A1C with Estimated Average Glucose Result: 6.0 % (04-16-23 @ 04:55)  A1C with Estimated Average Glucose Result: 6.3 % (04-05-23 @ 12:08)

## 2023-12-29 NOTE — DIETITIAN INITIAL EVALUATION ADULT - ADD RECOMMEND
1. Continue present diet order as it remains appropriate at this time.   2. Nursing to document PO in RN flow sheets and monitor weekly weights.  3. Continue to monitor skin integrity, bowel regimen, and nutrition pertinent labs.

## 2023-12-29 NOTE — DIETITIAN INITIAL EVALUATION ADULT - NAME AND PHONE
Tamara Rahman MS RD CDN #[id number], available on [employer].  Tamara Rahman MS RD CDN #[id number], available on [employer].

## 2023-12-29 NOTE — DIETITIAN INITIAL EVALUATION ADULT - FACTORS AFF FOOD INTAKE
Quaker/ethnic/cultural/personal food preferences Rastafarian/ethnic/cultural/personal food preferences

## 2023-12-29 NOTE — DIETITIAN INITIAL EVALUATION ADULT - NUTRITIONGOAL OUTCOME1
Patient will follow a healthy eating pattern to promote a lifestyle change with gradual weight loss.

## 2023-12-29 NOTE — DIETITIAN INITIAL EVALUATION ADULT - NSICDXPASTSURGICALHX_GEN_ALL_CORE_FT
PAST SURGICAL HISTORY:  H/O prostate biopsy     History of umbilical hernia repair x2    History of weight loss surgery 5 years ago in [place]    S/P shoulder surgery left shoulder    S/P UPPP (uvulopalatopharyngoplasty) >5 years ago     PAST SURGICAL HISTORY:  H/O prostate biopsy     History of umbilical hernia repair x2    History of weight loss surgery 5 years ago in [place]    S/P shoulder surgery left shoulder    S/P UPPP (uvulopalatopharyngoplasty) >5 years ago

## 2023-12-29 NOTE — PROGRESS NOTE ADULT - PROBLEM SELECTOR PLAN 1
- RLE anterior thigh hematoma s/p fall   - CT R hip showing hematoma of RLE , Ill-defined intramuscular hematoma in the anterior/lateral thigh.  - surgery evaluated pt for compartment syndrome, no evidence at this time. pulses intact/sensations intact   - pain control with tylenol and oxycodone prn, ACE wrapped from foot to right thigh  - vascular surgery following, no compartment syndrome, CTA RLE intramuscular hematoma in the right anterior and lateral thigh, but no active extravasation, no active bleed  - lidocaine patch, tylenol/oxycodone prn for pain control  - serial exams for compartment syndrome   - PT recs KWAN  - Dispo: medically cleared for DC to rehab, f/u SW

## 2023-12-29 NOTE — DIETITIAN INITIAL EVALUATION ADULT - REASON FOR ADMISSION
Pain of right hip.   Per 12/29/23 hospitalist attending chart review, Patient is a 68 year old male with pmh HTN, HLD, prev DVT, history of lumbar laminectomy in April 2023, bariatric surgery 15 years ago presenting after a fall at work, fell on his right side. Patient denied any dizziness prior to fall, denied any head trauma, no LOC, reports pain at R anterior thigh. Xray of RLE showed no fractures, CT R hip hematoma. VSS, labs were unremarkable. Admitted for management of thigh hematoma and fall.

## 2023-12-29 NOTE — DIETITIAN INITIAL EVALUATION ADULT - OTHER INFO
Patient is a American/English speaking male, A&O x 4 at baseline.  Seen for Length of stay.  Patient able to understand and speak simple English, prefers to receive interpretation service #[id number] for interview today.  Patient is tolerating a PO diet of DASH/TLC w/o chewing/swallowing difficulty.  Appetite/ PO intake noted is good, no changes per Patient.  Food and fluid preferences discussed; dislike salad.  No reported N/V/D/C, on bowel regimen, last BM reported ( 12/27 ) per pt reported.  Skin noted 2+ edema on R leg, no pressure injury per RN flowsheets.    UBW reported~ 220-250 lbs, no current weight to assess.  Patient feels like losing 10 lbs lately however not intentional.  Per [facility] weight history: 97.5kg (7-21), 97.5kg (6-16), height 63 inch, BMI 37.9, obesity.  Weight ranging 90.7kg in 1573-2069.  Patient desires to lose weights with h/o bariatric surgery.  Patient encouraged to follow healthy eating pattern via MyPlate method, increase physical activities when he is physically able s/p fx at this time.   Encouraged fruit and vegetables consumption (not salad) daily.  Labs ( 12/29 ) reviewed electrolytes all WNL.  Pt is not meeting criteria for malnutrition at this time.  Patient is a Maltese/English speaking male, A&O x 4 at baseline.  Seen for Length of stay.  Patient able to understand and speak simple English, prefers to receive interpretation service #[id number] for interview today.  Patient is tolerating a PO diet of DASH/TLC w/o chewing/swallowing difficulty.  Appetite/ PO intake noted is good, no changes per Patient.  Food and fluid preferences discussed; dislike salad.  No reported N/V/D/C, on bowel regimen, last BM reported ( 12/27 ) per pt reported.  Skin noted 2+ edema on R leg, no pressure injury per RN flowsheets.    UBW reported~ 220-250 lbs, no current weight to assess.  Patient feels like losing 10 lbs lately however not intentional.  Per [facility] weight history: 97.5kg (7-21), 97.5kg (6-16), height 63 inch, BMI 37.9, obesity.  Weight ranging 90.7kg in 6352-1803.  Patient desires to lose weights with h/o bariatric surgery.  Patient encouraged to follow healthy eating pattern via MyPlate method, increase physical activities when he is physically able s/p fx at this time.   Encouraged fruit and vegetables consumption (not salad) daily.  Labs ( 12/29 ) reviewed electrolytes all WNL.  Pt is not meeting criteria for malnutrition at this time.

## 2023-12-29 NOTE — DIETITIAN INITIAL EVALUATION ADULT - PERTINENT MEDS FT
MEDICATIONS  (STANDING):  acetaminophen     Tablet .. 650 milliGRAM(s) Oral every 6 hours  amLODIPine   Tablet 10 milliGRAM(s) Oral daily  atorvastatin 80 milliGRAM(s) Oral at bedtime  lidocaine   4% Patch 1 Patch Transdermal daily  polyethylene glycol 3350 17 Gram(s) Oral daily  tamsulosin 0.4 milliGRAM(s) Oral at bedtime    MEDICATIONS  (PRN):  oxyCODONE    IR 5 milliGRAM(s) Oral every 6 hours PRN Moderate Pain (4 - 6)  zaleplon 5 milliGRAM(s) Oral at bedtime PRN Insomnia

## 2023-12-30 PROCEDURE — 99232 SBSQ HOSP IP/OBS MODERATE 35: CPT

## 2023-12-30 RX ADMIN — POLYETHYLENE GLYCOL 3350 17 GRAM(S): 17 POWDER, FOR SOLUTION ORAL at 12:15

## 2023-12-30 RX ADMIN — ENOXAPARIN SODIUM 40 MILLIGRAM(S): 100 INJECTION SUBCUTANEOUS at 19:43

## 2023-12-31 PROCEDURE — 99232 SBSQ HOSP IP/OBS MODERATE 35: CPT

## 2023-12-31 RX ORDER — KETOROLAC TROMETHAMINE 30 MG/ML
15 SYRINGE (ML) INJECTION EVERY 6 HOURS
Refills: 0 | Status: DISCONTINUED | OUTPATIENT
Start: 2023-12-31 | End: 2024-01-02

## 2023-12-31 RX ORDER — SODIUM CHLORIDE 9 MG/ML
1000 INJECTION INTRAMUSCULAR; INTRAVENOUS; SUBCUTANEOUS
Refills: 0 | Status: DISCONTINUED | OUTPATIENT
Start: 2023-12-31 | End: 2024-01-05

## 2023-12-31 RX ADMIN — SODIUM CHLORIDE 100 MILLILITER(S): 9 INJECTION INTRAMUSCULAR; INTRAVENOUS; SUBCUTANEOUS at 09:48

## 2023-12-31 RX ADMIN — Medication 15 MILLIGRAM(S): at 19:05

## 2023-12-31 RX ADMIN — Medication 15 MILLIGRAM(S): at 18:49

## 2023-12-31 RX ADMIN — Medication 15 MILLIGRAM(S): at 13:04

## 2023-12-31 RX ADMIN — ENOXAPARIN SODIUM 40 MILLIGRAM(S): 100 INJECTION SUBCUTANEOUS at 18:50

## 2023-12-31 RX ADMIN — Medication 15 MILLIGRAM(S): at 13:19

## 2023-12-31 NOTE — PROVIDER CONTACT NOTE (OTHER) - ASSESSMENT
R ankle red and painful. neurovascular status is good. +pulses. no numbness or tingling. dorsi/plantar flexion strong.

## 2023-12-31 NOTE — PROGRESS NOTE ADULT - PROBLEM SELECTOR PLAN 1
- RLE anterior thigh hematoma s/p fall   - CT R hip showing hematoma of RLE , Ill-defined intramuscular hematoma in the anterior/lateral thigh.  - surgery evaluated pt for compartment syndrome, no evidence at this time. pulses intact/sensations intact   - pain control with tylenol and oxycodone prn, ACE wrapped from foot to right thigh  - vascular surgery following, no compartment syndrome, CTA RLE intramuscular hematoma in the right anterior and lateral thigh, but no active extravasation, no active bleed  - lidocaine patch, tylenol/oxycodone prn for pain control  - serial exams for compartment syndrome   - right ankle pain today, check uric acid, ?gout attack, trial of iv toradol, check leg doppler  - PT recs KWAN  - Dispo: medically cleared for DC to rehab, f/u SW - RLE anterior thigh hematoma s/p fall   - CT R hip showing hematoma of RLE , Ill-defined intramuscular hematoma in the anterior/lateral thigh.  - surgery evaluated pt for compartment syndrome, no evidence at this time. pulses intact/sensations intact   - pain control with tylenol and oxycodone prn, ACE wrapped from foot to right thigh  - vascular surgery following, no compartment syndrome, CTA RLE intramuscular hematoma in the right anterior and lateral thigh, but no active extravasation, no active bleed  - lidocaine patch, tylenol/oxycodone prn for pain control  - serial exams for compartment syndrome   - right ankle pain today, check uric acid, ?gout attack, trial of iv toradol, check right ankle xray and Leg duplex to r/o DVT  - PT recs KWAN  - Dispo: medically cleared for DC to rehab, f/u SW

## 2023-12-31 NOTE — PROGRESS NOTE ADULT - SUBJECTIVE AND OBJECTIVE BOX
Dr. Yenny Dozier  Pager [phone]    PROGRESS NOTE:     Patient is a 68y old  Male who presents with a chief complaint of Pain of right hip.   Per 12/29/23 hospitalist attending chart review, Patient is a 68 year old male with pmh HTN, HLD, prev DVT, history of lumbar laminectomy in April 2023, bariatric surgery 15 years ago presenting after a fall at work, fell on his right side. Patient denied any dizziness prior to fall, denied any head trauma, no LOC, reports pain at R anterior thigh. Xray of RLE showed no fractures, CT R hip hematoma. VSS, labs were unremarkable. Admitted for management of thigh hematoma and fall.    (29 Dec 2023 16:33)      SUBJECTIVE / OVERNIGHT EVENTS: denies chest pain or sob  ADDITIONAL REVIEW OF SYSTEMS: c/o right medial ankle pain and still right thigh pain, unable to ambulate    MEDICATIONS  (STANDING):  acetaminophen     Tablet .. 650 milliGRAM(s) Oral every 6 hours  amLODIPine   Tablet 10 milliGRAM(s) Oral daily  atorvastatin 80 milliGRAM(s) Oral at bedtime  enoxaparin Injectable 40 milliGRAM(s) SubCutaneous every 24 hours  ketorolac   Injectable 15 milliGRAM(s) IV Push every 6 hours  lidocaine   4% Patch 1 Patch Transdermal daily  polyethylene glycol 3350 17 Gram(s) Oral daily  sodium chloride 0.9%. 1000 milliLiter(s) (100 mL/Hr) IV Continuous <Continuous>  tamsulosin 0.4 milliGRAM(s) Oral at bedtime    MEDICATIONS  (PRN):  oxyCODONE    IR 5 milliGRAM(s) Oral every 6 hours PRN Moderate Pain (4 - 6)  zaleplon 5 milliGRAM(s) Oral at bedtime PRN Insomnia      CAPILLARY BLOOD GLUCOSE        I&O's Summary    30 Dec 2023 07:01  -  31 Dec 2023 07:00  --------------------------------------------------------  IN: 0 mL / OUT: 850 mL / NET: -850 mL        PHYSICAL EXAM:  Vital Signs Last 24 Hrs  T(C): 36.6 (31 Dec 2023 10:03), Max: 36.6 (30 Dec 2023 17:36)  T(F): 97.8 (31 Dec 2023 10:03), Max: 97.9 (30 Dec 2023 17:36)  HR: 75 (31 Dec 2023 10:03) (72 - 81)  BP: 123/62 (31 Dec 2023 10:03) (118/60 - 138/70)  BP(mean): --  RR: 17 (31 Dec 2023 10:03) (16 - 18)  SpO2: 96% (31 Dec 2023 10:03) (95% - 97%)    Parameters below as of 31 Dec 2023 10:03  Patient On (Oxygen Delivery Method): room air      CONSTITUTIONAL: NAD, well-developed  RESPIRATORY: Normal respiratory effort; lungs are clear to auscultation bilaterally  CARDIOVASCULAR: Regular rate and rhythm, normal S1 and S2, no murmur/rub/gallop; No lower extremity edema; Peripheral pulses are 2+ bilaterally  ABDOMEN: Nontender to palpation, normoactive bowel sounds, no rebound/guarding; No hepatosplenomegaly  MUSCULOSKELETAL: no clubbing or cyanosis of digits; no joint swelling or tenderness to palpation  PSYCH: A+O to person, place, and time; affect appropriate    LABS:                      RADIOLOGY & ADDITIONAL TESTS:  Results Reviewed:   Imaging Personally Reviewed:  Electrocardiogram Personally Reviewed:    COORDINATION OF CARE:  Care Discussed with Consultants/Other Providers [Y/N]:  Prior or Outpatient Records Reviewed [Y/N]:   Dr. Yenny Dozier  Pager [phone]    PROGRESS NOTE:     Patient is a 68y old  Male who presents with a chief complaint of Pain of right hip.   Per 12/29/23 hospitalist attending chart review, Patient is a 68 year old male with pmh HTN, HLD, prev DVT, history of lumbar laminectomy in April 2023, bariatric surgery 15 years ago presenting after a fall at work, fell on his right side. Patient denied any dizziness prior to fall, denied any head trauma, no LOC, reports pain at R anterior thigh. Xray of RLE showed no fractures, CT R hip hematoma. VSS, labs were unremarkable. Admitted for management of thigh hematoma and fall.    (29 Dec 2023 16:33)      SUBJECTIVE / OVERNIGHT EVENTS: denies chest pain or sob  ADDITIONAL REVIEW OF SYSTEMS: c/o right medial ankle pain and still right thigh pain, unable to ambulate    MEDICATIONS  (STANDING):  acetaminophen     Tablet .. 650 milliGRAM(s) Oral every 6 hours  amLODIPine   Tablet 10 milliGRAM(s) Oral daily  atorvastatin 80 milliGRAM(s) Oral at bedtime  enoxaparin Injectable 40 milliGRAM(s) SubCutaneous every 24 hours  ketorolac   Injectable 15 milliGRAM(s) IV Push every 6 hours  lidocaine   4% Patch 1 Patch Transdermal daily  polyethylene glycol 3350 17 Gram(s) Oral daily  sodium chloride 0.9%. 1000 milliLiter(s) (100 mL/Hr) IV Continuous <Continuous>  tamsulosin 0.4 milliGRAM(s) Oral at bedtime    MEDICATIONS  (PRN):  oxyCODONE    IR 5 milliGRAM(s) Oral every 6 hours PRN Moderate Pain (4 - 6)  zaleplon 5 milliGRAM(s) Oral at bedtime PRN Insomnia      CAPILLARY BLOOD GLUCOSE        I&O's Summary    30 Dec 2023 07:01  -  31 Dec 2023 07:00  --------------------------------------------------------  IN: 0 mL / OUT: 850 mL / NET: -850 mL        PHYSICAL EXAM:  Vital Signs Last 24 Hrs  T(C): 36.6 (31 Dec 2023 10:03), Max: 36.6 (30 Dec 2023 17:36)  T(F): 97.8 (31 Dec 2023 10:03), Max: 97.9 (30 Dec 2023 17:36)  HR: 75 (31 Dec 2023 10:03) (72 - 81)  BP: 123/62 (31 Dec 2023 10:03) (118/60 - 138/70)  BP(mean): --  RR: 17 (31 Dec 2023 10:03) (16 - 18)  SpO2: 96% (31 Dec 2023 10:03) (95% - 97%)    Parameters below as of 31 Dec 2023 10:03  Patient On (Oxygen Delivery Method): room air      CONSTITUTIONAL: NAD, well-developed  RESPIRATORY: Normal respiratory effort; lungs are clear to auscultation bilaterally  CARDIOVASCULAR: Regular rate and rhythm, normal S1 and S2, no murmur/rub/gallop; No lower extremity edema; Peripheral pulses are 2+ bilaterally  ABDOMEN: Nontender to palpation, normoactive bowel sounds, no rebound/guarding; No hepatosplenomegaly  MUSCULOSKELETAL: no clubbing or cyanosis of digits; no joint swelling or tenderness to palpation  PSYCH: A+O to person, place, and time; affect appropriate    LABS:                      RADIOLOGY & ADDITIONAL TESTS:  Results Reviewed:   Imaging Personally Reviewed:  Electrocardiogram Personally Reviewed:    COORDINATION OF CARE:  Care Discussed with Consultants/Other Providers [Y/N]:  Prior or Outpatient Records Reviewed [Y/N]:

## 2024-01-01 ENCOUNTER — TRANSCRIPTION ENCOUNTER (OUTPATIENT)
Age: 69
End: 2024-01-01

## 2024-01-01 LAB
URATE SERPL-MCNC: 5.1 MG/DL — SIGNIFICANT CHANGE UP (ref 3.4–8.8)
URATE SERPL-MCNC: 5.1 MG/DL — SIGNIFICANT CHANGE UP (ref 3.4–8.8)

## 2024-01-01 PROCEDURE — 99232 SBSQ HOSP IP/OBS MODERATE 35: CPT

## 2024-01-01 RX ADMIN — Medication 15 MILLIGRAM(S): at 14:45

## 2024-01-01 RX ADMIN — Medication 15 MILLIGRAM(S): at 22:14

## 2024-01-01 RX ADMIN — ENOXAPARIN SODIUM 40 MILLIGRAM(S): 100 INJECTION SUBCUTANEOUS at 17:39

## 2024-01-01 RX ADMIN — POLYETHYLENE GLYCOL 3350 17 GRAM(S): 17 POWDER, FOR SOLUTION ORAL at 12:09

## 2024-01-01 RX ADMIN — Medication 15 MILLIGRAM(S): at 13:45

## 2024-01-01 RX ADMIN — Medication 15 MILLIGRAM(S): at 21:15

## 2024-01-01 NOTE — PROGRESS NOTE ADULT - SUBJECTIVE AND OBJECTIVE BOX
Dr. Yenny Dozier  Pager [phone]    PROGRESS NOTE:     Patient is a 68y old  Male who presents with a chief complaint of Fall (31 Dec 2023 15:01)      SUBJECTIVE / OVERNIGHT EVENTS: denies chest pain or sob   ADDITIONAL REVIEW OF SYSTEMS: afebrile , still c/o right thigh and right ankle pain    MEDICATIONS  (STANDING):  acetaminophen     Tablet .. 650 milliGRAM(s) Oral every 6 hours  amLODIPine   Tablet 10 milliGRAM(s) Oral daily  atorvastatin 80 milliGRAM(s) Oral at bedtime  enoxaparin Injectable 40 milliGRAM(s) SubCutaneous every 24 hours  ketorolac   Injectable 15 milliGRAM(s) IV Push every 6 hours  lidocaine   4% Patch 1 Patch Transdermal daily  polyethylene glycol 3350 17 Gram(s) Oral daily  sodium chloride 0.9%. 1000 milliLiter(s) (100 mL/Hr) IV Continuous <Continuous>  tamsulosin 0.4 milliGRAM(s) Oral at bedtime    MEDICATIONS  (PRN):  oxyCODONE    IR 5 milliGRAM(s) Oral every 6 hours PRN Moderate Pain (4 - 6)  zaleplon 5 milliGRAM(s) Oral at bedtime PRN Insomnia      CAPILLARY BLOOD GLUCOSE        I&O's Summary    31 Dec 2023 07:01  -  01 Jan 2024 07:00  --------------------------------------------------------  IN: 0 mL / OUT: 775 mL / NET: -775 mL    01 Jan 2024 07:01  -  01 Jan 2024 15:12  --------------------------------------------------------  IN: 0 mL / OUT: 300 mL / NET: -300 mL        PHYSICAL EXAM:  Vital Signs Last 24 Hrs  T(C): 36.8 (01 Jan 2024 13:33), Max: 37.2 (01 Jan 2024 09:15)  T(F): 98.2 (01 Jan 2024 13:33), Max: 98.9 (01 Jan 2024 09:15)  HR: 74 (01 Jan 2024 13:33) (68 - 84)  BP: 114/62 (01 Jan 2024 13:33) (114/62 - 122/74)  BP(mean): --  RR: 17 (01 Jan 2024 13:33) (16 - 17)  SpO2: 95% (01 Jan 2024 13:33) (95% - 97%)    Parameters below as of 01 Jan 2024 13:33  Patient On (Oxygen Delivery Method): room air      CONSTITUTIONAL: NAD, well-developed  RESPIRATORY: Normal respiratory effort; lungs are clear to auscultation bilaterally  CARDIOVASCULAR: Regular rate and rhythm, normal S1 and S2, no murmur/rub/gallop; No lower extremity edema; Peripheral pulses are 2+ bilaterally  ABDOMEN: Nontender to palpation, normoactive bowel sounds, no rebound/guarding; No hepatosplenomegaly  MUSCULOSKELETAL: no clubbing or cyanosis of digits; no joint swelling or tenderness to palpation  PSYCH: A+O to person, place, and time; affect appropriate      LABS:                      RADIOLOGY & ADDITIONAL TESTS:  Results Reviewed:   Imaging Personally Reviewed:  Electrocardiogram Personally Reviewed:    COORDINATION OF CARE:  Care Discussed with Consultants/Other Providers [Y/N]:  Prior or Outpatient Records Reviewed [Y/N]:   Dr. Yenny Dozier  Pager [phone]    PROGRESS NOTE:     Patient is a 68y old  Male who presents with a chief complaint of Fall (31 Dec 2023 15:01)      SUBJECTIVE / OVERNIGHT EVENTS: denies chest pain or sob   ADDITIONAL REVIEW OF SYSTEMS: afebrile , still c/o right thigh and right ankle pain    MEDICATIONS  (STANDING):  acetaminophen     Tablet .. 650 milliGRAM(s) Oral every 6 hours  amLODIPine   Tablet 10 milliGRAM(s) Oral daily  atorvastatin 80 milliGRAM(s) Oral at bedtime  enoxaparin Injectable 40 milliGRAM(s) SubCutaneous every 24 hours  ketorolac   Injectable 15 milliGRAM(s) IV Push every 6 hours  lidocaine   4% Patch 1 Patch Transdermal daily  polyethylene glycol 3350 17 Gram(s) Oral daily  sodium chloride 0.9%. 1000 milliLiter(s) (100 mL/Hr) IV Continuous <Continuous>  tamsulosin 0.4 milliGRAM(s) Oral at bedtime    MEDICATIONS  (PRN):  oxyCODONE    IR 5 milliGRAM(s) Oral every 6 hours PRN Moderate Pain (4 - 6)  zaleplon 5 milliGRAM(s) Oral at bedtime PRN Insomnia      CAPILLARY BLOOD GLUCOSE        I&O's Summary    31 Dec 2023 07:01  -  01 Jan 2024 07:00  --------------------------------------------------------  IN: 0 mL / OUT: 775 mL / NET: -775 mL    01 Jan 2024 07:01  -  01 Jan 2024 15:12  --------------------------------------------------------  IN: 0 mL / OUT: 300 mL / NET: -300 mL        PHYSICAL EXAM:  Vital Signs Last 24 Hrs  T(C): 36.8 (01 Jan 2024 13:33), Max: 37.2 (01 Jan 2024 09:15)  T(F): 98.2 (01 Jan 2024 13:33), Max: 98.9 (01 Jan 2024 09:15)  HR: 74 (01 Jan 2024 13:33) (68 - 84)  BP: 114/62 (01 Jan 2024 13:33) (114/62 - 122/74)  BP(mean): --  RR: 17 (01 Jan 2024 13:33) (16 - 17)  SpO2: 95% (01 Jan 2024 13:33) (95% - 97%)    Parameters below as of 01 Jan 2024 13:33  Patient On (Oxygen Delivery Method): room air      CONSTITUTIONAL: NAD, well-developed  RESPIRATORY: Normal respiratory effort; lungs are clear to auscultation bilaterally  CARDIOVASCULAR: Regular rate and rhythm, normal S1 and S2, no murmur/rub/gallop; No lower extremity edema; Peripheral pulses are 2+ bilaterally  ABDOMEN: Nontender to palpation, normoactive bowel sounds, no rebound/guarding; No hepatosplenomegaly  MUSCULOSKELETAL: no clubbing or cyanosis of digits; no joint swelling or tenderness to palpation  PSYCH: A+O to person, place, and time; affect appropriate      LABS:                      RADIOLOGY & ADDITIONAL TESTS:  Results Reviewed:   Imaging Personally Reviewed:  Electrocardiogram Personally Reviewed:    COORDINATION OF CARE:  Care Discussed with Consultants/Other Providers [Y/N]:  Prior or Outpatient Records Reviewed [Y/N]:

## 2024-01-01 NOTE — PROGRESS NOTE ADULT - PROBLEM SELECTOR PLAN 1
- RLE anterior thigh hematoma s/p fall   - CT R hip showing hematoma of RLE , Ill-defined intramuscular hematoma in the anterior/lateral thigh.  - surgery evaluated pt for compartment syndrome, no evidence at this time. pulses intact/sensations intact   - pain control, ACE wrapped from foot to right thigh  - vascular surgery following, no compartment syndrome, CTA RLE intramuscular hematoma in the right anterior and lateral thigh, but no active extravasation, no active bleed  - lidocaine patch, tylenol and oxyIR prn for pain control  - serial exams, no sign of compartment syndrome   - right ankle pain , iv toradol prn, doubt gout with uric acid level 5.1 wnl, f/u right ankle xray and Leg duplex to r/o DVT  - PT recs KWAN  - Dispo: medically cleared for DC to rehab, f/u SW

## 2024-01-01 NOTE — DISCHARGE NOTE PROVIDER - NSDCCPTREATMENT_GEN_ALL_CORE_FT
PRINCIPAL PROCEDURE  Procedure: CT scan  Findings and Treatment:   < end of copied text >  RIGHT LOWER EXTREMITY:  Normal caliber patent right common, external, and internal iliac   arteries. Patent right common femoral, superficial femoral, and deep   femoral arteries. Patent popliteal artery. Patent tibioperoneal trunk.   Patent three vessel runoff to the right foot. No active arterial   extravasation.  ADDITIONAL FINDINGS: Hyperdensity are again seen in the right anterior   and lateral thigh musculature, predominantly involving the right vastus   lateralis and intermedius muscles, consistent with intramuscular   hematoma. No active hemorrhage. Small suprapatellar effusion. No acute   fracture or dislocation.  Enlarged prostate gland.  IMPRESSION:  Intramuscular hematoma in the right anterior and lateral thigh   musculature as described without active hemorrhage. Patent right lower   extremity arterial vasculature.< from: CT Angio Lower Extremity w/ IV Cont, Right (12.28.23 @ 17:20) >        SECONDARY PROCEDURE  Procedure: CT scan  Findings and Treatment:   < end of copied text >  FINDINGS:  BONES AND DISCS:  Posterior instrumentation and fusion spanning L4-L5 with intervertebral   disc spacer. Right L4-L5 laminotomy/facetectomy. No evidence for acute   hardware complication.  No acute fracture is identified. 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  Limit evaluation of the spinal canal soft tissue contents by CT. MRI   should be consideredif clinical concern for spinal canal process   persists.  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  SOFT TISSUES:  Paraspinal soft tissues are unremarkable.  OTHER:  Included retroperitoneum and pelvis are unremarkable.  IMPRESSION:  Posterior instrumentation/fusion at L4-L5. No evidence for acute hardware   complication.  No acute fracture identified.  Please see separate CT hip report< from: CT Lumbar Spine No Cont (12.22.23 @ 14:39) >

## 2024-01-01 NOTE — DISCHARGE NOTE PROVIDER - NSDCCPCAREPLAN_GEN_ALL_CORE_FT
PRINCIPAL DISCHARGE DIAGNOSIS  Diagnosis: Hematoma of right thigh  Assessment and Plan of Treatment: you were admitted with  Right  anterior thigh hematoma status post  fall  CT Right hip showing hematoma of RLE , Ill-defined intramuscular hematoma in the anterior/lateral thigh.  you were evaluated by  surgery evaluated  for compartment syndrome, no evidence of compartment syndrome at this time. You were treated conservatively with pain management and supportive care , with improvement in pain . Continue Physical therapy as outpatient      SECONDARY DISCHARGE DIAGNOSES  Diagnosis: History of lumbar laminectomy  Assessment and Plan of Treatment: CT L spine and hip shows Posterior instrumentation/fusion at L4-L5. No evidence for acute hardware complication., no acute fracture . Continue pain medecine as needed and Outpatient Physical therapy    Diagnosis: HTN (hypertension)  Assessment and Plan of Treatment: Continue blood pressure medication regimen as directed. Monitor for any visual changes, headaches or dizziness.  Monitor blood pressure regularly.  Follow up with your PCP for further management for high blood pressure.      Diagnosis: HLD (hyperlipidemia)  Assessment and Plan of Treatment: Continue cholesterol control medications. Continue DASH diet. Follow up with your PCP within 1 week of discharge for further management and monitoring of lipid and cholesterol panels.

## 2024-01-01 NOTE — DISCHARGE NOTE PROVIDER - HOSPITAL COURSE
69yo male with pmh HTN, HLD, prev DVT, history of lumbar laminectomy in April 2023, bariatric surgery 15 years ago presenting after a fall at work, fell on his right side. Patient denied any dizziness prior to fall, denied any head trauma, no LOC, reports pain at R anterior thigh. Xray of RLE showed no fractures, CT R hip hematoma. VSS, labs were unremarkable. Admitted for management of thigh hematoma and fall.        Problem/Plan - 1:  ·  Problem: Hematoma of right thigh.   ·  Plan: - RLE anterior thigh hematoma s/p fall   - CT R hip showing hematoma of RLE , Ill-defined intramuscular hematoma in the anterior/lateral thigh.  - surgery evaluated pt for compartment syndrome, no evidence at this time. pulses intact/sensations intact   - pain control, ACE wrapped from foot to right thigh  - vascular surgery following, no compartment syndrome, CTA RLE intramuscular hematoma in the right anterior and lateral thigh, but no active extravasation, no active bleed  - lidocaine patch, tylenol and oxyIR prn for pain control  - serial exams, no sign of compartment syndrome   - right ankle pain , iv toradol prn, doubt gout with uric acid level 5.1 wnl, f/u right ankle xray and Leg duplex to r/o DVT  - PT recs [facility]  - Dispo: medically cleared for DC to rehab, f/u SW.     Problem/Plan - 2:  ·  Problem: HTN (hypertension).   ·  Plan: - history of htn   - on amlodipine 10 daily at home.     Problem/Plan - 3:  ·  Problem: HLD (hyperlipidemia).   ·  Plan: - history of HLD   - atorvastatin 80mg daily at home.     Problem/Plan - 4:  ·  Problem: History of lumbar laminectomy.   ·  Plan: - hx of lumbar laminectomy, L4/L5 fusion in April 2023   - CT L spine and hip: Posterior instrumentation/fusion at L4-L5. No evidence for acute hardware   complication., no acute fracture   - uses tramadol at home as needed  - pain control.     Problem/Plan - 5:  ·  Problem: Prophylactic measure.   ·  Plan: Diet: regular diet   DVT: lovenox 40mg qday   Dispo: [facility]. 67yo male with pmh HTN, HLD, prev DVT, history of lumbar laminectomy in April 2023, bariatric surgery 15 years ago presenting after a fall at work, fell on his right side. Patient denied any dizziness prior to fall, denied any head trauma, no LOC, reports pain at R anterior thigh. Xray of RLE showed no fractures, CT R hip hematoma. VSS, labs were unremarkable. Admitted for management of thigh hematoma and fall.        Problem/Plan - 1:  ·  Problem: Hematoma of right thigh.   ·  Plan: - RLE anterior thigh hematoma s/p fall   - CT R hip showing hematoma of RLE , Ill-defined intramuscular hematoma in the anterior/lateral thigh.  - surgery evaluated pt for compartment syndrome, no evidence at this time. pulses intact/sensations intact   - pain control, ACE wrapped from foot to right thigh  - vascular surgery following, no compartment syndrome, CTA RLE intramuscular hematoma in the right anterior and lateral thigh, but no active extravasation, no active bleed  - lidocaine patch, tylenol and oxyIR prn for pain control  - serial exams, no sign of compartment syndrome   - right ankle pain , iv toradol prn, doubt gout with uric acid level 5.1 wnl, f/u right ankle xray and Leg duplex to r/o DVT  - PT recs [facility]  - Dispo: medically cleared for DC to rehab, f/u SW.     Problem/Plan - 2:  ·  Problem: HTN (hypertension).   ·  Plan: - history of htn   - on amlodipine 10 daily at home.     Problem/Plan - 3:  ·  Problem: HLD (hyperlipidemia).   ·  Plan: - history of HLD   - atorvastatin 80mg daily at home.     Problem/Plan - 4:  ·  Problem: History of lumbar laminectomy.   ·  Plan: - hx of lumbar laminectomy, L4/L5 fusion in April 2023   - CT L spine and hip: Posterior instrumentation/fusion at L4-L5. No evidence for acute hardware   complication., no acute fracture   - uses tramadol at home as needed  - pain control.     Problem/Plan - 5:  ·  Problem: Prophylactic measure.   ·  Plan: Diet: regular diet   DVT: lovenox 40mg qday   Dispo: [facility]. 67yo male with pmh HTN, HLD, prev DVT, history of lumbar laminectomy in April 2023, bariatric surgery 15 years ago presenting after a fall at work, fell on his right side. Patient denied any dizziness prior to fall, denied any head trauma, no LOC, reports pain at R anterior thigh. Xray of RLE showed no fractures, CT R hip hematoma. VSS, labs were unremarkable. Admitted for management of thigh hematoma and fall.     Hematoma of right thigh.   ·  Plan: - RLE anterior thigh hematoma s/p fall   - CT R hip showing hematoma of RLE , Ill-defined intramuscular hematoma in the anterior/lateral thigh.  - surgery evaluated pt for compartment syndrome, no evidence at this time. pulses intact/sensations intact   - pain control, ACE wrapped from foot to right thigh  - vascular surgery following, no compartment syndrome, CTA RLE intramuscular hematoma in the right anterior and lateral thigh, but no active extravasation, no active bleed  - lidocaine patch, tylenol and oxyIR prn for pain control  - serial exams, no sign of compartment syndrome   - right ankle pain , iv toradol prn, doubt gout with uric acid level 5.1 wnl, f/u right ankle xray and Leg duplex to r/o DVT  - PT recs [facility]  - Dispo: medically cleared for DC to rehab, f/u SW.     HTN (hypertension).   · - history of htn   - on amlodipine 10 daily at home.     HLD (hyperlipidemia).   - history of HLD   - atorvastatin 80mg daily at home.    History of lumbar laminectomy.    - hx of lumbar laminectomy, L4/L5 fusion in April 2023   - CT L spine and hip: Posterior instrumentation/fusion at L4-L5. No evidence for acute hardware   complication., no acute fracture   - uses tramadol at home as needed  - pain control.    PT f/u noted, rec outpt PT now   Patient hemodynamically stable for discharge home 69yo male with pmh HTN, HLD, prev DVT, history of lumbar laminectomy in April 2023, bariatric surgery 15 years ago presenting after a fall at work, fell on his right side. Patient denied any dizziness prior to fall, denied any head trauma, no LOC, reports pain at R anterior thigh. Xray of RLE showed no fractures, CT R hip hematoma. VSS, labs were unremarkable. Admitted for management of thigh hematoma and fall.     Hematoma of right thigh.   ·  Plan: - RLE anterior thigh hematoma s/p fall   - CT R hip showing hematoma of RLE , Ill-defined intramuscular hematoma in the anterior/lateral thigh.  - surgery evaluated pt for compartment syndrome, no evidence at this time. pulses intact/sensations intact   - pain control, ACE wrapped from foot to right thigh  - vascular surgery following, no compartment syndrome, CTA RLE intramuscular hematoma in the right anterior and lateral thigh, but no active extravasation, no active bleed  - lidocaine patch, tylenol and oxyIR prn for pain control  - serial exams, no sign of compartment syndrome   - right ankle pain , iv toradol prn, doubt gout with uric acid level 5.1 wnl, f/u right ankle xray and Leg duplex to r/o DVT  - PT recs [facility]  - Dispo: medically cleared for DC to rehab, f/u SW.     HTN (hypertension).   · - history of htn   - on amlodipine 10 daily at home.     HLD (hyperlipidemia).   - history of HLD   - atorvastatin 80mg daily at home.    History of lumbar laminectomy.    - hx of lumbar laminectomy, L4/L5 fusion in April 2023   - CT L spine and hip: Posterior instrumentation/fusion at L4-L5. No evidence for acute hardware   complication., no acute fracture   - uses tramadol at home as needed  - pain control.    PT f/u noted, rec outpt PT now   Patient hemodynamically stable for discharge home

## 2024-01-01 NOTE — DISCHARGE NOTE PROVIDER - NSDCFUSCHEDAPPT_GEN_ALL_CORE_FT
[facility]  TE[facility] Pre Surg Testing [facility]  Scheduled Appointment: 01/10/2024    [facility]  TENA [facility]  Scheduled Appointment: 01/23/2024    [facility]  OTOLARYNG [facility]ARA [address] [facility]  Scheduled Appointment: 01/23/2024    [facility]  OTOLARY[facility] [address] [facility]  Scheduled Appointment: 02/08/2024     [facility]  [facility] Pre Surg Testing [facility]  Scheduled Appointment: 01/10/2024    [facility]  TENA [facility]  Scheduled Appointment: 01/23/2024    [facility]  OTOLARYNG JARA [address] [facility]  Scheduled Appointment: 01/23/2024    [facility]  OTOLARY[facility] [address] [facility]  Scheduled Appointment: 02/08/2024     Melissa James  [facility]  TENA Pre Surg Testing [facility]  Scheduled Appointment: 01/10/2024    Melissa James  [facility]  TENA IPARK [facility]  Scheduled Appointment: 01/23/2024    [facility]  OTOLARYNG [address] [place] [address]  Scheduled Appointment: 02/08/2024     Melissa James  [facility]  TENA Pre Surg Testing [facility]  Scheduled Appointment: 01/10/2024    Melissa James  [facility]  TENA IPARK [facility]  Scheduled Appointment: 01/23/2024    [facility]  OTOLARYNG [address] [place] [address]  Scheduled Appointment: 02/08/2024

## 2024-01-01 NOTE — DISCHARGE NOTE PROVIDER - NSDCMRMEDTOKEN_GEN_ALL_CORE_FT
amlodipine-atorvastatin 10 mg-80 mg oral tablet: 1 tab(s) orally once a day  silodosin 8 mg oral capsule: 1 orally once a day  traMADol 50 mg oral tablet: 1 tab(s) orally every 6 hours as needed for  severe pain MDD: 4  zolpidem 10 mg oral tablet: 1 orally once a day   amlodipine-atorvastatin 10 mg-80 mg oral tablet: 1 tab(s) orally once a day  Outpatient Physical Therapy: 2-3x/week as needed  silodosin 8 mg oral capsule: 1 orally once a day  traMADol 50 mg oral tablet: 1 tab(s) orally every 6 hours as needed for  severe pain MDD: 4  zolpidem 10 mg oral tablet: 1 orally once a day   amlodipine-atorvastatin 10 mg-80 mg oral tablet: 1 tab(s) orally once a day  atorvastatin 80 mg oral tablet: 1 tab(s) orally once a day (at bedtime)  fluticasone 50 mcg/inh nasal spray: 1 spray(s) nasal 2 times a day  silodosin 8 mg oral capsule: 1 orally once a day  traMADol 50 mg oral tablet: 1 tab(s) orally every 6 hours as needed for  severe pain MDD: 4  zolpidem 10 mg oral tablet: 1 orally once a day

## 2024-01-02 PROCEDURE — 99232 SBSQ HOSP IP/OBS MODERATE 35: CPT

## 2024-01-02 PROCEDURE — 93971 EXTREMITY STUDY: CPT | Mod: 26

## 2024-01-02 PROCEDURE — 73600 X-RAY EXAM OF ANKLE: CPT | Mod: 26,RT

## 2024-01-02 RX ADMIN — Medication 15 MILLIGRAM(S): at 06:08

## 2024-01-02 RX ADMIN — Medication 15 MILLIGRAM(S): at 13:33

## 2024-01-02 RX ADMIN — Medication 15 MILLIGRAM(S): at 12:14

## 2024-01-02 RX ADMIN — ENOXAPARIN SODIUM 40 MILLIGRAM(S): 100 INJECTION SUBCUTANEOUS at 22:30

## 2024-01-02 RX ADMIN — Medication 15 MILLIGRAM(S): at 05:27

## 2024-01-02 NOTE — PROGRESS NOTE ADULT - PROBLEM SELECTOR PLAN 5
Diet: regular diet   DVT: lovenox 40mg qday   Dispo: [facility] Diet: regular diet   DVT: lovenox 40mg qday   Dispo: [facility]  plan of care d/w pt and ACP

## 2024-01-02 NOTE — PROGRESS NOTE ADULT - SUBJECTIVE AND OBJECTIVE BOX
Patient is a 68y old  Male who presents with a chief complaint of Fall (01 Jan 2024 17:33)      SUBJECTIVE / OVERNIGHT EVENTS:    MEDICATIONS  (STANDING):  acetaminophen     Tablet .. 650 milliGRAM(s) Oral every 6 hours  amLODIPine   Tablet 10 milliGRAM(s) Oral daily  atorvastatin 80 milliGRAM(s) Oral at bedtime  enoxaparin Injectable 40 milliGRAM(s) SubCutaneous every 24 hours  ketorolac   Injectable 15 milliGRAM(s) IV Push every 6 hours  lidocaine   4% Patch 1 Patch Transdermal daily  polyethylene glycol 3350 17 Gram(s) Oral daily  sodium chloride 0.9%. 1000 milliLiter(s) (100 mL/Hr) IV Continuous <Continuous>  tamsulosin 0.4 milliGRAM(s) Oral at bedtime    MEDICATIONS  (PRN):  oxyCODONE    IR 5 milliGRAM(s) Oral every 6 hours PRN Moderate Pain (4 - 6)  zaleplon 5 milliGRAM(s) Oral at bedtime PRN Insomnia      Vital Signs Last 24 Hrs  T(C): 37.2 (02 Jan 2024 09:08), Max: 37.2 (02 Jan 2024 09:08)  T(F): 98.9 (02 Jan 2024 09:08), Max: 98.9 (02 Jan 2024 09:08)  HR: 68 (02 Jan 2024 09:08) (65 - 79)  BP: 117/65 (02 Jan 2024 09:08) (112/64 - 128/69)  BP(mean): --  RR: 17 (02 Jan 2024 09:08) (17 - 18)  SpO2: 97% (02 Jan 2024 09:08) (95% - 98%)    Parameters below as of 02 Jan 2024 09:08  Patient On (Oxygen Delivery Method): room air      CAPILLARY BLOOD GLUCOSE        I&O's Summary    01 Jan 2024 07:01  -  02 Jan 2024 07:00  --------------------------------------------------------  IN: 0 mL / OUT: 1200 mL / NET: -1200 mL    02 Jan 2024 07:01  -  02 Jan 2024 09:47  --------------------------------------------------------  IN: 0 mL / OUT: 400 mL / NET: -400 mL        PHYSICAL EXAM:  GENERAL: NAD, well-developed  HEAD:  Atraumatic, Normocephalic  EYES: EOMI, PERRLA, conjunctiva and sclera clear  NECK: Supple, No JVD  CHEST/LUNG: Clear to auscultation bilaterally; No wheeze  HEART: Regular rate and rhythm; No murmurs, rubs, or gallops  ABDOMEN: Soft, Nontender, Nondistended; Bowel sounds present  EXTREMITIES:  2+ Peripheral Pulses, No clubbing, cyanosis, or edema  PSYCH: AAOx3  NEUROLOGY: non-focal  SKIN: No rashes or lesions    LABS:                    RADIOLOGY & ADDITIONAL TESTS:    Imaging Personally Reviewed:    Consultant(s) Notes Reviewed:      Care Discussed with Consultants/Other Providers:   Patient is a 68y old  Male who presents with a chief complaint of Fall (01 Jan 2024 17:33)      SUBJECTIVE / OVERNIGHT EVENTS: patient seen and examined by bedside, pt alert and awake, c/o pain in RLE, denies headache, dizziness, SOB, CP, Palpitations , N/V/D, abdominal pain      MEDICATIONS  (STANDING):  acetaminophen     Tablet .. 650 milliGRAM(s) Oral every 6 hours  amLODIPine   Tablet 10 milliGRAM(s) Oral daily  atorvastatin 80 milliGRAM(s) Oral at bedtime  enoxaparin Injectable 40 milliGRAM(s) SubCutaneous every 24 hours  ketorolac   Injectable 15 milliGRAM(s) IV Push every 6 hours  lidocaine   4% Patch 1 Patch Transdermal daily  polyethylene glycol 3350 17 Gram(s) Oral daily  sodium chloride 0.9%. 1000 milliLiter(s) (100 mL/Hr) IV Continuous <Continuous>  tamsulosin 0.4 milliGRAM(s) Oral at bedtime    MEDICATIONS  (PRN):  oxyCODONE    IR 5 milliGRAM(s) Oral every 6 hours PRN Moderate Pain (4 - 6)  zaleplon 5 milliGRAM(s) Oral at bedtime PRN Insomnia      Vital Signs Last 24 Hrs  T(C): 37.2 (02 Jan 2024 09:08), Max: 37.2 (02 Jan 2024 09:08)  T(F): 98.9 (02 Jan 2024 09:08), Max: 98.9 (02 Jan 2024 09:08)  HR: 68 (02 Jan 2024 09:08) (65 - 79)  BP: 117/65 (02 Jan 2024 09:08) (112/64 - 128/69)  BP(mean): --  RR: 17 (02 Jan 2024 09:08) (17 - 18)  SpO2: 97% (02 Jan 2024 09:08) (95% - 98%)    Parameters below as of 02 Jan 2024 09:08  Patient On (Oxygen Delivery Method): room air      CAPILLARY BLOOD GLUCOSE        I&O's Summary    01 Jan 2024 07:01  -  02 Jan 2024 07:00  --------------------------------------------------------  IN: 0 mL / OUT: 1200 mL / NET: -1200 mL    02 Jan 2024 07:01  -  02 Jan 2024 09:47  --------------------------------------------------------  IN: 0 mL / OUT: 400 mL / NET: -400 mL        PHYSICAL EXAM:  CONSTITUTIONAL: NAD, well-developed  RESPIRATORY: Normal respiratory effort; lungs are clear to auscultation bilaterally  CARDIOVASCULAR: Regular rate and rhythm, normal S1 and S2, no murmur/rub/gallop; No lower extremity edema; Peripheral pulses are 2+ bilaterally  ABDOMEN: Nontender to palpation, normoactive bowel sounds, no rebound/guarding; No hepatosplenomegaly  MUSCULOSKELETAL: no clubbing or cyanosis of digits; no joint swelling or tenderness to palpation  PSYCH: A+O to person, place, and time; affect appropriate        LABS:        no new labs             RADIOLOGY & ADDITIONAL TESTS:    Imaging Personally Reviewed:    Consultant(s) Notes Reviewed:      Care Discussed with Consultants/Other Providers:

## 2024-01-03 PROCEDURE — 99232 SBSQ HOSP IP/OBS MODERATE 35: CPT

## 2024-01-03 RX ORDER — FLUTICASONE PROPIONATE 50 MCG
1 SPRAY, SUSPENSION NASAL
Refills: 0 | Status: DISCONTINUED | OUTPATIENT
Start: 2024-01-03 | End: 2024-01-05

## 2024-01-03 RX ADMIN — Medication 100 MILLIGRAM(S): at 16:19

## 2024-01-03 RX ADMIN — ENOXAPARIN SODIUM 40 MILLIGRAM(S): 100 INJECTION SUBCUTANEOUS at 18:45

## 2024-01-03 NOTE — PROGRESS NOTE ADULT - PROBLEM SELECTOR PLAN 5
Diet: regular diet   DVT: lovenox 40mg qday   Dispo: [facility]  plan of care d/w pt and ACP Diet: regular diet   pt c/o cough at night, will add flonase for post nasal drip, PRN Robitussin   DVT: lovenox 40mg qday   Dispo: [facility]  plan of care d/w pt and ACP

## 2024-01-03 NOTE — PROGRESS NOTE ADULT - PROBLEM SELECTOR PLAN 1
- RLE anterior thigh hematoma s/p fall   - CT R hip showing hematoma of RLE , Ill-defined intramuscular hematoma in the anterior/lateral thigh.  - surgery evaluated pt for compartment syndrome, no evidence at this time. pulses intact/sensations intact   - pain control, ACE wrapped from foot to right thigh  - vascular surgery following, no compartment syndrome, CTA RLE intramuscular hematoma in the right anterior and lateral thigh, but no active extravasation, no active bleed  - lidocaine patch, tylenol and oxyIR prn for pain control  - serial exams, no sign of compartment syndrome   - right ankle pain , iv toradol prn, doubt gout with uric acid level 5.1 wnl, f/u right ankle xray and Leg duplex to r/o DVT  - PT recs [facility]  - Dispo: medically cleared for DC to rehab, f/u SW - RLE anterior thigh hematoma s/p fall   - CT R hip showing hematoma of RLE , Ill-defined intramuscular hematoma in the anterior/lateral thigh.  - surgery evaluated pt for compartment syndrome, no evidence at this time. pulses intact/sensations intact   - pain control, ACE wrapped from foot to right thigh  - vascular surgery following, no compartment syndrome, CTA RLE intramuscular hematoma in the right anterior and lateral thigh, but no active extravasation, no active bleed  - lidocaine patch, tylenol and oxyIR prn for pain control  - serial exams, no sign of compartment syndrome   - right ankle pain , iv toradol prn, doubt gout with uric acid level 5.1 wnl, right ankle xray  -no fracture /dislocation   -Leg duplex - no DVT,   Superficial vein thrombosis noted in the right calf great saphenous vein.  - PT recs [facility]AN  - Dispo: medically cleared for DC to rehab, f/u SW

## 2024-01-03 NOTE — PROGRESS NOTE ADULT - SUBJECTIVE AND OBJECTIVE BOX
Patient is a 68y old  Male who presents with a chief complaint of Fall (02 Jan 2024 09:47)      SUBJECTIVE / OVERNIGHT EVENTS:    MEDICATIONS  (STANDING):  acetaminophen     Tablet .. 650 milliGRAM(s) Oral every 6 hours  amLODIPine   Tablet 10 milliGRAM(s) Oral daily  atorvastatin 80 milliGRAM(s) Oral at bedtime  enoxaparin Injectable 40 milliGRAM(s) SubCutaneous every 24 hours  lidocaine   4% Patch 1 Patch Transdermal daily  polyethylene glycol 3350 17 Gram(s) Oral daily  sodium chloride 0.9%. 1000 milliLiter(s) (100 mL/Hr) IV Continuous <Continuous>  tamsulosin 0.4 milliGRAM(s) Oral at bedtime    MEDICATIONS  (PRN):  oxyCODONE    IR 5 milliGRAM(s) Oral every 6 hours PRN Moderate Pain (4 - 6)  zaleplon 5 milliGRAM(s) Oral at bedtime PRN Insomnia      Vital Signs Last 24 Hrs  T(C): 36.5 (03 Jan 2024 06:35), Max: 37 (02 Jan 2024 21:30)  T(F): 97.7 (03 Jan 2024 06:35), Max: 98.6 (02 Jan 2024 21:30)  HR: 72 (03 Jan 2024 06:35) (68 - 72)  BP: 125/68 (03 Jan 2024 06:35) (124/67 - 131/69)  BP(mean): --  RR: 17 (03 Jan 2024 06:35) (17 - 17)  SpO2: 99% (03 Jan 2024 06:35) (97% - 99%)    Parameters below as of 03 Jan 2024 06:35  Patient On (Oxygen Delivery Method): room air      CAPILLARY BLOOD GLUCOSE        I&O's Summary    02 Jan 2024 07:01  -  03 Jan 2024 07:00  --------------------------------------------------------  IN: 0 mL / OUT: 1800 mL / NET: -1800 mL        PHYSICAL EXAM:  GENERAL: NAD, well-developed  HEAD:  Atraumatic, Normocephalic  EYES: EOMI, PERRLA, conjunctiva and sclera clear  NECK: Supple, No JVD  CHEST/LUNG: Clear to auscultation bilaterally; No wheeze  HEART: Regular rate and rhythm; No murmurs, rubs, or gallops  ABDOMEN: Soft, Nontender, Nondistended; Bowel sounds present  EXTREMITIES:  2+ Peripheral Pulses, No clubbing, cyanosis, or edema  PSYCH: AAOx3  NEUROLOGY: non-focal  SKIN: No rashes or lesions    LABS:                    RADIOLOGY & ADDITIONAL TESTS:    Imaging Personally Reviewed:    Consultant(s) Notes Reviewed:      Care Discussed with Consultants/Other Providers:   Patient is a 68y old  Male who presents with a chief complaint of Fall (02 Jan 2024 09:47)      SUBJECTIVE / OVERNIGHT EVENTS: patient seen and examined by bedside, pt feeling better , c/o cough with phlegm at night , denies headache, dizziness, SOB, CP, Palpitations , N/V/D, abdominal pain      MEDICATIONS  (STANDING):  acetaminophen     Tablet .. 650 milliGRAM(s) Oral every 6 hours  amLODIPine   Tablet 10 milliGRAM(s) Oral daily  atorvastatin 80 milliGRAM(s) Oral at bedtime  enoxaparin Injectable 40 milliGRAM(s) SubCutaneous every 24 hours  lidocaine   4% Patch 1 Patch Transdermal daily  polyethylene glycol 3350 17 Gram(s) Oral daily  sodium chloride 0.9%. 1000 milliLiter(s) (100 mL/Hr) IV Continuous <Continuous>  tamsulosin 0.4 milliGRAM(s) Oral at bedtime    MEDICATIONS  (PRN):  oxyCODONE    IR 5 milliGRAM(s) Oral every 6 hours PRN Moderate Pain (4 - 6)  zaleplon 5 milliGRAM(s) Oral at bedtime PRN Insomnia      Vital Signs Last 24 Hrs  T(C): 36.5 (03 Jan 2024 06:35), Max: 37 (02 Jan 2024 21:30)  T(F): 97.7 (03 Jan 2024 06:35), Max: 98.6 (02 Jan 2024 21:30)  HR: 72 (03 Jan 2024 06:35) (68 - 72)  BP: 125/68 (03 Jan 2024 06:35) (124/67 - 131/69)  BP(mean): --  RR: 17 (03 Jan 2024 06:35) (17 - 17)  SpO2: 99% (03 Jan 2024 06:35) (97% - 99%)    Parameters below as of 03 Jan 2024 06:35  Patient On (Oxygen Delivery Method): room air      CAPILLARY BLOOD GLUCOSE        I&O's Summary    02 Jan 2024 07:01  -  03 Jan 2024 07:00  --------------------------------------------------------  IN: 0 mL / OUT: 1800 mL / NET: -1800 mL    PHYSICAL EXAM:  CONSTITUTIONAL: NAD, well-developed  RESPIRATORY: Normal respiratory effort; lungs are clear to auscultation bilaterally  CARDIOVASCULAR: Regular rate and rhythm, normal S1 and S2, no murmur/rub/gallop; No lower extremity edema; Peripheral pulses are 2+ bilaterally  ABDOMEN: Nontender to palpation, normoactive bowel sounds, no rebound/guarding; No hepatosplenomegaly  MUSCULOSKELETAL: no clubbing or cyanosis of digits; no joint swelling or tenderness to palpation  PSYCH: A+O to person, place, and time; affect appropriate          LABS:                    RADIOLOGY & ADDITIONAL TESTS:  < from: VA Duplex Venous Lower Ext Ltd, Right (01.02.24 @ 14:51) >      1. No evidence of deep vein thrombosis noted in theright upper extremity.   2. Superficial vein thrombosis noted in the right calf great saphenous vein.    < end of copied text >    Imaging Personally Reviewed:    Consultant(s) Notes Reviewed:    < from: Xray Ankle 2 Views, Right (01.02.24 @ 11:36) >  EXAM:  Frontal oblique lateral right ankle from 1/2/2024 at 1136. No similar   prior studies available for comparison.    IMPRESSION:  No fractures or dislocations.    Congruent ankle mortise with smooth and intact talar dome.    Preserved remaining visualized joint spaces and no joint margin erosions.    Minimal plantar calcaneal enthesopathic change.    No lytic or blastic lesions.    < end of copied text >    Care Discussed with Consultants/Other Providers:

## 2024-01-04 PROCEDURE — 99232 SBSQ HOSP IP/OBS MODERATE 35: CPT

## 2024-01-04 RX ORDER — ZALEPLON 10 MG
5 CAPSULE ORAL AT BEDTIME
Refills: 0 | Status: DISCONTINUED | OUTPATIENT
Start: 2024-01-04 | End: 2024-01-05

## 2024-01-04 RX ORDER — OXYCODONE HYDROCHLORIDE 5 MG/1
5 TABLET ORAL EVERY 6 HOURS
Refills: 0 | Status: DISCONTINUED | OUTPATIENT
Start: 2024-01-04 | End: 2024-01-05

## 2024-01-04 RX ADMIN — Medication 100 MILLIGRAM(S): at 11:52

## 2024-01-04 RX ADMIN — Medication 1 SPRAY(S): at 05:04

## 2024-01-04 RX ADMIN — ENOXAPARIN SODIUM 40 MILLIGRAM(S): 100 INJECTION SUBCUTANEOUS at 19:51

## 2024-01-04 RX ADMIN — OXYCODONE HYDROCHLORIDE 5 MILLIGRAM(S): 5 TABLET ORAL at 14:04

## 2024-01-04 RX ADMIN — OXYCODONE HYDROCHLORIDE 5 MILLIGRAM(S): 5 TABLET ORAL at 13:34

## 2024-01-04 RX ADMIN — Medication 1 SPRAY(S): at 18:01

## 2024-01-04 RX ADMIN — Medication 100 MILLIGRAM(S): at 17:59

## 2024-01-04 RX ADMIN — Medication 100 MILLIGRAM(S): at 02:13

## 2024-01-04 NOTE — PROGRESS NOTE ADULT - SUBJECTIVE AND OBJECTIVE BOX
Patient is a 68y old  Male who presents with a chief complaint of Fall (03 Jan 2024 09:48)      SUBJECTIVE / OVERNIGHT EVENTS:    MEDICATIONS  (STANDING):  acetaminophen     Tablet .. 650 milliGRAM(s) Oral every 6 hours  amLODIPine   Tablet 10 milliGRAM(s) Oral daily  atorvastatin 80 milliGRAM(s) Oral at bedtime  enoxaparin Injectable 40 milliGRAM(s) SubCutaneous every 24 hours  fluticasone propionate 50 MICROgram(s)/spray Nasal Spray 1 Spray(s) Both Nostrils two times a day  lidocaine   4% Patch 1 Patch Transdermal daily  polyethylene glycol 3350 17 Gram(s) Oral daily  sodium chloride 0.9%. 1000 milliLiter(s) (100 mL/Hr) IV Continuous <Continuous>  tamsulosin 0.4 milliGRAM(s) Oral at bedtime    MEDICATIONS  (PRN):  guaiFENesin Oral Liquid (Sugar-Free) 100 milliGRAM(s) Oral every 6 hours PRN Cough  oxyCODONE    IR 5 milliGRAM(s) Oral every 6 hours PRN Moderate Pain (4 - 6)  zaleplon 5 milliGRAM(s) Oral at bedtime PRN Insomnia      Vital Signs Last 24 Hrs  T(C): 36.4 (04 Jan 2024 09:46), Max: 36.9 (03 Jan 2024 21:16)  T(F): 97.5 (04 Jan 2024 09:46), Max: 98.4 (03 Jan 2024 21:16)  HR: 74 (04 Jan 2024 09:46) (63 - 78)  BP: 115/58 (04 Jan 2024 09:46) (97/59 - 136/63)  BP(mean): --  RR: 17 (04 Jan 2024 09:46) (17 - 18)  SpO2: 95% (04 Jan 2024 09:46) (91% - 96%)    Parameters below as of 04 Jan 2024 09:46  Patient On (Oxygen Delivery Method): room air      CAPILLARY BLOOD GLUCOSE        I&O's Summary      PHYSICAL EXAM:  GENERAL: NAD, well-developed  HEAD:  Atraumatic, Normocephalic  EYES: EOMI, PERRLA, conjunctiva and sclera clear  NECK: Supple, No JVD  CHEST/LUNG: Clear to auscultation bilaterally; No wheeze  HEART: Regular rate and rhythm; No murmurs, rubs, or gallops  ABDOMEN: Soft, Nontender, Nondistended; Bowel sounds present  EXTREMITIES:  2+ Peripheral Pulses, No clubbing, cyanosis, or edema  PSYCH: AAOx3  NEUROLOGY: non-focal  SKIN: No rashes or lesions    LABS:                    RADIOLOGY & ADDITIONAL TESTS:    Imaging Personally Reviewed:    Consultant(s) Notes Reviewed:      Care Discussed with Consultants/Other Providers:   Patient is a 68y old  Male who presents with a chief complaint of Fall (03 Jan 2024 09:48)      SUBJECTIVE / OVERNIGHT EVENTS: patient seen and examined by bedside, pt alert and awake, denies headache, dizziness, SOB, CP, Palpitations , N/V/D, abdominal pain, cough improving     MEDICATIONS  (STANDING):  acetaminophen     Tablet .. 650 milliGRAM(s) Oral every 6 hours  amLODIPine   Tablet 10 milliGRAM(s) Oral daily  atorvastatin 80 milliGRAM(s) Oral at bedtime  enoxaparin Injectable 40 milliGRAM(s) SubCutaneous every 24 hours  fluticasone propionate 50 MICROgram(s)/spray Nasal Spray 1 Spray(s) Both Nostrils two times a day  lidocaine   4% Patch 1 Patch Transdermal daily  polyethylene glycol 3350 17 Gram(s) Oral daily  sodium chloride 0.9%. 1000 milliLiter(s) (100 mL/Hr) IV Continuous <Continuous>  tamsulosin 0.4 milliGRAM(s) Oral at bedtime    MEDICATIONS  (PRN):  guaiFENesin Oral Liquid (Sugar-Free) 100 milliGRAM(s) Oral every 6 hours PRN Cough  oxyCODONE    IR 5 milliGRAM(s) Oral every 6 hours PRN Moderate Pain (4 - 6)  zaleplon 5 milliGRAM(s) Oral at bedtime PRN Insomnia      Vital Signs Last 24 Hrs  T(C): 36.4 (04 Jan 2024 09:46), Max: 36.9 (03 Jan 2024 21:16)  T(F): 97.5 (04 Jan 2024 09:46), Max: 98.4 (03 Jan 2024 21:16)  HR: 74 (04 Jan 2024 09:46) (63 - 78)  BP: 115/58 (04 Jan 2024 09:46) (97/59 - 136/63)  BP(mean): --  RR: 17 (04 Jan 2024 09:46) (17 - 18)  SpO2: 95% (04 Jan 2024 09:46) (91% - 96%)    Parameters below as of 04 Jan 2024 09:46  Patient On (Oxygen Delivery Method): room air              PHYSICAL EXAM:  CONSTITUTIONAL: NAD, well-developed  RESPIRATORY: Normal respiratory effort; lungs are clear to auscultation bilaterally  CARDIOVASCULAR: Regular rate and rhythm, normal S1 and S2, no murmur/rub/gallop; No lower extremity edema; Peripheral pulses are 2+ bilaterally  ABDOMEN: Nontender to palpation, normoactive bowel sounds, no rebound/guarding; No hepatosplenomegaly  MUSCULOSKELETAL: no clubbing or cyanosis of digits; no joint swelling or tenderness to palpation  PSYCH: A+O to person, place, and time; affect appropriate      LABS:        no ne w labs             RADIOLOGY & ADDITIONAL TESTS:    Imaging Personally Reviewed:    Consultant(s) Notes Reviewed:      Care Discussed with Consultants/Other Providers:

## 2024-01-04 NOTE — PROGRESS NOTE ADULT - PROBLEM SELECTOR PLAN 1
- RLE anterior thigh hematoma s/p fall   - CT R hip showing hematoma of RLE , Ill-defined intramuscular hematoma in the anterior/lateral thigh.  - surgery evaluated pt for compartment syndrome, no evidence at this time. pulses intact/sensations intact   - pain control, ACE wrapped from foot to right thigh  - vascular surgery following, no compartment syndrome, CTA RLE intramuscular hematoma in the right anterior and lateral thigh, but no active extravasation, no active bleed  - lidocaine patch, tylenol and oxyIR prn for pain control  - serial exams, no sign of compartment syndrome   - right ankle pain , iv toradol prn, doubt gout with uric acid level 5.1 wnl, right ankle xray  -no fracture /dislocation   -Leg duplex - no DVT,   Superficial vein thrombosis noted in the right calf great saphenous vein.  - PT recs [facility]  - Dispo: medically cleared for DC to rehab, f/u SW

## 2024-01-04 NOTE — PROGRESS NOTE ADULT - PROBLEM SELECTOR PLAN 5
Diet: regular diet   pt c/o cough at night, will add flonase for post nasal drip, PRN Robitussin   DVT: lovenox 40mg qday   Dispo: [facility]  plan of care d/w pt and ACP

## 2024-01-04 NOTE — CHART NOTE - NSCHARTNOTEFT_GEN_A_CORE
Reason for Follow-Up Assessment: Follow-Up    1/4/24 - Hospitalist:  69yo male with pmh HTN, HLD, prev DVT, history of lumbar laminectomy in April 2023, bariatric surgery 15 years ago presenting after a fall at work, fell on his right side. Patient denied any dizziness prior to fall, denied any head trauma, no LOC, reports pain at R anterior thigh. Xray of RLE showed no fractures, CT R hip hematoma. VSS, labs were unremarkable. Admitted for management of thigh hematoma and fall.     PO intake noted and reported to be good.  No GI distress reported i.e. nausea, vomiting, diarrhea. No chewing or swallowing difficulties reported. No food allergies noted or reported. Weight recorded 1/4/23 not consistent with previous recorded weights in HIE section, based on most current weight, reflective of weight gain trend from ~98kgs since June 2023.    Source: [ ] Patient [ ] Family [ ] RN [X] Chart     Diet, DASH/TLC:   Sodium & Cholesterol Restricted (12-23-23 @ 00:23)    GI: WDL. Last BM noted on [1/3/24 ]    PO intake:  [ ] Poor < 50%  [ ] Fair 50-75% [ X ] Good  % [ ] Inconsistent PO intake    Enteral /Parenteral Nutrition:       [ X ] n/a    Inpatient Weight Hx:  1/4/23 - 103.9kg          Edema: 2+ edema to rt leg per RN flowsheets    Pressure Injuries: No pressure injuries noted per RN flowsheets    _______________ Pertinent Medications_______________  MEDICATIONS  (STANDING):  acetaminophen     Tablet .. 650 milliGRAM(s) Oral every 6 hours  amLODIPine   Tablet 10 milliGRAM(s) Oral daily  atorvastatin 80 milliGRAM(s) Oral at bedtime  enoxaparin Injectable 40 milliGRAM(s) SubCutaneous every 24 hours  fluticasone propionate 50 MICROgram(s)/spray Nasal Spray 1 Spray(s) Both Nostrils two times a day  lidocaine   4% Patch 1 Patch Transdermal daily  polyethylene glycol 3350 17 Gram(s) Oral daily  sodium chloride 0.9%. 1000 milliLiter(s) (100 mL/Hr) IV Continuous <Continuous>  tamsulosin 0.4 milliGRAM(s) Oral at bedtime    MEDICATIONS  (PRN):  guaiFENesin Oral Liquid (Sugar-Free) 100 milliGRAM(s) Oral every 6 hours PRN Cough  oxyCODONE    IR 5 milliGRAM(s) Oral every 6 hours PRN Moderate Pain (4 - 6)  zaleplon 5 milliGRAM(s) Oral at bedtime PRN Insomnia    __________________ Pertinent Labs__________________   12-29 Na 141 mmol/L Glu 114 mg/dL<H> K+ 4.3 mmol/L Cr 0.81 mg/dL BUN 16 mg/dL Phos n/a         Estimated Needs:   [X ] no change since previous assessment  [ ] recalculated:     Previous Nutrition Diagnosis: Overweight / Obesity     Nutrition Diagnosis is [X] ongoing  [ ] resolved [ ] not applicable     New Nutrition Diagnosis: n/a     Diet Education:  [  ] Given (date / education provided)  [ X ] Given on previous assessment by RD  [  ] Not applicable due to mental status / cognitive deficits  [  ] Patient deferred / refused   [  ] Not applicable due to current medical status / prognosis     Monitoring and Evaluation:     [ x ] Tolerance to diet prescription / adequacy of meal intake  [ x ] Weight trends   [ x ] Pertinent labs    Recommendations:  1) Diet / Supplement Changes: Continue diet as ordered  2) Obtain and record current weights to best monitor for acute changes in nutritional status.  3) Please consistently document % meal intake in nursing flowsheets.  4) Obtain new a1c - last A1c 6% April 2023.  5) Consider provision of micronutrient supplementation i.e. multivitamin (liquid), B-12, Iron in setting of h/o bariatric surgery.    Radha Giles, MS, RDN, CDN  Pager [phone]  Also available on MS Teams Reason for Follow-Up Assessment: Follow-Up    1/4/24 - Hospitalist:  69yo male with pmh HTN, HLD, prev DVT, history of lumbar laminectomy in April 2023, bariatric surgery 15 years ago presenting after a fall at work, fell on his right side. Patient denied any dizziness prior to fall, denied any head trauma, no LOC, reports pain at R anterior thigh. Xray of RLE showed no fractures, CT R hip hematoma. VSS, labs were unremarkable. Admitted for management of thigh hematoma and fall.     PO intake noted and reported to be good.  No GI distress reported i.e. nausea, vomiting, diarrhea. No chewing or swallowing difficulties reported. No food allergies noted or reported. Weight recorded 1/4/23 not consistent with previous recorded weights in HIE section, based on most current weight, reflective of weight gain trend from ~98kgs since June 2023.    Source: [ ] Patient [ ] Family [ ] RN [X] Chart     Diet, DASH/TLC:   Sodium & Cholesterol Restricted (12-23-23 @ 00:23)    GI: WDL. Last BM noted on [1/3/24 ]    PO intake:  [ ] Poor < 50%  [ ] Fair 50-75% [ X ] Good  % [ ] Inconsistent PO intake    Enteral /Parenteral Nutrition:       [ X ] n/a    Inpatient Weight Hx:  1/4/23 - 103.9kg          Edema: 2+ edema to rt leg per RN flowsheets    Pressure Injuries: No pressure injuries noted per RN flowsheets    _______________ Pertinent Medications_______________  MEDICATIONS  (STANDING):  acetaminophen     Tablet .. 650 milliGRAM(s) Oral every 6 hours  amLODIPine   Tablet 10 milliGRAM(s) Oral daily  atorvastatin 80 milliGRAM(s) Oral at bedtime  enoxaparin Injectable 40 milliGRAM(s) SubCutaneous every 24 hours  fluticasone propionate 50 MICROgram(s)/spray Nasal Spray 1 Spray(s) Both Nostrils two times a day  lidocaine   4% Patch 1 Patch Transdermal daily  polyethylene glycol 3350 17 Gram(s) Oral daily  sodium chloride 0.9%. 1000 milliLiter(s) (100 mL/Hr) IV Continuous <Continuous>  tamsulosin 0.4 milliGRAM(s) Oral at bedtime    MEDICATIONS  (PRN):  guaiFENesin Oral Liquid (Sugar-Free) 100 milliGRAM(s) Oral every 6 hours PRN Cough  oxyCODONE    IR 5 milliGRAM(s) Oral every 6 hours PRN Moderate Pain (4 - 6)  zaleplon 5 milliGRAM(s) Oral at bedtime PRN Insomnia    __________________ Pertinent Labs__________________   12-29 Na 141 mmol/L Glu 114 mg/dL<H> K+ 4.3 mmol/L Cr 0.81 mg/dL BUN 16 mg/dL Phos n/a         Estimated Needs:   [X ] no change since previous assessment  [ ] recalculated:     Previous Nutrition Diagnosis: Overweight / Obesity     Nutrition Diagnosis is [X] ongoing  [ ] resolved [ ] not applicable     New Nutrition Diagnosis: n/a     Diet Education:  [  ] Given (date / education provided)  [ X ] Given on previous assessment by RD  [  ] Not applicable due to mental status / cognitive deficits  [  ] Patient deferred / refused   [  ] Not applicable due to current medical status / prognosis     Monitoring and Evaluation:     [ x ] Tolerance to diet prescription / adequacy of meal intake  [ x ] Weight trends   [ x ] Pertinent labs    Recommendations:  1) Diet / Supplement Changes: Continue diet as ordered  2) Obtain and record current weights to best monitor for acute changes in nutritional status.  3) Please consistently document % meal intake in nursing flowsheets.  4) Obtain new a1c - last A1c 6% April 2023.  5) Consider provision of micronutrient supplementation i.e. multivitamin (liquid), B-12, Iron in setting of h/o bariatric surgery.    Radha Giles, MS, RDN, CDN  Pager [phone]  Also available on MS Teams Reason for Follow-Up Assessment: Follow-Up    1/4/24 - Hospitalist:  69yo male with pmh HTN, HLD, prev DVT, history of lumbar laminectomy in April 2023, bariatric surgery 15 years ago presenting after a fall at work, fell on his right side. Patient denied any dizziness prior to fall, denied any head trauma, no LOC, reports pain at R anterior thigh. Xray of RLE showed no fractures, CT R hip hematoma. VSS, labs were unremarkable. Admitted for management of thigh hematoma and fall.     PO intake noted and reported to be good.  No GI distress reported i.e. nausea, vomiting, diarrhea. No chewing or swallowing difficulties reported. No food allergies noted or reported. Weight recorded 1/4/23 not consistent with previous recorded weights in HIE section, based on most current weight, reflective of weight gain trend from ~98kgs since June 2023. Patient without nutritional concerns at this time.    Source: [X] Patient [ ] Family [ ] RN [X] Chart     Diet, DASH/TLC:   Sodium & Cholesterol Restricted (12-23-23 @ 00:23)    GI: WDL. Last BM noted on [1/3/24 ]    PO intake:  [ ] Poor < 50%  [ ] Fair 50-75% [ X ] Good  % [ ] Inconsistent PO intake    Enteral /Parenteral Nutrition:       [ X ] n/a    Inpatient Weight Hx:  1/4/23 - 103.9kg          Edema: 2+ edema to rt leg per RN flowsheets    Pressure Injuries: No pressure injuries noted per RN flowsheets    _______________ Pertinent Medications_______________  MEDICATIONS  (STANDING):  acetaminophen     Tablet .. 650 milliGRAM(s) Oral every 6 hours  amLODIPine   Tablet 10 milliGRAM(s) Oral daily  atorvastatin 80 milliGRAM(s) Oral at bedtime  enoxaparin Injectable 40 milliGRAM(s) SubCutaneous every 24 hours  fluticasone propionate 50 MICROgram(s)/spray Nasal Spray 1 Spray(s) Both Nostrils two times a day  lidocaine   4% Patch 1 Patch Transdermal daily  polyethylene glycol 3350 17 Gram(s) Oral daily  sodium chloride 0.9%. 1000 milliLiter(s) (100 mL/Hr) IV Continuous <Continuous>  tamsulosin 0.4 milliGRAM(s) Oral at bedtime    MEDICATIONS  (PRN):  guaiFENesin Oral Liquid (Sugar-Free) 100 milliGRAM(s) Oral every 6 hours PRN Cough  oxyCODONE    IR 5 milliGRAM(s) Oral every 6 hours PRN Moderate Pain (4 - 6)  zaleplon 5 milliGRAM(s) Oral at bedtime PRN Insomnia    __________________ Pertinent Labs__________________   12-29 Na 141 mmol/L Glu 114 mg/dL<H> K+ 4.3 mmol/L Cr 0.81 mg/dL BUN 16 mg/dL Phos n/a         Estimated Needs:   [X ] no change since previous assessment  [ ] recalculated:     Previous Nutrition Diagnosis: Overweight / Obesity     Nutrition Diagnosis is [X] ongoing  [ ] resolved [ ] not applicable     New Nutrition Diagnosis: n/a     Diet Education:  [  ] Given (date / education provided)  [ X ] Given on previous assessment by RD  [  ] Not applicable due to mental status / cognitive deficits  [  ] Patient deferred / refused   [  ] Not applicable due to current medical status / prognosis     Monitoring and Evaluation:     [ x ] Tolerance to diet prescription / adequacy of meal intake  [ x ] Weight trends   [ x ] Pertinent labs    Recommendations:  1) Diet / Supplement Changes: Continue diet as ordered  2) Obtain and record current weights to best monitor for acute changes in nutritional status.  3) Please consistently document % meal intake in nursing flowsheets.  4) Suggest obtaining new a1c - last A1c 6% April 2023.  5) Consider provision of micronutrient supplementation i.e. multivitamin (liquid), B-12, Iron in setting of h/o bariatric surgery.    Radha Giles, MS, RDN, CDN  Pager [phone]  Also available on MS Teams Reason for Follow-Up Assessment: Follow-Up    1/4/24 - Hospitalist:  67yo male with pmh HTN, HLD, prev DVT, history of lumbar laminectomy in April 2023, bariatric surgery 15 years ago presenting after a fall at work, fell on his right side. Patient denied any dizziness prior to fall, denied any head trauma, no LOC, reports pain at R anterior thigh. Xray of RLE showed no fractures, CT R hip hematoma. VSS, labs were unremarkable. Admitted for management of thigh hematoma and fall.     PO intake noted and reported to be good.  No GI distress reported i.e. nausea, vomiting, diarrhea. No chewing or swallowing difficulties reported. No food allergies noted or reported. Weight recorded 1/4/23 not consistent with previous recorded weights in HIE section, based on most current weight, reflective of weight gain trend from ~98kgs since June 2023. Patient without nutritional concerns at this time.    Source: [X] Patient [ ] Family [ ] RN [X] Chart     Diet, DASH/TLC:   Sodium & Cholesterol Restricted (12-23-23 @ 00:23)    GI: WDL. Last BM noted on [1/3/24 ]    PO intake:  [ ] Poor < 50%  [ ] Fair 50-75% [ X ] Good  % [ ] Inconsistent PO intake    Enteral /Parenteral Nutrition:       [ X ] n/a    Inpatient Weight Hx:  1/4/23 - 103.9kg          Edema: 2+ edema to rt leg per RN flowsheets    Pressure Injuries: No pressure injuries noted per RN flowsheets    _______________ Pertinent Medications_______________  MEDICATIONS  (STANDING):  acetaminophen     Tablet .. 650 milliGRAM(s) Oral every 6 hours  amLODIPine   Tablet 10 milliGRAM(s) Oral daily  atorvastatin 80 milliGRAM(s) Oral at bedtime  enoxaparin Injectable 40 milliGRAM(s) SubCutaneous every 24 hours  fluticasone propionate 50 MICROgram(s)/spray Nasal Spray 1 Spray(s) Both Nostrils two times a day  lidocaine   4% Patch 1 Patch Transdermal daily  polyethylene glycol 3350 17 Gram(s) Oral daily  sodium chloride 0.9%. 1000 milliLiter(s) (100 mL/Hr) IV Continuous <Continuous>  tamsulosin 0.4 milliGRAM(s) Oral at bedtime    MEDICATIONS  (PRN):  guaiFENesin Oral Liquid (Sugar-Free) 100 milliGRAM(s) Oral every 6 hours PRN Cough  oxyCODONE    IR 5 milliGRAM(s) Oral every 6 hours PRN Moderate Pain (4 - 6)  zaleplon 5 milliGRAM(s) Oral at bedtime PRN Insomnia    __________________ Pertinent Labs__________________   12-29 Na 141 mmol/L Glu 114 mg/dL<H> K+ 4.3 mmol/L Cr 0.81 mg/dL BUN 16 mg/dL Phos n/a         Estimated Needs:   [X ] no change since previous assessment  [ ] recalculated:     Previous Nutrition Diagnosis: Overweight / Obesity     Nutrition Diagnosis is [X] ongoing  [ ] resolved [ ] not applicable     New Nutrition Diagnosis: n/a     Diet Education:  [  ] Given (date / education provided)  [ X ] Given on previous assessment by RD  [  ] Not applicable due to mental status / cognitive deficits  [  ] Patient deferred / refused   [  ] Not applicable due to current medical status / prognosis     Monitoring and Evaluation:     [ x ] Tolerance to diet prescription / adequacy of meal intake  [ x ] Weight trends   [ x ] Pertinent labs    Recommendations:  1) Diet / Supplement Changes: Continue diet as ordered  2) Obtain and record current weights to best monitor for acute changes in nutritional status.  3) Please consistently document % meal intake in nursing flowsheets.  4) Suggest obtaining new a1c - last A1c 6% April 2023.  5) Consider provision of micronutrient supplementation i.e. multivitamin (liquid), B-12, Iron in setting of h/o bariatric surgery.    Radha Giles, MS, RDN, CDN  Pager [phone]  Also available on MS Teams

## 2024-01-05 VITALS
OXYGEN SATURATION: 99 % | SYSTOLIC BLOOD PRESSURE: 108 MMHG | DIASTOLIC BLOOD PRESSURE: 51 MMHG | RESPIRATION RATE: 17 BRPM | HEART RATE: 71 BPM | TEMPERATURE: 98 F

## 2024-01-05 PROCEDURE — 99239 HOSP IP/OBS DSCHRG MGMT >30: CPT

## 2024-01-05 RX ORDER — ATORVASTATIN CALCIUM 80 MG/1
1 TABLET, FILM COATED ORAL
Qty: 0 | Refills: 0 | DISCHARGE
Start: 2024-01-05

## 2024-01-05 RX ORDER — FLUTICASONE PROPIONATE 50 MCG
1 SPRAY, SUSPENSION NASAL
Qty: 1 | Refills: 0
Start: 2024-01-05 | End: 2024-01-14

## 2024-01-05 RX ADMIN — OXYCODONE HYDROCHLORIDE 5 MILLIGRAM(S): 5 TABLET ORAL at 12:37

## 2024-01-05 RX ADMIN — POLYETHYLENE GLYCOL 3350 17 GRAM(S): 17 POWDER, FOR SOLUTION ORAL at 12:35

## 2024-01-05 RX ADMIN — Medication 100 MILLIGRAM(S): at 12:35

## 2024-01-05 RX ADMIN — OXYCODONE HYDROCHLORIDE 5 MILLIGRAM(S): 5 TABLET ORAL at 06:15

## 2024-01-05 RX ADMIN — Medication 100 MILLIGRAM(S): at 05:32

## 2024-01-05 RX ADMIN — Medication 1 SPRAY(S): at 05:36

## 2024-01-05 RX ADMIN — OXYCODONE HYDROCHLORIDE 5 MILLIGRAM(S): 5 TABLET ORAL at 05:32

## 2024-01-05 NOTE — PROGRESS NOTE ADULT - PROBLEM SELECTOR PLAN 4
- hx of lumbar laminectomy, L4/L5 fusion in April 2023   - CT L spine and hip: Posterior instrumentation/fusion at L4-L5. No evidence for acute hardware   complication., no acute fracture   - uses tramadol at home as needed  - pain control
- hx of lumbar laminectomy, L4/L5 fusion in April 2023   - uses tramadol at home as needed  - pain control
- hx of lumbar laminectomy, L4/L5 fusion in April 2023   - CT L spine and hip: Posterior instrumentation/fusion at L4-L5. No evidence for acute hardware   complication., no acute fracture   - uses tramadol at home as needed  - pain control
- hx of lumbar laminectomy, L4/L5 fusion in April 2023   - uses tramadol at home as needed  - pain control
- hx of lumbar laminectomy, L4/L5 fusion in April 2023   - CT L spine and hip: Posterior instrumentation/fusion at L4-L5. No evidence for acute hardware   complication., no acute fracture   - uses tramadol at home as needed  - pain control
- hx of lumbar laminectomy, L4/L5 fusion in April 2023   - uses tramadol at home as needed  - pain control
- hx of lumbar laminectomy, L4/L5 fusion in April 2023   - CT L spine and hip: Posterior instrumentation/fusion at L4-L5. No evidence for acute hardware   complication., no acute fracture   - uses tramadol at home as needed  - pain control

## 2024-01-05 NOTE — PROGRESS NOTE ADULT - PROBLEM SELECTOR PROBLEM 4
History of lumbar laminectomy

## 2024-01-05 NOTE — PROGRESS NOTE ADULT - PROBLEM SELECTOR PLAN 3
- history of HLD   - atorvastatin 80mg daily at home

## 2024-01-05 NOTE — PROGRESS NOTE ADULT - PROBLEM SELECTOR PLAN 2
- history of htn   - on amlodipine 10 daily at home

## 2024-01-05 NOTE — PROGRESS NOTE ADULT - PROBLEM SELECTOR PLAN 5
Diet: regular diet   pt c/o cough at night, will add flonase for post nasal drip, PRN Robitussin   DVT: lovenox 40mg qday   Dispo: [facility]  plan of care d/w pt and ACP Diet: regular diet   pt c/o cough at night, will add flonase for post nasal drip, PRN Robitussin   DVT: lovenox 40mg qday   Dispo: [facility] was initially recommended, PT f/u noted, rec outpt PT   Patient hemodynamically stable for discharge home  Time spent in discharge process is 33 min    plan of care d/w pt and ACP

## 2024-01-05 NOTE — PROGRESS NOTE ADULT - PROVIDER SPECIALTY LIST ADULT
Hospitalist
Surgery
Hospitalist
Hospitalist
Surgery
Hospitalist

## 2024-01-05 NOTE — PROGRESS NOTE ADULT - PROBLEM SELECTOR PROBLEM 1
Hematoma of right thigh

## 2024-01-05 NOTE — PROGRESS NOTE ADULT - SUBJECTIVE AND OBJECTIVE BOX
Patient is a 68y old  Male who presents with a chief complaint of Fall (04 Jan 2024 10:25)      SUBJECTIVE / OVERNIGHT EVENTS:    MEDICATIONS  (STANDING):  acetaminophen     Tablet .. 650 milliGRAM(s) Oral every 6 hours  amLODIPine   Tablet 10 milliGRAM(s) Oral daily  atorvastatin 80 milliGRAM(s) Oral at bedtime  enoxaparin Injectable 40 milliGRAM(s) SubCutaneous every 24 hours  fluticasone propionate 50 MICROgram(s)/spray Nasal Spray 1 Spray(s) Both Nostrils two times a day  lidocaine   4% Patch 1 Patch Transdermal daily  polyethylene glycol 3350 17 Gram(s) Oral daily  sodium chloride 0.9%. 1000 milliLiter(s) (100 mL/Hr) IV Continuous <Continuous>  tamsulosin 0.4 milliGRAM(s) Oral at bedtime    MEDICATIONS  (PRN):  guaiFENesin Oral Liquid (Sugar-Free) 100 milliGRAM(s) Oral every 6 hours PRN Cough  oxyCODONE    IR 5 milliGRAM(s) Oral every 6 hours PRN Moderate Pain (4 - 6)  zaleplon 5 milliGRAM(s) Oral at bedtime PRN Insomnia      Vital Signs Last 24 Hrs  T(C): 36.5 (05 Jan 2024 09:25), Max: 36.6 (04 Jan 2024 22:30)  T(F): 97.7 (05 Jan 2024 09:25), Max: 97.8 (04 Jan 2024 22:30)  HR: 71 (05 Jan 2024 09:25) (61 - 73)  BP: 108/51 (05 Jan 2024 09:25) (108/51 - 121/64)  BP(mean): --  RR: 17 (05 Jan 2024 09:25) (17 - 18)  SpO2: 99% (05 Jan 2024 09:25) (95% - 99%)    Parameters below as of 05 Jan 2024 09:25  Patient On (Oxygen Delivery Method): room air      CAPILLARY BLOOD GLUCOSE        I&O's Summary    04 Jan 2024 07:01  -  05 Jan 2024 07:00  --------------------------------------------------------  IN: 0 mL / OUT: 250 mL / NET: -250 mL    05 Jan 2024 07:01  -  05 Jan 2024 10:01  --------------------------------------------------------  IN: 0 mL / OUT: 250 mL / NET: -250 mL        PHYSICAL EXAM:  GENERAL: NAD, well-developed  HEAD:  Atraumatic, Normocephalic  EYES: EOMI, PERRLA, conjunctiva and sclera clear  NECK: Supple, No JVD  CHEST/LUNG: Clear to auscultation bilaterally; No wheeze  HEART: Regular rate and rhythm; No murmurs, rubs, or gallops  ABDOMEN: Soft, Nontender, Nondistended; Bowel sounds present  EXTREMITIES:  2+ Peripheral Pulses, No clubbing, cyanosis, or edema  PSYCH: AAOx3  NEUROLOGY: non-focal  SKIN: No rashes or lesions    LABS:                    RADIOLOGY & ADDITIONAL TESTS:    Imaging Personally Reviewed:    Consultant(s) Notes Reviewed:      Care Discussed with Consultants/Other Providers:   Patient is a 68y old  Male who presents with a chief complaint of Fall (04 Jan 2024 10:25)      SUBJECTIVE / OVERNIGHT EVENTS: patient seen and examined by bedside, pt feeling better, cough improving, pain controlled, pt agreeable to go home today     MEDICATIONS  (STANDING):  acetaminophen     Tablet .. 650 milliGRAM(s) Oral every 6 hours  amLODIPine   Tablet 10 milliGRAM(s) Oral daily  atorvastatin 80 milliGRAM(s) Oral at bedtime  enoxaparin Injectable 40 milliGRAM(s) SubCutaneous every 24 hours  fluticasone propionate 50 MICROgram(s)/spray Nasal Spray 1 Spray(s) Both Nostrils two times a day  lidocaine   4% Patch 1 Patch Transdermal daily  polyethylene glycol 3350 17 Gram(s) Oral daily  sodium chloride 0.9%. 1000 milliLiter(s) (100 mL/Hr) IV Continuous <Continuous>  tamsulosin 0.4 milliGRAM(s) Oral at bedtime    MEDICATIONS  (PRN):  guaiFENesin Oral Liquid (Sugar-Free) 100 milliGRAM(s) Oral every 6 hours PRN Cough  oxyCODONE    IR 5 milliGRAM(s) Oral every 6 hours PRN Moderate Pain (4 - 6)  zaleplon 5 milliGRAM(s) Oral at bedtime PRN Insomnia      Vital Signs Last 24 Hrs  T(C): 36.5 (05 Jan 2024 09:25), Max: 36.6 (04 Jan 2024 22:30)  T(F): 97.7 (05 Jan 2024 09:25), Max: 97.8 (04 Jan 2024 22:30)  HR: 71 (05 Jan 2024 09:25) (61 - 73)  BP: 108/51 (05 Jan 2024 09:25) (108/51 - 121/64)  BP(mean): --  RR: 17 (05 Jan 2024 09:25) (17 - 18)  SpO2: 99% (05 Jan 2024 09:25) (95% - 99%)    Parameters below as of 05 Jan 2024 09:25  Patient On (Oxygen Delivery Method): room air      CAPILLARY BLOOD GLUCOSE        I&O's Summary    04 Jan 2024 07:01  -  05 Jan 2024 07:00  --------------------------------------------------------  IN: 0 mL / OUT: 250 mL / NET: -250 mL    05 Jan 2024 07:01  -  05 Jan 2024 10:01  --------------------------------------------------------  IN: 0 mL / OUT: 250 mL / NET: -250 mL      PHYSICAL EXAM:  CONSTITUTIONAL: NAD, well-developed  RESPIRATORY: Normal respiratory effort; lungs are clear to auscultation bilaterally  CARDIOVASCULAR: Regular rate and rhythm, normal S1 and S2, no murmur/rub/gallop; No lower extremity edema; Peripheral pulses are 2+ bilaterally  ABDOMEN: Nontender to palpation, normoactive bowel sounds, no rebound/guarding; No hepatosplenomegaly  MUSCULOSKELETAL: no clubbing or cyanosis of digits; no joint swelling or tenderness to palpation  PSYCH: A+O to person, place, and time; affect appropriate          LABS:              no new labs       RADIOLOGY & ADDITIONAL TESTS:    Imaging Personally Reviewed:    Consultant(s) Notes Reviewed:      Care Discussed with Consultants/Other Providers:

## 2024-01-10 RX ORDER — MECLIZINE HYDROCHLORIDE 25 MG/1
25 TABLET ORAL
Qty: 30 | Refills: 3 | Status: ACTIVE | COMMUNITY
Start: 2021-10-13 | End: 1900-01-01

## 2024-01-23 ENCOUNTER — APPOINTMENT (OUTPATIENT)
Dept: OTOLARYNGOLOGY | Facility: AMBULATORY SURGERY CENTER | Age: 69
End: 2024-01-23

## 2024-02-08 ENCOUNTER — APPOINTMENT (OUTPATIENT)
Dept: OTOLARYNGOLOGY | Facility: CLINIC | Age: 69
End: 2024-02-08
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
## 2024-03-03 NOTE — PHYSICAL EXAM
[Normal Appearance] : normal appearance [General Appearance - In No Acute Distress] : no acute distress [Edema] : no peripheral edema [Well Groomed] : well groomed [Respiration, Rhythm And Depth] : normal respiratory rhythm and effort [Abdomen Soft] : soft [Exaggerated Use Of Accessory Muscles For Inspiration] : no accessory muscle use [Costovertebral Angle Tenderness] : no ~M costovertebral angle tenderness [Abdomen Tenderness] : non-tender [Urinary Bladder Findings] : the bladder was normal on palpation [] : no rash [Normal Station and Gait] : the gait and station were normal for the patient's age [No Focal Deficits] : no focal deficits [Oriented To Time, Place, And Person] : oriented to person, place, and time [Affect] : the affect was normal [No Palpable Adenopathy] : no palpable adenopathy [Mood] : the mood was normal

## 2024-03-04 ENCOUNTER — APPOINTMENT (OUTPATIENT)
Dept: UROLOGY | Facility: CLINIC | Age: 69
End: 2024-03-04
Payer: COMMERCIAL

## 2024-03-04 VITALS
DIASTOLIC BLOOD PRESSURE: 71 MMHG | HEART RATE: 97 BPM | WEIGHT: 205 LBS | HEIGHT: 63 IN | SYSTOLIC BLOOD PRESSURE: 129 MMHG | OXYGEN SATURATION: 92 % | BODY MASS INDEX: 36.32 KG/M2

## 2024-03-04 DIAGNOSIS — N40.1 BENIGN PROSTATIC HYPERPLASIA WITH LOWER URINARY TRACT SYMPMS: ICD-10-CM

## 2024-03-04 DIAGNOSIS — R30.0 DYSURIA: ICD-10-CM

## 2024-03-04 DIAGNOSIS — N13.8 BENIGN PROSTATIC HYPERPLASIA WITH LOWER URINARY TRACT SYMPMS: ICD-10-CM

## 2024-03-04 PROCEDURE — G2211 COMPLEX E/M VISIT ADD ON: CPT | Mod: NC,1L

## 2024-03-04 PROCEDURE — 99204 OFFICE O/P NEW MOD 45 MIN: CPT

## 2024-03-04 RX ORDER — DOXYCYCLINE HYCLATE 100 MG/1
100 TABLET ORAL
Qty: 28 | Refills: 0 | Status: ACTIVE | COMMUNITY
Start: 2024-03-04 | End: 1900-01-01

## 2024-03-04 NOTE — REVIEW OF SYSTEMS
[see HPI] : see HPI [Negative] : Endocrine [Painful [place]] : painful [place] [Urine retention] : urine retention [Strain or push to urinate] : strain or push to urinate [Slow urine stream] : slow urine stream [Joint Pain] : joint pain [Joint Swelling] : joint swelling [Limb Swelling] : limb swelling [Difficulty Walking] : difficulty walking

## 2024-03-09 LAB
APPEARANCE: CLEAR
BACTERIA UR CULT: NORMAL
BACTERIA: NEGATIVE /HPF
BILIRUBIN URINE: NEGATIVE
BLOOD URINE: NEGATIVE
CAST: 2 /LPF
COLOR: NORMAL
EPITHELIAL CELLS: 1 /HPF
GLUCOSE QUALITATIVE U: NEGATIVE MG/DL
KETONES URINE: ABNORMAL MG/DL
LEUKOCYTE ESTERASE URINE: ABNORMAL
MICROSCOPIC-UA: NORMAL
NITRITE URINE: NEGATIVE
PH URINE: 5.5
PROTEIN URINE: 30 MG/DL
PSA FREE FLD-MCNC: 23 %
PSA FREE SERPL-MCNC: 4.05 NG/ML
PSA SERPL-MCNC: 17.3 NG/ML
RED BLOOD CELLS URINE: 1 /HPF
SPECIFIC GRAVITY URINE: 1.03
UROBILINOGEN URINE: 1 MG/DL
WHITE BLOOD CELLS URINE: 3 /HPF

## 2024-03-09 NOTE — ASSESSMENT
[FreeTextEntry1] : A urine sample was obtained today for culture and urinalysis.  The urine itself appeared fairly clear and I would be somewhat surprised if he was found to have an infection on culture.  However, he may have prostatitis resulting in the dysuria and I did elect to treat him empirically with doxycycline for that.  The proper use of doxycycline was reviewed.  The PSA level done today is 17.3 ng/mL.  Given his current symptoms and possible prostatitis I am going to have him return to the office in about 6 weeks to reassess the PSA level after the antibiotic course.  Depending on the results, we may pursue additional workup such as MRI of the prostate if the PSA does not decrease significantly.

## 2024-03-09 NOTE — HISTORY OF PRESENT ILLNESS
[FreeTextEntry1] : Andrez Thomas presents to the office today.  He is 68 years old and has history of reported BPH and bladder outlet obstruction with associated urinary symptoms.  He has been previously prescribed silodosin 8 mg which did help him a touch but he reports no major improvements.  He does feel as though his stream is fairly normal and he feels empty upon completion.  He denies nocturia.  He does report some mild dysuria at times, typically in the shaft and at the tip of the penis.  The patient also has history of PSA elevation and has undergone prostate MRI in the past and also reports having had a prostate biopsy and even multiple biopsies but most recently about 10 years ago.  The patient has no history of urinary retention or infections.  No history of bladder catheterization.

## 2024-03-09 NOTE — LETTER BODY
[Dear  ___] : Dear  [unfilled], [Please see my note below.] : Please see my note below. [Courtesy Letter:] : I had the pleasure of seeing your patient, [unfilled], in my office today. [FreeTextEntry3] : Sincerely,      Chuckie Judd MD, FACS Director of Urology Services, [facility] Chief of Urology, [facility]  of Urology   [facility], [place] [id number] P: [phone] F: [phone] [facility] [FreeTextEntry2] : Israel Mcgrath MD [address] [place] [id number]

## 2024-03-11 RX ORDER — METHOCARBAMOL 750 MG/1
750 TABLET, FILM COATED ORAL 3 TIMES DAILY
Qty: 90 | Refills: 3 | Status: DISCONTINUED | COMMUNITY
Start: 2023-04-27 | End: 2024-03-11

## 2024-03-11 RX ORDER — TRAMADOL HYDROCHLORIDE 50 MG/1
50 TABLET, COATED ORAL
Qty: 90 | Refills: 0 | Status: DISCONTINUED | COMMUNITY
Start: 2023-04-27 | End: 2024-03-11

## 2024-03-11 RX ORDER — TRAMADOL HYDROCHLORIDE 50 MG/1
50 TABLET, COATED ORAL
Qty: 10 | Refills: 0 | Status: DISCONTINUED | COMMUNITY
Start: 2023-09-19 | End: 2024-03-11

## 2024-03-19 ENCOUNTER — APPOINTMENT (OUTPATIENT)
Dept: NEUROSURGERY | Facility: CLINIC | Age: 69
End: 2024-03-19
Payer: COMMERCIAL

## 2024-03-19 VITALS
HEART RATE: 87 BPM | BODY MASS INDEX: 36.32 KG/M2 | OXYGEN SATURATION: 94 % | DIASTOLIC BLOOD PRESSURE: 83 MMHG | WEIGHT: 205 LBS | TEMPERATURE: 98.1 F | SYSTOLIC BLOOD PRESSURE: 135 MMHG | HEIGHT: 63 IN

## 2024-03-19 DIAGNOSIS — M54.16 RADICULOPATHY, LUMBAR REGION: ICD-10-CM

## 2024-03-19 DIAGNOSIS — M51.26 OTHER INTERVERTEBRAL DISC DISPLACEMENT, LUMBAR REGION: ICD-10-CM

## 2024-03-19 DIAGNOSIS — Z86.718 PERSONAL HISTORY OF OTHER VENOUS THROMBOSIS AND EMBOLISM: ICD-10-CM

## 2024-03-19 PROCEDURE — 99213 OFFICE O/P EST LOW 20 MIN: CPT

## 2024-03-19 NOTE — PHYSICAL EXAM
[General Appearance - Alert] : alert [Longitudinal] : longitudinal [General Appearance - In No Acute Distress] : in no acute distress [Dry] : dry [Clean] : clean [Well-Healed] : well-healed [Healing Well] : healing well [Sutures Intact] : closed with intact sutures [No Drainage] : without drainage [No Tenderness to Palpation] : no spine tenderness on palpation [Full ROM] : full ROM [No Pain with ROM] : no pain with motion in any direction [Straight-Leg Raise Test - Right] : straight leg raise of the right leg was positive [No Deficits] : no sensory deficits [5] : 5/5 Ankle Plantar Flexion (S1) [Intact] : all sensory within normal limits bilaterally

## 2024-03-21 PROBLEM — H81.11 BENIGN PAROXYSMAL POSITIONAL VERTIGO OF RIGHT EAR: Status: ACTIVE | Noted: 2022-01-28

## 2024-03-21 PROBLEM — R97.20 ELEVATED PSA: Status: ACTIVE | Noted: 2024-03-09

## 2024-03-21 NOTE — REVIEW OF SYSTEMS
[see HPI] : see HPI [Painful [place]] : painful [place] [Urine retention] : urine retention [Strain or push to urinate] : strain or push to urinate [Slow urine stream] : slow urine stream [Joint Pain] : joint pain [Joint Swelling] : joint swelling [Limb Swelling] : limb swelling [Difficulty Walking] : difficulty walking [Negative] : Heme/Lymph

## 2024-03-21 NOTE — HISTORY OF PRESENT ILLNESS
[FreeTextEntry1] : ABEBA SCHULTZ returns to the office today. He is a 68 year-old man who presented as a new patient earlier this month for BPH and bladder outlet obstruction with associated urinary symptoms.  At that time his PSA was 17.3ng/mL. He was prescribed Doxycycline for prostatits and is here today to repeat his PSA.    The patient also has history of PSA elevation and has undergone prostate MRI in the past and also reports having had a prostate biopsy and even multiple biopsies but most recently about 10 years ago.  The patient has no history of urinary retention or infections.  No history of bladder catheterization.

## 2024-03-22 NOTE — ASSESSMENT
[FreeTextEntry1] : 67 y/o, male, with a hx of lumbar degeneration s/p L4-L5 PLIF 04/23 with R leg pain. He had a fall in 12/2023 and was admitted for few days in the hospital. CT lumbar showed intact hardware. CTA LE with right thigh hematoma. Recent MRI at [facility] with L5-S1 foraminal stenosis. Will attempt injection by pain management for pain. Rx for xrays to r/o instability. F/u after injections and xrays completed.

## 2024-03-22 NOTE — RESULTS/DATA
[FreeTextEntry1] : IMPRESSION:  1. No change in posterior fusion hardware and cage device at L4-5. No motion at this level between flexion and extension. 2. Mild multilevel degenerative disc disease and moderate lower lumbar spine facet osteoarthrosis. 3. No vertebral body fracture or listhesis. 4. Moderate SI joint osteoarthrosis.

## 2024-03-22 NOTE — HISTORY OF PRESENT ILLNESS
[FreeTextEntry1] : 3/19/24: Mr. Archer is a 69 y/o, male, with a hx of lumbar degeneration s/p L4-L5 PLIF 04/23, here for f/u regarding R leg pain. He reports since last office visit he sustained a fall due to his leg pain and was hospitalized. CT lumbar spine with instrumentation intact. He rates his pain an 8/10.  No bowel or bladder dysfunction.   11/16/23: Mr. Archer is a 69 y/o, male, with a hx of lumbar degeneration s/p L4-L5 PLIF 04/23. Overall his L leg pain has improved however, x 4 months ago s/p PT he began to experience severe R leg pain radiating down to foot. He rates the pain an 8/10. Initially prior to surgery he had b/l leg pain, but worse on L. He has been taking tramadol for his pain. He is able to walk long distances despite of pain however, he is concerned as this was not present prior. Denies bowel/bladder dysfunction.   09/19/23: 69 y/o M with hx of L4-5 fusion. Patient presents c/o continued b/l LE pain after surgery. Patient denies back pain and states that overall his pain is much better after surgery and is alleviated with tramadol and methocarbamol. No bowel/bladder dysfunction.  04/27/23: Mr. Thomas is a 69 y/o, male, here for f/u post op L4-L5 fusion with posterior instrumentation 04/14/23. Post op pt experienced RLE DVT and has been taking eliquis 2.5 mg BID. He reports his RLE calve pain has much improved, denies any SOB, CP. Overall his back pain has improved. He continues to have numbness, tingling and sensitivity of thighs. He is having regular BM at home. He has been taking muscle relaxer and tramadol.  02/22/23: Mr. Archer is a 66 y/o, male with low back radiating to b/l LEs. His pain is worsened by lying or sitting long periods. The pain in his legs is his most bothersome symptom. His pain improves by walking. He has numbness of b/l thighs, and a burning sensation on his feet and toes. He has been taking tramadol for his symptoms. He denies urine/fecal incontinence.

## 2024-03-22 NOTE — REVIEW OF SYSTEMS
[As Noted in HPI] : as noted in HPI [Abnormal Sensation] : an abnormal sensation [Negative] : ENT [de-identified] : LBP, R leg pain

## 2024-03-28 ENCOUNTER — APPOINTMENT (OUTPATIENT)
Dept: UROLOGY | Facility: CLINIC | Age: 69
End: 2024-03-28

## 2024-03-28 DIAGNOSIS — H81.11 BENIGN PAROXYSMAL VERTIGO, RIGHT EAR: ICD-10-CM

## 2024-03-28 DIAGNOSIS — R97.20 ELEVATED PROSTATE, SPECIFIC ANTIGEN [PSA]: ICD-10-CM

## 2024-04-02 ENCOUNTER — OUTPATIENT (OUTPATIENT)
Dept: OUTPATIENT SERVICES | Facility: HOSPITAL | Age: 69
LOS: 1 days | End: 2024-04-02

## 2024-04-02 VITALS
WEIGHT: 205.91 LBS | DIASTOLIC BLOOD PRESSURE: 73 MMHG | TEMPERATURE: 97 F | SYSTOLIC BLOOD PRESSURE: 117 MMHG | RESPIRATION RATE: 16 BRPM | HEIGHT: 62 IN | HEART RATE: 75 BPM | OXYGEN SATURATION: 97 %

## 2024-04-02 DIAGNOSIS — I10 ESSENTIAL (PRIMARY) HYPERTENSION: ICD-10-CM

## 2024-04-02 DIAGNOSIS — Z98.890 OTHER SPECIFIED POSTPROCEDURAL STATES: Chronic | ICD-10-CM

## 2024-04-02 DIAGNOSIS — E11.9 TYPE 2 DIABETES MELLITUS WITHOUT COMPLICATIONS: ICD-10-CM

## 2024-04-02 DIAGNOSIS — H71.91 UNSPECIFIED CHOLESTEATOMA, RIGHT EAR: ICD-10-CM

## 2024-04-02 DIAGNOSIS — Z98.84 BARIATRIC SURGERY STATUS: Chronic | ICD-10-CM

## 2024-04-02 DIAGNOSIS — Z98.1 ARTHRODESIS STATUS: Chronic | ICD-10-CM

## 2024-04-02 DIAGNOSIS — G47.33 OBSTRUCTIVE SLEEP APNEA (ADULT) (PEDIATRIC): ICD-10-CM

## 2024-04-02 DIAGNOSIS — H71.90 UNSPECIFIED CHOLESTEATOMA, UNSPECIFIED EAR: ICD-10-CM

## 2024-04-02 LAB
A1C WITH ESTIMATED AVERAGE GLUCOSE RESULT: 5.7 % — HIGH (ref 4–5.6)
ANION GAP SERPL CALC-SCNC: 13 MMOL/L — SIGNIFICANT CHANGE UP (ref 7–14)
BUN SERPL-MCNC: 11 MG/DL — SIGNIFICANT CHANGE UP (ref 7–23)
CALCIUM SERPL-MCNC: 9 MG/DL — SIGNIFICANT CHANGE UP (ref 8.4–10.5)
CHLORIDE SERPL-SCNC: 108 MMOL/L — HIGH (ref 98–107)
CO2 SERPL-SCNC: 20 MMOL/L — LOW (ref 22–31)
CREAT SERPL-MCNC: 0.94 MG/DL — SIGNIFICANT CHANGE UP (ref 0.5–1.3)
EGFR: 87 ML/MIN/1.73M2 — SIGNIFICANT CHANGE UP
ESTIMATED AVERAGE GLUCOSE: 117 — SIGNIFICANT CHANGE UP
GLUCOSE SERPL-MCNC: 89 MG/DL — SIGNIFICANT CHANGE UP (ref 70–99)
HCT VFR BLD CALC: 48.9 % — SIGNIFICANT CHANGE UP (ref 39–50)
HGB BLD-MCNC: 16.2 G/DL — SIGNIFICANT CHANGE UP (ref 13–17)
MCHC RBC-ENTMCNC: 29.5 PG — SIGNIFICANT CHANGE UP (ref 27–34)
MCHC RBC-ENTMCNC: 33.1 GM/DL — SIGNIFICANT CHANGE UP (ref 32–36)
MCV RBC AUTO: 88.9 FL — SIGNIFICANT CHANGE UP (ref 80–100)
NRBC # BLD: 0 /100 WBCS — SIGNIFICANT CHANGE UP (ref 0–0)
NRBC # FLD: 0 K/UL — SIGNIFICANT CHANGE UP (ref 0–0)
PLATELET # BLD AUTO: 173 K/UL — SIGNIFICANT CHANGE UP (ref 150–400)
POTASSIUM SERPL-MCNC: 4.1 MMOL/L — SIGNIFICANT CHANGE UP (ref 3.5–5.3)
POTASSIUM SERPL-SCNC: 4.1 MMOL/L — SIGNIFICANT CHANGE UP (ref 3.5–5.3)
RBC # BLD: 5.5 M/UL — SIGNIFICANT CHANGE UP (ref 4.2–5.8)
RBC # FLD: 13.2 % — SIGNIFICANT CHANGE UP (ref 10.3–14.5)
SODIUM SERPL-SCNC: 141 MMOL/L — SIGNIFICANT CHANGE UP (ref 135–145)
WBC # BLD: 5.71 K/UL — SIGNIFICANT CHANGE UP (ref 3.8–10.5)
WBC # FLD AUTO: 5.71 K/UL — SIGNIFICANT CHANGE UP (ref 3.8–10.5)

## 2024-04-02 RX ORDER — ZOLPIDEM TARTRATE 10 MG/1
1 TABLET ORAL
Refills: 0 | DISCHARGE

## 2024-04-02 NOTE — H&P PST ADULT - NSICDXPASTMEDICALHX_GEN_ALL_CORE_FT
PAST MEDICAL HISTORY:  Cholesteatoma     Chronic back pain     Chronic mastoiditis right ear    Deep vein thrombosis (DVT) of left lower extremity     Deep vein thrombosis (DVT) of right lower extremity     Diabetes     H/O radiculopathy     History of BPH     HLD (hyperlipidemia)     HTN (hypertension)     Lumbar herniated disc     HAMIDA (obstructive sleep apnea)     Other abnormal auditory perceptions, right ear     Other fall     Superficial vein thrombosis     Vertigo

## 2024-04-02 NOTE — H&P PST ADULT - NEUROLOGICAL COMMENTS
After fall in 12/2023 developed numbness and pain in lower extremities after standing more than 10 minutes, per neuro hardware in is proper alignment

## 2024-04-02 NOTE — H&P PST ADULT - MUSCULOSKELETAL COMMENTS
Reports fall at work 12/2023, injuring back and sustaining a right thigh hematoma, venous doppler noted a superficial vein thrombosis at the time, per neurosurgeon spinal hardware in place

## 2024-04-02 NOTE — H&P PST ADULT - NEGATIVE ENMT SYMPTOMS
no hearing difficulty/no ear pain/no sinus symptoms/no nasal congestion/no nasal discharge/no dry mouth/no throat pain/no dysphagia

## 2024-04-02 NOTE — H&P PST ADULT - CARDIOVASCULAR
regular rate and rhythm/S1 S2 present negative II/regular rate and rhythm/S1 S2 present/no pedal edema/murmur

## 2024-04-02 NOTE — H&P PST ADULT - HEMATOLOGY/LYMPHATICS COMMENTS
reports LLE in 2019 treated with Eliquis, in 4/2023 developed RLE DVT after spinal fusion surgery and took anticoagulant for two months, in 12/2023 after fall and sustaining hematoma states he received treatment to dissolve hematoma

## 2024-04-02 NOTE — H&P PST ADULT - MUSCULOSKELETAL
no joint erythema/no joint warmth/no calf tenderness/decreased ROM/decreased ROM due to pain/strength 5/5 bilateral upper extremities/back exam details…

## 2024-04-02 NOTE — H&P PST ADULT - PROBLEM SELECTOR PLAN 1
Patient scheduled for surgery on: 4/4/24  Provided with verbal and written presurgical instructions  Verbalized understanding  with teach back on the following: Famotidine for GI prophylaxis     Lab specimen drawn at PST today: cbc, bmp, hga1c    In chart EKG, Echo and venous doppler

## 2024-04-02 NOTE — H&P PST ADULT - HISTORY OF PRESENT ILLNESS
68 yr old male presents with medical hx of HTN, HLD, DVT, diabetes- non-compliant with medical regimen, scheduled for for right canal wall down tympanomastoidectomy, meatoplasty, fat/ fascia graft, cartilage graft  68 yr old male presents with medical hx of HTN, HLD, DVT, diabetes- non-compliant with medical regimen, scheduled for right canal wall down tympanomastoidectomy, meatoplasty, fat/ fascia graft, cartilage graft

## 2024-04-02 NOTE — H&P PST ADULT - NSICDXPASTSURGICALHX_GEN_ALL_CORE_FT
PAST SURGICAL HISTORY:  H/O prostate biopsy     H/O spinal fusion     History of umbilical hernia repair x2    History of weight loss surgery 5 years ago in [place]    S/P shoulder surgery left shoulder    S/P UPPP (uvulopalatopharyngoplasty) >5 years ago

## 2024-04-02 NOTE — H&P PST ADULT - NSICDXFAMILYHX_GEN_ALL_CORE_FT
FAMILY HISTORY:  FH: asthma, daughter, brother    Sibling  Still living? Yes, Estimated age: Age Unknown  FH: heart disease, Age at diagnosis: Age Unknown

## 2024-04-02 NOTE — H&P PST ADULT - SPO2 (%)
You were seen in the ENT Clinic today by Dr. Mitchell  If you have any questions or concerns after your appointment, please call   -  ENT Clinic: [phone] scheduling option 1 and nurse advice option 3.  -  Please follow up with Dr. Mitchell as needed.         Thank you for choosing [facility] and allowing us to be apart of your care team!  Virginia Lucas LPN         97

## 2024-04-03 ENCOUNTER — TRANSCRIPTION ENCOUNTER (OUTPATIENT)
Age: 69
End: 2024-04-03

## 2024-04-03 VITALS
RESPIRATION RATE: 16 BRPM | HEIGHT: 62 IN | WEIGHT: 205.91 LBS | SYSTOLIC BLOOD PRESSURE: 130 MMHG | TEMPERATURE: 98 F | DIASTOLIC BLOOD PRESSURE: 78 MMHG | OXYGEN SATURATION: 95 % | HEART RATE: 68 BPM

## 2024-04-03 NOTE — ASU PREOPERATIVE ASSESSMENT, ADULT ([place] ONLY) - FALL HARM RISK - UNIVERSAL INTERVENTIONS
Bed in lowest position, wheels locked, appropriate side rails in place/Call bell, personal items and telephone in reach/Instruct patient to call for assistance before getting out of bed or chair/Non-slip footwear when patient is out of bed/[place] to call system/Physically safe environment - no spills, clutter or unnecessary equipment/Purposeful Proactive Rounding/Room/bathroom lighting operational, light cord in reach

## 2024-04-04 ENCOUNTER — RESULT REVIEW (OUTPATIENT)
Age: 69
End: 2024-04-04

## 2024-04-04 ENCOUNTER — TRANSCRIPTION ENCOUNTER (OUTPATIENT)
Age: 69
End: 2024-04-04

## 2024-04-04 ENCOUNTER — OUTPATIENT (OUTPATIENT)
Dept: OUTPATIENT SERVICES | Facility: HOSPITAL | Age: 69
LOS: 1 days | Discharge: HOME | End: 2024-04-04
Payer: MEDICARE

## 2024-04-04 ENCOUNTER — APPOINTMENT (OUTPATIENT)
Dept: OTOLARYNGOLOGY | Facility: AMBULATORY SURGERY CENTER | Age: 69
End: 2024-04-04

## 2024-04-04 VITALS
OXYGEN SATURATION: 97 % | HEART RATE: 84 BPM | RESPIRATION RATE: 20 BRPM | TEMPERATURE: 97 F | DIASTOLIC BLOOD PRESSURE: 66 MMHG | SYSTOLIC BLOOD PRESSURE: 117 MMHG

## 2024-04-04 DIAGNOSIS — Z98.84 BARIATRIC SURGERY STATUS: Chronic | ICD-10-CM

## 2024-04-04 DIAGNOSIS — Z98.1 ARTHRODESIS STATUS: Chronic | ICD-10-CM

## 2024-04-04 DIAGNOSIS — Z98.890 OTHER SPECIFIED POSTPROCEDURAL STATES: Chronic | ICD-10-CM

## 2024-04-04 DIAGNOSIS — H71.91 UNSPECIFIED CHOLESTEATOMA, RIGHT EAR: ICD-10-CM

## 2024-04-04 PROCEDURE — 69646 REVISE MIDDLE EAR & MASTOID: CPT

## 2024-04-04 PROCEDURE — 69310 REBUILD OUTER EAR CANAL: CPT | Mod: 59

## 2024-04-04 PROCEDURE — 88304 TISSUE EXAM BY PATHOLOGIST: CPT | Mod: 26

## 2024-04-04 PROCEDURE — 92516 FACIAL NERVE FUNCTION TEST: CPT

## 2024-04-04 PROCEDURE — 88300 SURGICAL PATH GROSS: CPT | Mod: 26,59

## 2024-04-04 PROCEDURE — 21235 EAR CARTILAGE GRAFT: CPT

## 2024-04-04 DEVICE — SURGIFOAM PAD 8CM X 12.5CM X 2MM (100C): Type: IMPLANTABLE DEVICE | Site: RIGHT | Status: FUNCTIONAL

## 2024-04-04 DEVICE — SHTXOMED 0.005X13: Type: IMPLANTABLE DEVICE | Site: RIGHT | Status: FUNCTIONAL

## 2024-04-04 DEVICE — BONE WAX 2.5GM: Type: IMPLANTABLE DEVICE | Site: RIGHT | Status: FUNCTIONAL

## 2024-04-04 RX ORDER — AMLODIPINE AND ATORVASTATIN 5; 20 MG/1; MG/1
1 TABLET, FILM COATED ORAL
Qty: 0 | Refills: 0 | DISCHARGE

## 2024-04-04 RX ORDER — OFLOXACIN OTIC SOLUTION 3 MG/ML
5 SOLUTION/ DROPS AURICULAR (OTIC)
Qty: 1 | Refills: 0
Start: 2024-04-04 | End: 2024-05-03

## 2024-04-04 RX ORDER — AMOXICILLIN 250 MG/5ML
1 SUSPENSION, RECONSTITUTED, ORAL (ML) ORAL
Qty: 14 | Refills: 0
Start: 2024-04-04 | End: 2024-04-10

## 2024-04-04 RX ORDER — OXYCODONE HYDROCHLORIDE 5 MG/1
1 TABLET ORAL
Qty: 10 | Refills: 0
Start: 2024-04-04

## 2024-04-04 RX ORDER — SILODOSIN 4 MG/1
1 CAPSULE ORAL
Refills: 0 | DISCHARGE

## 2024-04-04 RX ORDER — FAMOTIDINE 10 MG/ML
1 INJECTION INTRAVENOUS
Refills: 0 | DISCHARGE

## 2024-04-04 NOTE — ASU DISCHARGE PLAN (ADULT/PEDIATRIC) - NURSING INSTRUCTIONS
Progress to regular diet as tolerated.  Keep well hydrated.     Next dose of Tylenol may be taken at 4pm

## 2024-04-04 NOTE — ASU DISCHARGE PLAN (ADULT/PEDIATRIC) - ASU DC SPECIAL INSTRUCTIONSFT
Regular diet  Ofloxacin drops to right ear- continue until follow up appointment as prescribed  Tylenol and motrin for pain  Oxycodone as needed for severe pain  Amoxicillin - take as prescribed  Please follow post op intruction sheet  Call Dr. Torres's Office for follow up appointment

## 2024-04-04 NOTE — ASU DISCHARGE PLAN (ADULT/PEDIATRIC) - CARE PROVIDER_API CALL
James Torres  Otolaryngology  [address] [place] [address], [address]  [place] [address]  Phone: [phone]  Fax: [phone]  Follow Up Time:

## 2024-04-04 NOTE — BRIEF OPERATIVE NOTE - NSICDXBRIEFPOSTOP_GEN_ALL_CORE_FT
POST-OP DIAGNOSIS:  Recurrent cholesteatoma of mastoid cavity 04-Apr-2024 10:32:56  Golden Pascual

## 2024-04-10 LAB — SURGICAL PATHOLOGY STUDY: SIGNIFICANT CHANGE UP

## 2024-04-11 NOTE — PATIENT PROFILE ADULT - CENTRAL VENOUS CATHETER/PICC LINE
due to incorrect word recognition.  If further clarification is needed please contact the office at [phone]  
no

## 2024-04-15 PROBLEM — I82.401 ACUTE EMBOLISM AND THROMBOSIS OF UNSPECIFIED DEEP VEINS OF RIGHT LOWER EXTREMITY: Chronic | Status: ACTIVE | Noted: 2024-04-02

## 2024-04-15 PROBLEM — E11.9 TYPE 2 DIABETES MELLITUS WITHOUT COMPLICATIONS: Chronic | Status: ACTIVE | Noted: 2024-04-02

## 2024-04-15 PROBLEM — H93.291 OTHER ABNORMAL AUDITORY PERCEPTIONS, RIGHT EAR: Chronic | Status: ACTIVE | Noted: 2024-04-02

## 2024-04-15 PROBLEM — I82.890 ACUTE EMBOLISM AND THROMBOSIS OF OTHER SPECIFIED VEINS: Chronic | Status: ACTIVE | Noted: 2024-04-02

## 2024-04-15 PROBLEM — H71.90 UNSPECIFIED CHOLESTEATOMA, UNSPECIFIED EAR: Chronic | Status: ACTIVE | Noted: 2024-04-02

## 2024-04-15 PROBLEM — I82.402 ACUTE EMBOLISM AND THROMBOSIS OF UNSPECIFIED DEEP VEINS OF LEFT LOWER EXTREMITY: Chronic | Status: ACTIVE | Noted: 2024-04-02

## 2024-04-15 PROBLEM — Z87.39 PERSONAL HISTORY OF OTHER DISEASES OF THE MUSCULOSKELETAL SYSTEM AND CONNECTIVE TISSUE: Chronic | Status: ACTIVE | Noted: 2024-04-02

## 2024-04-15 PROBLEM — G47.33 OBSTRUCTIVE SLEEP APNEA (ADULT) (PEDIATRIC): Chronic | Status: ACTIVE | Noted: 2024-04-02

## 2024-04-15 PROBLEM — Z87.438 PERSONAL HISTORY OF OTHER DISEASES OF MALE GENITAL ORGANS: Chronic | Status: ACTIVE | Noted: 2024-04-02

## 2024-04-15 PROBLEM — M54.9 DORSALGIA, UNSPECIFIED: Chronic | Status: ACTIVE | Noted: 2024-04-02

## 2024-04-15 PROBLEM — R42 DIZZINESS AND GIDDINESS: Chronic | Status: ACTIVE | Noted: 2024-04-02

## 2024-04-15 PROBLEM — R29.6 REPEATED FALLS: Chronic | Status: ACTIVE | Noted: 2024-04-02

## 2024-04-17 ENCOUNTER — APPOINTMENT (OUTPATIENT)
Dept: OTOLARYNGOLOGY | Facility: CLINIC | Age: 69
End: 2024-04-17
Payer: COMMERCIAL

## 2024-04-17 VITALS
SYSTOLIC BLOOD PRESSURE: 107 MMHG | BODY MASS INDEX: 49.61 KG/M2 | HEART RATE: 90 BPM | DIASTOLIC BLOOD PRESSURE: 67 MMHG | HEIGHT: 63 IN | WEIGHT: 280 LBS

## 2024-04-17 PROCEDURE — 99024 POSTOP FOLLOW-UP VISIT: CPT

## 2024-04-17 NOTE — ASSESSMENT
[FreeTextEntry1] : Exam shows a widely patent meatoplasty, well-healed postauricular incision, normal bilateral facial nerve function.  Right canal wall down cavity slowly epithelializing.  Topical powder and topical steroid cream applied, packing removed in entirety, maintain dry ear precautions and follow-up 2 to 3 weeks.

## 2024-04-17 NOTE — HISTORY OF PRESENT ILLNESS
[de-identified] : 68 year old male with history of recurrent cholesteatoma and mixed hearing loss presents for follow up.  s/p Right canal wall down tympanoplasty with mastoidectomy, ossicular reconstruction using type 3 tympanoplasty, cartilage graft, meatoplasty 4/4/24.  States doing well since procedure.  States hearing has improved.  No recent fevers or otorrhea.

## 2024-04-17 NOTE — REVIEW OF SYSTEMS
[de-identified] : as per HPI
Airway patent, Nasal mucosa clear. Mouth with normal mucosa. Throat has no vesicles, no oropharyngeal exudates and uvula is midline.

## 2024-04-21 NOTE — PATIENT PROFILE ADULT - FALL HARM RISK - PATIENT NEEDS ASSISTANCE
Principal Discharge DX:	Headache  Secondary Diagnosis:	Vertebral artery aneurysm   1 No assistance needed

## 2024-05-07 ENCOUNTER — APPOINTMENT (OUTPATIENT)
Dept: OTOLARYNGOLOGY | Facility: CLINIC | Age: 69
End: 2024-05-07
Payer: MEDICARE

## 2024-05-07 VITALS
HEART RATE: 91 BPM | HEIGHT: 63 IN | BODY MASS INDEX: 37.21 KG/M2 | SYSTOLIC BLOOD PRESSURE: 137 MMHG | DIASTOLIC BLOOD PRESSURE: 76 MMHG | WEIGHT: 210 LBS

## 2024-05-07 PROCEDURE — 99024 POSTOP FOLLOW-UP VISIT: CPT

## 2024-05-07 NOTE — ASSESSMENT
[FreeTextEntry1] : Exam today shows a mostly dry slowly epithelializing right canal wall down cavity.  Minor epithelial adhesion extending from anterior tympanic membrane reconstruction to anterior superior mastoid bowl.  Also with posterior adhesion which does not obscure region over the lateral semicircular canal.  Postauricular incision well-healed, bilateral facial nerve function normal.  Topical betamethasone cream applied along non-epithelialized edges of canal wall down cavity as well as topical ciprofloxacin/dexamethasone/clotrimazole/boric acid powder, maintain dry ear precautions, follow-up in 1 month.

## 2024-05-07 NOTE — HISTORY OF PRESENT ILLNESS
[de-identified] : 68 y/o M presents for post operative visit s/p right CWD tympanoplasty with mastoidectomy 4/8/24 patient reports that he is doing well overall since last visit denies any new issues with pain; occasional right ear drainage

## 2024-05-16 ENCOUNTER — APPOINTMENT (OUTPATIENT)
Dept: NEUROSURGERY | Facility: CLINIC | Age: 69
End: 2024-05-16
Payer: MEDICARE

## 2024-05-16 VITALS
BODY MASS INDEX: 37.21 KG/M2 | SYSTOLIC BLOOD PRESSURE: 128 MMHG | WEIGHT: 210 LBS | OXYGEN SATURATION: 95 % | HEIGHT: 63 IN | HEART RATE: 80 BPM | TEMPERATURE: 98.4 F | DIASTOLIC BLOOD PRESSURE: 76 MMHG

## 2024-05-16 DIAGNOSIS — M54.50 LOW BACK PAIN, UNSPECIFIED: ICD-10-CM

## 2024-05-16 DIAGNOSIS — Z98.1 ARTHRODESIS STATUS: ICD-10-CM

## 2024-05-16 DIAGNOSIS — G89.29 LOW BACK PAIN, UNSPECIFIED: ICD-10-CM

## 2024-05-16 DIAGNOSIS — M47.816 SPONDYLOSIS W/OUT MYELOPATHY OR RADICULOPATHY, LUMBAR REGION: ICD-10-CM

## 2024-05-16 PROCEDURE — 99213 OFFICE O/P EST LOW 20 MIN: CPT

## 2024-05-17 PROBLEM — M54.50 CHRONIC BILATERAL LOW BACK PAIN WITHOUT SCIATICA: Status: ACTIVE | Noted: 2024-05-17

## 2024-05-17 PROBLEM — Z98.1 HISTORY OF LUMBAR FUSION: Status: ACTIVE | Noted: 2023-04-27

## 2024-05-17 PROBLEM — M47.816 LUMBAR SPONDYLOSIS: Status: ACTIVE | Noted: 2024-05-17

## 2024-05-17 NOTE — PHYSICAL EXAM
[General Appearance - Alert] : alert [General Appearance - In No Acute Distress] : in no acute distress [Longitudinal] : longitudinal [Clean] : clean [Dry] : dry [Healing Well] : healing well [Well-Healed] : well-healed [Sutures Intact] : closed with intact sutures [No Drainage] : without drainage [Oriented To Time, Place, And Person] : oriented to person, place, and time [Impaired Insight] : insight and judgment were intact [No Tenderness to Palpation] : no spine tenderness on palpation [Full ROM] : full ROM [No Pain with ROM] : no pain with motion in any direction [Straight-Leg Raise Test - Right] : straight leg raise of the right leg was positive [No Deficits] : no sensory deficits [5] : 5/5 Ankle Plantar Flexion (S1) [Intact] : all sensory within normal limits bilaterally [] : no respiratory distress [Abnormal Walk] : normal gait

## 2024-05-22 NOTE — HISTORY OF PRESENT ILLNESS
[FreeTextEntry1] : 5/16/24: Mr. Archer is a 67 y/o, male, with a hx of lumbar degeneration s/p L4-L5 PLIF 04/23, here for f/u regarding R leg pain. He reports that he has had PT and chiropractic adjustment with moderate relief. He takes Tramadol as needed.  3/19/24: Mr. Archer is a 67 y/o, male, with a hx of lumbar degeneration s/p L4-L5 PLIF 04/23, here for f/u regarding R leg pain. He reports since last office visit he sustained a fall due to his leg pain and was hospitalized. CT lumbar spine with instrumentation intact. He rates his pain an 8/10.  No bowel or bladder dysfunction.   11/16/23: Mr. Archer is a 67 y/o, male, with a hx of lumbar degeneration s/p L4-L5 PLIF 04/23. Overall his L leg pain has improved however, x 4 months ago s/p PT he began to experience severe R leg pain radiating down to foot. He rates the pain an 8/10. Initially prior to surgery he had b/l leg pain, but worse on L. He has been taking tramadol for his pain. He is able to walk long distances despite of pain however, he is concerned as this was not present prior. Denies bowel/bladder dysfunction.   09/19/23: 67 y/o M with hx of L4-5 fusion. Patient presents c/o continued b/l LE pain after surgery. Patient denies back pain and states that overall his pain is much better after surgery and is alleviated with tramadol and methocarbamol. No bowel/bladder dysfunction.  04/27/23: Mr. Thomas is a 67 y/o, male, here for f/u post op L4-L5 fusion with posterior instrumentation 04/14/23. Post op pt experienced RLE DVT and has been taking eliquis 2.5 mg BID. He reports his RLE calve pain has much improved, denies any SOB, CP. Overall his back pain has improved. He continues to have numbness, tingling and sensitivity of thighs. He is having regular BM at home. He has been taking muscle relaxer and tramadol.  02/22/23: Mr. Archer is a 68 y/o, male with low back radiating to b/l LEs. His pain is worsened by lying or sitting long periods. The pain in his legs is his most bothersome symptom. His pain improves by walking. He has numbness of b/l thighs, and a burning sensation on his feet and toes. He has been taking tramadol for his symptoms. He denies urine/fecal incontinence.

## 2024-05-22 NOTE — REVIEW OF SYSTEMS
[As Noted in HPI] : as noted in HPI [Abnormal Sensation] : an abnormal sensation [Negative] : ENT [de-identified] : LBP, R leg pain

## 2024-05-22 NOTE — DATA REVIEWED
[de-identified] : LUMBAR XRAY 4/1/24 Stable Hardware; generalized osteopenia and degenerative spondylosis

## 2024-06-05 ENCOUNTER — APPOINTMENT (OUTPATIENT)
Dept: OTOLARYNGOLOGY | Facility: CLINIC | Age: 69
End: 2024-06-05
Payer: MEDICARE

## 2024-06-05 DIAGNOSIS — H92.01 OTALGIA, RIGHT EAR: ICD-10-CM

## 2024-06-05 DIAGNOSIS — H70.11 CHRONIC MASTOIDITIS, RIGHT EAR: ICD-10-CM

## 2024-06-05 DIAGNOSIS — H90.5 UNSPECIFIED SENSORINEURAL HEARING LOSS: ICD-10-CM

## 2024-06-05 DIAGNOSIS — H71.91 UNSPECIFIED CHOLESTEATOMA, RIGHT EAR: ICD-10-CM

## 2024-06-05 DIAGNOSIS — H93.291 OTHER ABNORMAL AUDITORY PERCEPTIONS, RIGHT EAR: ICD-10-CM

## 2024-06-05 DIAGNOSIS — H92.11 OTORRHEA, RIGHT EAR: ICD-10-CM

## 2024-06-05 PROCEDURE — 99024 POSTOP FOLLOW-UP VISIT: CPT

## 2024-06-05 NOTE — HISTORY OF PRESENT ILLNESS
[de-identified] : 69 year old male presents for post op visit reports that he is doing well since last visit s/p right CWD tympanoplasty with mastoidectomy 4/8/24. Maintaining dry ear precautions Denies otalgia, otorrhea, ear infections, hearing loss, tinnitus, dizziness, vertigo, headaches related to hearing.

## 2024-06-05 NOTE — ASSESSMENT
[FreeTextEntry1] : Exam today shows epithelial web partially obscuring posterior mastoid bowl which was debrided.  Bilateral facial nerve function normal.  Still able to clean out debris overlying lateral semicircular canal.  Topical powder reapplied, topical steroid reapplied.  Maintain dry ear precautions, follow-up 6 weeks with postoperative audiogram.

## 2024-07-17 ENCOUNTER — APPOINTMENT (OUTPATIENT)
Dept: OTOLARYNGOLOGY | Facility: CLINIC | Age: 69
End: 2024-07-17

## 2024-08-21 ENCOUNTER — NON-APPOINTMENT (OUTPATIENT)
Age: 69
End: 2024-08-21

## 2024-08-22 ENCOUNTER — APPOINTMENT (OUTPATIENT)
Dept: NEUROSURGERY | Facility: CLINIC | Age: 69
End: 2024-08-22

## 2024-08-22 VITALS
RESPIRATION RATE: 18 BRPM | DIASTOLIC BLOOD PRESSURE: 70 MMHG | OXYGEN SATURATION: 95 % | TEMPERATURE: 97.7 F | SYSTOLIC BLOOD PRESSURE: 120 MMHG | BODY MASS INDEX: 37.2 KG/M2 | WEIGHT: 210 LBS | HEART RATE: 88 BPM

## 2024-08-22 DIAGNOSIS — M54.16 RADICULOPATHY, LUMBAR REGION: ICD-10-CM

## 2024-08-22 DIAGNOSIS — Z98.1 ARTHRODESIS STATUS: ICD-10-CM

## 2024-08-22 PROCEDURE — 99213 OFFICE O/P EST LOW 20 MIN: CPT

## 2024-08-23 NOTE — ASSESSMENT
[FreeTextEntry1] : Mr. Archer is a 69 y/o, male, with a hx of lumbar degeneration s/p L4-L5 PLIF 04/23. c/o R lower back pain, right leg pain. Continues with PT and had an epidural injection last month with relief x 3 days. Recent MRI hip with R hip bursitis. Last MRI lumbar spine 01/2024 with L5-S1 stenosis. CT and x-ray lumbar spine were done this year as well. Neurological exam stable. I want him to get a transforaminal block or a RFL to see if his symptoms improve.

## 2024-08-23 NOTE — REVIEW OF SYSTEMS
[As Noted in HPI] : as noted in HPI [Abnormal Sensation] : an abnormal sensation [Negative] : ENT [de-identified] : LBP, R leg pain

## 2024-08-23 NOTE — DATA REVIEWED
[de-identified] : LUMBAR XRAY 4/1/24 Stable Hardware; generalized osteopenia and degenerative spondylosis

## 2024-08-23 NOTE — DATA REVIEWED
[de-identified] : LUMBAR XRAY 4/1/24 Stable Hardware; generalized osteopenia and degenerative spondylosis

## 2024-08-23 NOTE — ASSESSMENT
[FreeTextEntry1] : Mr. Archer is a 67 y/o, male, with a hx of lumbar degeneration s/p L4-L5 PLIF 04/23. c/o R lower back pain, right leg pain. Continues with PT and had an epidural injection last month with relief x 3 days. Recent MRI hip with R hip bursitis. Last MRI lumbar spine 01/2024 with L5-S1 stenosis. CT and x-ray lumbar spine were done this year as well. Neurological exam stable. I want him to get a transforaminal block or a RFL to see if his symptoms improve.

## 2024-08-23 NOTE — HISTORY OF PRESENT ILLNESS
[FreeTextEntry1] : 8/22/24: Mr. Archer is a 69 y/o, male, with a hx of lumbar degeneration s/p L4-L5 PLIF 04/23, here for f/u. He reports continued R lower back pain 8/10, radiating to right lower extremity. He is continuing physical therapy and seeing pain management. He had an injection last month which helped him for 3 days only. He had an MRI of the right hip per pain management showing no acute fracture or dislocation, findings c/w right sided trochanteric bursitis.   5/16/24: Mr. Archer is a 69 y/o, male, with a hx of lumbar degeneration s/p L4-L5 PLIF 04/23, here for f/u regarding R leg pain. He reports that he has had PT and chiropractic adjustment with moderate relief. He takes Tramadol as needed.  3/19/24: Mr. Archer is a 69 y/o, male, with a hx of lumbar degeneration s/p L4-L5 PLIF 04/23, here for f/u regarding R leg pain. He reports since last office visit he sustained a fall due to his leg pain and was hospitalized. CT lumbar spine with instrumentation intact. He rates his pain an 8/10.  No bowel or bladder dysfunction.   11/16/23: Mr. Archer is a 69 y/o, male, with a hx of lumbar degeneration s/p L4-L5 PLIF 04/23. Overall his L leg pain has improved however, x 4 months ago s/p PT he began to experience severe R leg pain radiating down to foot. He rates the pain an 8/10. Initially prior to surgery he had b/l leg pain, but worse on L. He has been taking tramadol for his pain. He is able to walk long distances despite of pain however, he is concerned as this was not present prior. Denies bowel/bladder dysfunction.   09/19/23: 69 y/o M with hx of L4-5 fusion. Patient presents c/o continued b/l LE pain after surgery. Patient denies back pain and states that overall his pain is much better after surgery and is alleviated with tramadol and methocarbamol. No bowel/bladder dysfunction.  04/27/23: Mr. Thomas is a 69 y/o, male, here for f/u post op L4-L5 fusion with posterior instrumentation 04/14/23. Post op pt experienced RLE DVT and has been taking eliquis 2.5 mg BID. He reports his RLE calve pain has much improved, denies any SOB, CP. Overall his back pain has improved. He continues to have numbness, tingling and sensitivity of thighs. He is having regular BM at home. He has been taking muscle relaxer and tramadol.  02/22/23: Mr. Archer is a 68 y/o, male with low back radiating to b/l LEs. His pain is worsened by lying or sitting long periods. The pain in his legs is his most bothersome symptom. His pain improves by walking. He has numbness of b/l thighs, and a burning sensation on his feet and toes. He has been taking tramadol for his symptoms. He denies urine/fecal incontinence.

## 2024-08-23 NOTE — HISTORY OF PRESENT ILLNESS
[FreeTextEntry1] : 8/22/24: Mr. Archer is a 67 y/o, male, with a hx of lumbar degeneration s/p L4-L5 PLIF 04/23, here for f/u. He reports continued R lower back pain 8/10, radiating to right lower extremity. He is continuing physical therapy and seeing pain management. He had an injection last month which helped him for 3 days only. He had an MRI of the right hip per pain management showing no acute fracture or dislocation, findings c/w right sided trochanteric bursitis.   5/16/24: Mr. Archer is a 67 y/o, male, with a hx of lumbar degeneration s/p L4-L5 PLIF 04/23, here for f/u regarding R leg pain. He reports that he has had PT and chiropractic adjustment with moderate relief. He takes Tramadol as needed.  3/19/24: Mr. Archer is a 67 y/o, male, with a hx of lumbar degeneration s/p L4-L5 PLIF 04/23, here for f/u regarding R leg pain. He reports since last office visit he sustained a fall due to his leg pain and was hospitalized. CT lumbar spine with instrumentation intact. He rates his pain an 8/10.  No bowel or bladder dysfunction.   11/16/23: Mr. Archer is a 67 y/o, male, with a hx of lumbar degeneration s/p L4-L5 PLIF 04/23. Overall his L leg pain has improved however, x 4 months ago s/p PT he began to experience severe R leg pain radiating down to foot. He rates the pain an 8/10. Initially prior to surgery he had b/l leg pain, but worse on L. He has been taking tramadol for his pain. He is able to walk long distances despite of pain however, he is concerned as this was not present prior. Denies bowel/bladder dysfunction.   09/19/23: 67 y/o M with hx of L4-5 fusion. Patient presents c/o continued b/l LE pain after surgery. Patient denies back pain and states that overall his pain is much better after surgery and is alleviated with tramadol and methocarbamol. No bowel/bladder dysfunction.  04/27/23: Mr. Thomas is a 67 y/o, male, here for f/u post op L4-L5 fusion with posterior instrumentation 04/14/23. Post op pt experienced RLE DVT and has been taking eliquis 2.5 mg BID. He reports his RLE calve pain has much improved, denies any SOB, CP. Overall his back pain has improved. He continues to have numbness, tingling and sensitivity of thighs. He is having regular BM at home. He has been taking muscle relaxer and tramadol.  02/22/23: Mr. Archer is a 66 y/o, male with low back radiating to b/l LEs. His pain is worsened by lying or sitting long periods. The pain in his legs is his most bothersome symptom. His pain improves by walking. He has numbness of b/l thighs, and a burning sensation on his feet and toes. He has been taking tramadol for his symptoms. He denies urine/fecal incontinence.

## 2024-08-23 NOTE — REVIEW OF SYSTEMS
[As Noted in HPI] : as noted in HPI [Abnormal Sensation] : an abnormal sensation [Negative] : ENT [de-identified] : LBP, R leg pain

## 2024-08-23 NOTE — DATA REVIEWED
[de-identified] : LUMBAR XRAY 4/1/24 Stable Hardware; generalized osteopenia and degenerative spondylosis

## 2024-08-23 NOTE — HISTORY OF PRESENT ILLNESS
[FreeTextEntry1] : 8/22/24: Mr. Archer is a 67 y/o, male, with a hx of lumbar degeneration s/p L4-L5 PLIF 04/23, here for f/u. He reports continued R lower back pain 8/10, radiating to right lower extremity. He is continuing physical therapy and seeing pain management. He had an injection last month which helped him for 3 days only. He had an MRI of the right hip per pain management showing no acute fracture or dislocation, findings c/w right sided trochanteric bursitis.   5/16/24: Mr. Archer is a 67 y/o, male, with a hx of lumbar degeneration s/p L4-L5 PLIF 04/23, here for f/u regarding R leg pain. He reports that he has had PT and chiropractic adjustment with moderate relief. He takes Tramadol as needed.  3/19/24: Mr. Archer is a 67 y/o, male, with a hx of lumbar degeneration s/p L4-L5 PLIF 04/23, here for f/u regarding R leg pain. He reports since last office visit he sustained a fall due to his leg pain and was hospitalized. CT lumbar spine with instrumentation intact. He rates his pain an 8/10.  No bowel or bladder dysfunction.   11/16/23: Mr. Archer is a 67 y/o, male, with a hx of lumbar degeneration s/p L4-L5 PLIF 04/23. Overall his L leg pain has improved however, x 4 months ago s/p PT he began to experience severe R leg pain radiating down to foot. He rates the pain an 8/10. Initially prior to surgery he had b/l leg pain, but worse on L. He has been taking tramadol for his pain. He is able to walk long distances despite of pain however, he is concerned as this was not present prior. Denies bowel/bladder dysfunction.   09/19/23: 67 y/o M with hx of L4-5 fusion. Patient presents c/o continued b/l LE pain after surgery. Patient denies back pain and states that overall his pain is much better after surgery and is alleviated with tramadol and methocarbamol. No bowel/bladder dysfunction.  04/27/23: Mr. Thomas is a 67 y/o, male, here for f/u post op L4-L5 fusion with posterior instrumentation 04/14/23. Post op pt experienced RLE DVT and has been taking eliquis 2.5 mg BID. He reports his RLE calve pain has much improved, denies any SOB, CP. Overall his back pain has improved. He continues to have numbness, tingling and sensitivity of thighs. He is having regular BM at home. He has been taking muscle relaxer and tramadol.  02/22/23: Mr. Archer is a 68 y/o, male with low back radiating to b/l LEs. His pain is worsened by lying or sitting long periods. The pain in his legs is his most bothersome symptom. His pain improves by walking. He has numbness of b/l thighs, and a burning sensation on his feet and toes. He has been taking tramadol for his symptoms. He denies urine/fecal incontinence.

## 2024-10-28 NOTE — H&P PST ADULT - RS GEN PE MLT RESP DETAILS PC
PRINCIPAL PROCEDURE  Procedure: Laparoscopic cholecystectomy  Findings and Treatment: PAIN:   Continue taking tylenol as needed. You may take 1000mg every 6 hours. If you are having extreme pain you may take Oxycodone that has been sent to your pharmacy. Oxycodone is to be taken only for severe pain as needed every 4 hours.   WOUND CARE: -Covering your incisions are white pieces of tape called steri-strips. You can shower with these and they will curl up and begin to fall off on their own in about 7 days.   BATHING: You may shower and/or sponge bathe.  ACTIVITY: No heavy lifting anything more than 10-15lbs or straining. Otherwise, you may return to your usual level of physical activity. If you are taking narcotic pain medication (such as oxycodone), do NOT drive a car, operate machinery or make important decisions.  NOTIFY YOUR SURGEON IF: You have any bleeding that does not stop, any fever (over 100.4 F) or chills, persistent nausea/vomiting with inability to tolerate food or liquids, persistent diarrhea, or if your pain is not controlled on your discharge pain medications.  FOLLOW-UP:  Please follow up with Dr. Dave in one week regarding your hospitalization.       clear to auscultation bilaterally/no wheezes/respirations non-labored/airway patent

## 2024-11-19 NOTE — PATIENT PROFILE ADULT - HEALTH LITERACY
PRE-SEDATION ASSESSMENT    CONSENT  Consent for procedure and sedation obtained: Yes    PHYSICAL EXAM  Airway Anatomy : Class III  Heart : Normal  Lungs : Normal  LOC/Mental Status : Normal    OTHER FINDINGS  Reviewed current medications and allergies: Yes    SEDATION RISK ASSESSMENT  Risk Status ASA: ASA II  A patient with mild systemic disease    Past anesthetic/sedation complications/reactions: No    If yes, explain: N/A    
no

## 2024-12-17 NOTE — PHYSICAL THERAPY INITIAL EVALUATION ADULT - PHYSICAL ASSIST/NONPHYSICAL ASSIST: STAND/SIT, REHAB EVAL
Outpatient Physical Therapy  DAILY TREATMENT     [address]  [address]  [place] [address]  Phone:  [phone]  Fax:  [phone]    Date: 12/17/2024    Patient: Rick Schafer  YOB: 2004  MRN: [id number]     Time Calculation    Start time: 1445  Stop time: 1530 Time Calculation (min): 45 minutes         Chief Complaint: Knee Problem    Visit #: 11    SUBJECTIVE:  No change in back pain, but overall feels ready to discharge next visit for knee pain due to management independently.    OBJECTIVE:       Therapeutic Exercises (CPT 97343):     1. rec bike, 5min warm up, no upright bike-caused ganglion cyst    2. Standing heel raises, to fatigue, NT    3. shuttle SL, emphasis on anterior tibial translation, to fatigue ea, 2 sets, 4c    4. Tandem balance on foam, 3x20s    5. Bridge holds, 2x fatigue, NT    6. Lat walk green TB around feet, 6x5 steps ea, NT    7. Monster walks mauveTB around calves, 6x5 steps ea, NT    8. Seated quad iso, to fatigue 5x ea, belt strapped to table    9. Standing hip abd and ext, 4in box, to fatigue B x2, NT    10. SLB, toe touch assist as needed, tossing plyo ball, 3x30s ea, foam, NT    11. Prone hamstring curl, 7.5#, to fatigue ea, NT    12. Step up with opp knee drive, 4in step 2x8 ea, NT    13. Clamshell piTB, 2x fatigue B, NT    14. SLR, 2xfatigue B, NT    20. PN 12/20    Time-based treatments/modalities:    Physical Therapy Timed Treatment Charges  Therapeutic exercise minutes (CPT 79894): 45 minutes      ASSESSMENT:   Response to treatment: plan to d/c next visit due to independent management of symptoms and overall decrease of pain with stairs and walking    PLAN/RECOMMENDATIONS:   Plan for treatment: therapy treatment to continue next visit.  Planned interventions for next visit: continue with current treatment.          verbal cues/nonverbal cues (demo/gestures)/1 person assist

## (undated) DEVICE — VENODYNE/SCD SLEEVE CALF MEDIUM

## (undated) DEVICE — DRAPE 1/2 SHEET 40X57"

## (undated) DEVICE — BAG DECANTER IV STERILE

## (undated) DEVICE — DRAIN PENROSE .5" X 18" LATEX

## (undated) DEVICE — DRSG XEROFORM 1 X 8"

## (undated) DEVICE — DRSG MASTISOL

## (undated) DEVICE — DRSG TELFA 3 X 8

## (undated) DEVICE — SOL IRR POUR H2O 500ML

## (undated) DEVICE — DRSG STERISTRIPS 0.5 X 4"

## (undated) DEVICE — CONN FEMALE LUER ADAPTOR SM XMAS TREE

## (undated) DEVICE — LABELS BLANK W PEN

## (undated) DEVICE — SUT BIOSYN 4-0 18" P-12

## (undated) DEVICE — STAPLER SKIN VISI-STAT 35 WIDE

## (undated) DEVICE — DRAPE LAPAROTOMY W POUCHES

## (undated) DEVICE — DRAPE MICROSCOPE LEICA 26" X 150"

## (undated) DEVICE — SUT MONOCRYL 4-0 18" P-3 UNDYED

## (undated) DEVICE — ELCTR MONOPOLAR STIMULATOR PROBE FLUSH-TIP

## (undated) DEVICE — MIDAS REX MR7 LUBRICANT DIFFUSER CARTRIDGE

## (undated) DEVICE — BASIN SET SINGLE

## (undated) DEVICE — DRSG GLASSOCK ADULT

## (undated) DEVICE — FOLEY CATH 2-WAY 22FR 5CC UNCOATED SILICONE

## (undated) DEVICE — NDL LITE Y 200MM

## (undated) DEVICE — DRAPE MICROSCOPE OPMI VISIONGUARD 48X118"

## (undated) DEVICE — NDL SPINAL 18G X 3.5" (PINK)

## (undated) DEVICE — ELCTR ROCKER SWITCH PENCIL BLUE 10FT

## (undated) DEVICE — BIPOLAR FORCEP KIRWAN JEWELERS STR 4" X 0.4MM W 12FT CORD (GREEN)

## (undated) DEVICE — ZIMMER BLADE DERMATONE

## (undated) DEVICE — BIPOLAR FORCEP STRYKER STANDARD 8" X 1MM (YELLOW)

## (undated) DEVICE — DRSG TAPE UMBILICAL COTTON 2" X 30 X 1/8"

## (undated) DEVICE — DRSG PELLET

## (undated) DEVICE — POSITIONER FOAM HEAD DONUT 9" (PINK)

## (undated) DEVICE — DRSG TEGADERM 6"X8"

## (undated) DEVICE — FOR-ESU VALLEYLAB T7E14830DX: Type: DURABLE MEDICAL EQUIPMENT

## (undated) DEVICE — GLV 7.5 PROTEXIS (BLUE)

## (undated) DEVICE — MIDAS REX LEGEND MATCH HEAD FLUTED LG BORE 3.0MM X 14CM

## (undated) DEVICE — POSITIONER PATIENT SAFETY STRAP 3X60"

## (undated) DEVICE — GLV 7 PROTEXIS (WHITE)

## (undated) DEVICE — SPONGE DISSECTOR PEANUT

## (undated) DEVICE — SUT POLYSORB 3-0 30" V-20 UNDYED

## (undated) DEVICE — BUR ANSPACH BALL DIAMOND COARSE 3MM

## (undated) DEVICE — MARKING PEN W RULER

## (undated) DEVICE — ELCTR PEDICLE SCREW PROBE 3MM BALL 1.8MM X 100MM

## (undated) DEVICE — ELCTR GROUNDING PAD ADULT COVIDIEN

## (undated) DEVICE — TONGUE DEPRESSOR

## (undated) DEVICE — ELCTR BIPOLAR CORD GYRUS 12FT DISP

## (undated) DEVICE — DRILL BIT ANSPACH DIAMOND BALL 2MMX5CM

## (undated) DEVICE — DRAPE LIGHT HANDLE COVER (BLUE)

## (undated) DEVICE — DRAPE C ARM UNIVERSAL

## (undated) DEVICE — ELCTR GROUNDING PAD INFANT COVIDIEN

## (undated) DEVICE — DRAPE 3/4 SHEET 52X76"

## (undated) DEVICE — CLIP IRRIGATIION FOR QD8/QD5S ANGLE ATTACHMENT

## (undated) DEVICE — DRAPE SURGICAL #1010

## (undated) DEVICE — STRYKER 4-PORT MANIFOLD W/SPECIMEN COLLECTION

## (undated) DEVICE — ELCTR BOVIE TIP BLADE INSULATED 4" EDGE

## (undated) DEVICE — DRAPE TOWEL BLUE 17" X 24"

## (undated) DEVICE — PACK MASTOID/MIDDLE EAR

## (undated) DEVICE — POSITIONER FOAM EGG CRATE ULNAR 2PCS (PINK)

## (undated) DEVICE — DRSG 2X2

## (undated) DEVICE — GOWN XL

## (undated) DEVICE — SUT POLYSORB 0 30" GU-46

## (undated) DEVICE — BUR ANSPACH BALL FLUTED EXT 3MM

## (undated) DEVICE — PREP BETADINE KIT

## (undated) DEVICE — PACK LUMBAR LAMI

## (undated) DEVICE — DRAPE MAGNETIC INSTRUMENT MEDIUM

## (undated) DEVICE — FOLEY TRAY 16FR 5CC LF UMETER CLOSED

## (undated) DEVICE — SUT PLAIN GUT FAST ABSORBING 5-0 PC-1

## (undated) DEVICE — ELCTR NDL SUBDERMAL 2 CHANNEL

## (undated) DEVICE — DRAPE SPLIT SHEET 77" X 120"